# Patient Record
Sex: FEMALE | Race: BLACK OR AFRICAN AMERICAN | NOT HISPANIC OR LATINO | Employment: OTHER | URBAN - METROPOLITAN AREA
[De-identification: names, ages, dates, MRNs, and addresses within clinical notes are randomized per-mention and may not be internally consistent; named-entity substitution may affect disease eponyms.]

---

## 2017-10-12 PROBLEM — R76.8 HEPATITIS C ANTIBODY TEST POSITIVE: Status: ACTIVE | Noted: 2017-10-12

## 2017-11-13 PROBLEM — K62.5 RECTAL BLEEDING: Status: ACTIVE | Noted: 2017-11-13

## 2017-11-13 PROBLEM — A04.8 H. PYLORI INFECTION: Status: ACTIVE | Noted: 2017-11-13

## 2018-08-03 ENCOUNTER — HOSPITAL ENCOUNTER (OUTPATIENT)
Dept: RADIOLOGY | Facility: HOSPITAL | Age: 63
Discharge: HOME | End: 2018-08-03
Attending: PSYCHIATRY & NEUROLOGY
Payer: COMMERCIAL

## 2018-08-03 ENCOUNTER — OFFICE VISIT (OUTPATIENT)
Dept: NEUROLOGY | Facility: CLINIC | Age: 63
End: 2018-08-03
Attending: INTERNAL MEDICINE
Payer: COMMERCIAL

## 2018-08-03 VITALS
SYSTOLIC BLOOD PRESSURE: 158 MMHG | RESPIRATION RATE: 16 BRPM | HEIGHT: 63 IN | BODY MASS INDEX: 45 KG/M2 | WEIGHT: 254 LBS | HEART RATE: 86 BPM | DIASTOLIC BLOOD PRESSURE: 90 MMHG

## 2018-08-03 DIAGNOSIS — M43.16 SPONDYLOLISTHESIS OF LUMBAR REGION: ICD-10-CM

## 2018-08-03 DIAGNOSIS — G62.9 POLYNEUROPATHY: ICD-10-CM

## 2018-08-03 DIAGNOSIS — M54.16 LUMBAR RADICULAR PAIN: Primary | ICD-10-CM

## 2018-08-03 DIAGNOSIS — M25.571 ACUTE RIGHT ANKLE PAIN: ICD-10-CM

## 2018-08-03 DIAGNOSIS — M51.9 LUMBAR DISC DISEASE: ICD-10-CM

## 2018-08-03 PROCEDURE — 3008F BODY MASS INDEX DOCD: CPT | Mod: CPTII,S$GLB,, | Performed by: PSYCHIATRY & NEUROLOGY

## 2018-08-03 PROCEDURE — 73610 X-RAY EXAM OF ANKLE: CPT | Mod: TC,RT

## 2018-08-03 PROCEDURE — 73610 X-RAY EXAM OF ANKLE: CPT | Mod: 26,RT,, | Performed by: RADIOLOGY

## 2018-08-03 PROCEDURE — 99999 PR PBB SHADOW E&M-EST. PATIENT-LVL III: CPT | Mod: PBBFAC,,, | Performed by: PSYCHIATRY & NEUROLOGY

## 2018-08-03 PROCEDURE — 3080F DIAST BP >= 90 MM HG: CPT | Mod: CPTII,S$GLB,, | Performed by: PSYCHIATRY & NEUROLOGY

## 2018-08-03 PROCEDURE — 99204 OFFICE O/P NEW MOD 45 MIN: CPT | Mod: S$GLB,,, | Performed by: PSYCHIATRY & NEUROLOGY

## 2018-08-03 PROCEDURE — 3077F SYST BP >= 140 MM HG: CPT | Mod: CPTII,S$GLB,, | Performed by: PSYCHIATRY & NEUROLOGY

## 2018-08-03 NOTE — PROGRESS NOTES
Consult from Dr Diaz    HPI: Rosa Fermin is a 63 y.o. female with history of pain   She used to see Dr Uribe, and outside neurologist who prescribed her tramadol for back pain.  He also reported she had OA of the knees and gait abnormality    The patient states she started seeing Dr Uribe years ago for back pain for which she started having TODD. The patient was maintained on medication long term.   However, in the last few months, her pain has increased tremendously. She states most of the pain is in the right ankle and the top of the right foot. She describes this pain as stabbing and shooting and sore. She does have lower back pain which is pulling on the right side especially which radiates down the leg via lateral leg to the ankle.   Her PCP did place her on Gabapentin which gave her some relief, but she still has moderate- severe pain. PCP also prescribes her narco and tramadol via pain contract. She also uses mobic without full relief.  She feels she could tolerate PT at this time.   She does have some numbness in the foot, which is episodic.  She is not diabetic  She was sent for EMG/NCS at [facility]  An MRI back was ordered by denied by insurance  She denies any trauma. 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   HENT: Negative for nosebleeds.    Eyes: Negative for double vision.   Respiratory: Negative for hemoptysis.    Cardiovascular: Negative for leg swelling.   Gastrointestinal: Negative for blood in stool.   Genitourinary: Negative for hematuria.   Musculoskeletal: Positive for back pain.   Skin: Negative for rash.   Neurological: Positive for sensory change.   Psychiatric/Behavioral: The patient has insomnia.         Poor sleep due to pain.          I have reviewed all of this patient's past medical and surgical histories as well as  family and social histories and active allergies and medications as documented in the electronic medical record.        Exam:  Gen Appearance, well developed/nourished in no apparent distress  CV: 2+ distal pulses with no edema except trace in the legs (she states chronically)  Neuro:  MS: Awake, alert, oriented to place, person, time, situation. Sustains attention. Recent/remote memory intact, Language is full to spontaneous speech/repetition/naming/comprehension. Fund of Knowledge is full  CN: Optic discs are flat with normal vasculature, PERRL, Extraoccular movements and visual fields are full. Normal facial sensation and strength, Hearing symmetric, Tongue and Palate are midline and strong. Shoulder Shrug symmetric and strong.  Motor: Normal bulk, tone, no abnormal movements. 5/5 strength bilateral upper/lower extremities with 1+ reflexes and no clonus  Sensory: symmetric to light touch, pain, temp, and vibration- but decreased to pin in the legs Romberg negative  Cerebellar: Finger-nose,Heal-shin, Rapid alternating movements intact  Gait: Normal stance, no ataxia but she uses cane for walking chronically due to knee arthritis (feet are chronically everted    Imagin2018 EMG: Sensory and motor axonal polyneuropathy  Labs: 2018 CMP, CBC, A1C, B12 reviewed  HIV negative    Xray L spine 2018: Grade 2-3 spondylolisthesis L5 on S1 with suspected spondylolysis.  Superimposed severe degenerative change L5-S1 disc.    Glucose may have been 120 at one lab?      Assessment/Plan: Rosa Fermin is a 63 y.o. female with years of lower back pain and radicular leg pain now with severe right ankle pain and worse right lateral leg radicular pain (L5 pattern). 2018 EMG shows sensory and motor axonal polyneuropathy and Xray of the L spine showed Grade 2-3 spondylolisthesis L5 on S1 with suspected spondylolysis.  Superimposed severe degenerative change L5-S1 disc  I recommend:   1. MRI L spine is medically necessary to  further access lumbar spinal stability given her severe listhesis  and severe disc changes. This is necessary prior to trying any PT or other procedures  2. Will check xray of the right ankle as it is peculiar that EMG did not demonstrate radiculopathy given her symptoms and Lumbar  xray findings.   3. SPEP/NOEL. Otherwise, she may have had a mild impairment if fasting blood glucose as a cause of DM prior. A1C is normal. Discussed diet as measure to avoid DM2/worsening neuropathy  4. She is getting some relief with gabapentin plus narcotics managed by PCP otherwise   RTC in 6 weeks  Patient was asked to call for MRI results and further instructions    Dr Diaz

## 2018-08-17 ENCOUNTER — HOSPITAL ENCOUNTER (OUTPATIENT)
Dept: RADIOLOGY | Facility: HOSPITAL | Age: 63
Discharge: HOME | End: 2018-08-17
Attending: PSYCHIATRY & NEUROLOGY
Payer: COMMERCIAL

## 2018-08-17 DIAGNOSIS — M54.50 ACUTE LOW BACK PAIN DUE TO SPINAL DISORDER: Primary | ICD-10-CM

## 2018-08-17 DIAGNOSIS — M53.9 ACUTE LOW BACK PAIN DUE TO SPINAL DISORDER: Primary | ICD-10-CM

## 2018-08-17 PROCEDURE — 72148 MRI LUMBAR SPINE W/O DYE: CPT | Mod: 26,,, | Performed by: RADIOLOGY

## 2018-08-17 PROCEDURE — 72148 MRI LUMBAR SPINE W/O DYE: CPT | Mod: TC

## 2018-08-22 ENCOUNTER — TELEPHONE (OUTPATIENT)
Dept: NEUROSURGERY | Facility: CLINIC | Age: 63
End: 2018-08-22

## 2018-08-22 ENCOUNTER — OFFICE VISIT (OUTPATIENT)
Dept: NEUROSURGERY | Facility: CLINIC | Age: 63
End: 2018-08-22
Payer: COMMERCIAL

## 2018-08-22 ENCOUNTER — HOSPITAL ENCOUNTER (OUTPATIENT)
Dept: RADIOLOGY | Facility: HOSPITAL | Age: 63
Discharge: HOME | End: 2018-08-22
Attending: NEUROLOGICAL SURGERY
Payer: COMMERCIAL

## 2018-08-22 VITALS
BODY MASS INDEX: 43.91 KG/M2 | HEART RATE: 118 BPM | WEIGHT: 247.88 LBS | SYSTOLIC BLOOD PRESSURE: 170 MMHG | DIASTOLIC BLOOD PRESSURE: 76 MMHG | TEMPERATURE: 99 F

## 2018-08-22 DIAGNOSIS — M43.17 SPONDYLOLISTHESIS OF LUMBOSACRAL REGION: ICD-10-CM

## 2018-08-22 DIAGNOSIS — R29.898 LEFT LEG WEAKNESS: Primary | ICD-10-CM

## 2018-08-22 DIAGNOSIS — M43.17 SPONDYLOLISTHESIS, LUMBOSACRAL REGION: ICD-10-CM

## 2018-08-22 DIAGNOSIS — M43.16 SPONDYLOLISTHESIS OF LUMBAR REGION: ICD-10-CM

## 2018-08-22 DIAGNOSIS — M43.17 SPONDYLOLISTHESIS OF LUMBOSACRAL REGION: Primary | ICD-10-CM

## 2018-08-22 DIAGNOSIS — Z98.1 HISTORY OF LUMBAR FUSION: Primary | ICD-10-CM

## 2018-08-22 DIAGNOSIS — R29.898 LEFT LEG WEAKNESS: ICD-10-CM

## 2018-08-22 PROCEDURE — 3078F DIAST BP <80 MM HG: CPT | Mod: CPTII,S$GLB,, | Performed by: NEUROLOGICAL SURGERY

## 2018-08-22 PROCEDURE — 3077F SYST BP >= 140 MM HG: CPT | Mod: CPTII,S$GLB,, | Performed by: NEUROLOGICAL SURGERY

## 2018-08-22 PROCEDURE — 72120 X-RAY BEND ONLY L-S SPINE: CPT | Mod: 26,,, | Performed by: RADIOLOGY

## 2018-08-22 PROCEDURE — 99204 OFFICE O/P NEW MOD 45 MIN: CPT | Mod: S$GLB,,, | Performed by: NEUROLOGICAL SURGERY

## 2018-08-22 PROCEDURE — 99999 PR PBB SHADOW E&M-EST. PATIENT-LVL IV: CPT | Mod: PBBFAC,,, | Performed by: NEUROLOGICAL SURGERY

## 2018-08-22 PROCEDURE — 72120 X-RAY BEND ONLY L-S SPINE: CPT | Mod: TC

## 2018-08-22 PROCEDURE — 3008F BODY MASS INDEX DOCD: CPT | Mod: CPTII,S$GLB,, | Performed by: NEUROLOGICAL SURGERY

## 2018-08-22 NOTE — H&P (VIEW-ONLY)
Subjective:   I, Juan Cheek, attest that this documentation has been prepared under the direction and in the presence of Noel Bernal MD.     Patient ID: Rosa Fermin is a 63 y.o. female     Chief Complaint: Consult      HPI  The patient is a 63 y.o. female who was referred to me by Dr. Tapia. The pt complains of acute on chronic low back pain.  She has a long hx of back pain and was followed by an outside facility for her back pain for many years which was well-maintained with medication and TODD's. However, her back pain has been progressing since December but has significantly worsened within the last two months. She notes pain in her bilateral lower pain with intermittent radiation into the RLE. She states the pain has limited her physical activities and is exacerbated when sitting or laying supine. She has not been able to sleep on her bed because the pain is intolerable and is now sleeping on her couch. She rates the pain a 8-9/10 without pain medication and 3/10 with medication. She endorses BLE weakness which is worse on the left, secondary to back pain and chronic knee pain. She has a hx of left knee surgery. She works as a  in the setting of outpatient clinics and hospitals. The pt reports no hx of cigarette use. Pt has a hx of HTN but no cardiac hx.    Review of Systems   Constitutional: Negative for activity change, fatigue, fever and unexpected weight change.   HENT: Negative for facial swelling.    Eyes: Negative.    Respiratory: Negative.    Cardiovascular: Negative.    Gastrointestinal: Negative for diarrhea, nausea and vomiting.   Genitourinary: Negative.    Musculoskeletal: Positive for back pain (lumbar). Negative for joint swelling, myalgias and neck pain.        Positive for RLE pain.   Neurological: Positive for weakness (BLE; left greater than right). Negative for dizziness, numbness and headaches.   Psychiatric/Behavioral: Negative.       Past Medical History:    Diagnosis Date    Atypical chest pain     Bilateral chronic knee pain     Carpal tunnel syndrome on both sides     Chronic lower back pain     History of DVT (deep vein thrombosis)     left leg in the 90s    Hypertension     Osteoarthritis of both knees        Objective:      Vitals:    08/22/18 1104   BP: (!) 170/76   Pulse: (!) 118   Temp: 98.7 °F (37.1 °C)      Physical Exam   Constitutional: She is oriented to person, place, and time. She appears well-developed and well-nourished.   HENT:   Head: Normocephalic and atraumatic.   Neck: Neck supple.   Neurological: She is alert and oriented to person, place, and time. No cranial nerve deficit or sensory deficit. She displays a negative Romberg sign. GCS eye subscore is 4. GCS verbal subscore is 5. GCS motor subscore is 6.   Pt exhibits decreased strength in her bilateral lower extremities, left greater than right.          IMAGING:  MRI Lumbar spine WO Contrast (08/17/2018) shows spondylolisthesis with associated irritation of the nerve root and lumbar canal stenosis at L5/S1. There is a broad-based disc herniation at the level above this at L4/L5.    I have personally reviewed the images with the pt.      I, Dr. Noel Bernal, personally performed the services described in this documentation. All medical record entries made by the scribe, Juan Cheek, were at my direction and in my presence.  I have reviewed the chart and agree that the record reflects my personal performance and is accurate and complete. Noel Bernal MD. 08/22/2018    Assessment:       1. Left leg weakness    2. Spondylolisthesis, lumbosacral region         Plan:   I have personally reviewed the MRI of lumbar spine with the pt which shows spondylolisthesis with associated irritation of the nerve root and lumbar canal stenosis at L5/S1. There is a broad-based disc herniation at the level above this at L4/L5.    I recommend the patient a two-level Transforaminal lumbar interbody fusion  (TLIF) at L4/5 and L5/S1. I have discussed the risks/benefits, indications, and alternatives for the proposed procedure in detail.  I have answered all of her questions and the pt wishes to proceed with surgery.  We will schedule the pt on Thursday, September 13, 2018    I will schedule the pt for an X-ray of the lumbar spine with flexion and extension today.     She will also require a CT of lumbar spine prior to the procedure.    I recommend the patient to follow up with Pre-op for further medical evaluation. I will refer the patient.     I have informed her that she would need to be out of work for approximately 6 weeks.

## 2018-08-22 NOTE — PATIENT INSTRUCTIONS
I have personally reviewed the MRI of lumbar spine with the pt shows spondylolisthesis with associated irritation of the nerve root and lumbar canal stenosis at L5/S1. There is a broad-based disc herniation at the level above this at L4/L5.    I recommend the patient a two-level Transforaminal lumbar interbody fusion (TLIF) at L4/5 and L5/S1. I have discussed the risks/benefits, indications, and alternatives for the proposed procedure in detail.  I have answered all of her questions and the pt wishes to proceed with surgery.  We will schedule the pt on Thursday, September 13, 2018    I will schedule the pt for an X-ray of the lumbar spine with flexion and extension today.     She will also require a CT of lumbar spine prior to the procedure.    I recommend the patient to follow up with Pre-op for further medical evaluation. I will refer the patient.     I have informed her that she would need to be out of work for approximately 6 weeks.

## 2018-08-24 ENCOUNTER — ANESTHESIA EVENT (OUTPATIENT)
Dept: SURGERY | Facility: HOSPITAL | Age: 63
End: 2018-08-24
Payer: COMMERCIAL

## 2018-08-24 ENCOUNTER — TELEPHONE (OUTPATIENT)
Dept: PREADMISSION TESTING | Facility: HOSPITAL | Age: 63
End: 2018-08-24

## 2018-08-24 DIAGNOSIS — Z01.818 PREOPERATIVE TESTING: Primary | ICD-10-CM

## 2018-08-24 NOTE — ANESTHESIA PREPROCEDURE EVALUATION
Anesthesia Assessment: Preoperative EQUATION     Planned Procedure: Procedure(s) (LRB):  FUSION, SPINE, LUMBAR, TLIF, MINIMALLY INVASIVE @ L4/L5 & L5/S1 (N/A)  Requested Anesthesia Type:General  Surgeon: Noel Bernal MD  Service: Neurosurgery  Known or anticipated Date of Surgery:9/13/2018     Surgeon notes: reviewed     Electronic QUestionnaire Assessment completed via nurse interview with patient.      NO AQ     Triage considerations:         Previous anesthesia records:MAC and No problems  12/11/2017 COLONOSCOPY  Airway/Jaw/Neck:  Airway Findings: Mouth Opening: Normal Tongue: Normal  General Airway Assessment: Adult  Mallampati: II  TM Distance: Normal, at least 6 cm  Jaw/Neck Findings:  Neck ROM: Normal ROM            Last PCP note: within 3 months , within [facility]   Subspecialty notes: Gastroenterology, Neurology, NEUROSURGERY     Other important co-morbidities: PER Epic: HTN, MORBID OBESE, H/O DVT     Tests already available:  Available tests,  within 3 months , within [facility] .7/20/2018 CMP,CBC,7/19/2018 UA, MICRO UA,7/13/2018 HGA1C, 4/11/2018 TSH                            Instructions given. (See in Nurse's note)     Optimization:  Anesthesia Preop Clinic Assessment  Indicated    Medical Opinion Indicated                                        Plan:    Testing:  PT/INR, PTT and EKG   Pre-anesthesia  visit                                        Visit focus: concerns in complex and/or prolonged anesthesia                           Consultation:IM Perioperative Hospitalist                           Patient  has previously scheduled Medical Appointment:9/4 CT NON CONTRAST     Navigation: Tests Scheduled.TBD                         Consults scheduled.TBD                        Results will be tracked by Preop Clinic.                                     Electronically signed by Meghana Omalley RN at 8/24/2018  9:59 AM       Pre-admit on 9/13/2018            Detailed Report    9/10 Labs and EKG resulted and  noted.  Meghana Omalley RN BSN  Preop Center                                                                                                                     08/24/2018  Rosa Fermin is a 63 y.o., female.    Anesthesia Evaluation      I have reviewed the Medications.   Steroids Taken In Past Year:     Review of Systems  Anesthesia Hx:  No problems with previous Anesthesia History of prior surgery of interest to airway management or planning: Previous anesthesia: General, Nerve Block colonoscopy 12/2017 with general anesthesia.  Procedure performed at an [facility] Facility. for L TKA 2015.  Procedure performed at an [facility] Facility. Denies Family Hx of Anesthesia complications.    Social:  Non-Smoker, No Alcohol Use    Hematology/Oncology:  Hematology Normal   Oncology Normal     EENT/Dental:   Reading glasses   Cardiovascular:   Hypertension H/o DVT 1990's Functional Capacity good / => 4 METS, currently walking with walker; works as SW; climb 1 FOS; reports SOB on exertion which is not new; denies CP    Pulmonary:   Denies Asthma.  Denies Shortness of breath.  Denies Sleep Apnea.    Hepatic/GI:   GERD (takes Zantac), well controlled    Musculoskeletal:   Arthritis (OA s/p L TKA 2015)  Spondylolisthesis; bulging disc    States had steroid injection in hip approx 2.5 months ago Spine Disorders: lumbar Chronic Pain    Neurological:   Denies CVA. Neuromuscular Disease,  Denies Seizures. BLE weakness Pain , onset is chronic , location of back , quality of aching/dull, throbbing, sharp , severity is a 7    Endocrine:   Denies Diabetes.        Physical Exam  General:  Obesity    Airway/Jaw/Neck:  Airway Findings: Mouth Opening: Normal Tongue: Normal  General Airway Assessment: Adult  Jaw/Neck Findings:  Neck ROM: Extension Decreased, Mild  Neck Findings:  Short Neck      Dental:  Dental Findings: In tact         Mental Status:  Mental Status Findings:  Cooperative, Alert and Oriented       Pt was seen in POC  9/5/18; Medical optimization: please see EPIC notes for recommendations of pre-op medical consultant, Dr Thorpe, for perioperative medical management./Josefina Barajas RN          Anesthesia Plan  Type of Anesthesia, risks & benefits discussed:  Anesthesia Type:  general  Patient's Preference: Proceed with anesthesia understanding that the risks are very small but could be serious or life threatening.  Intra-op Monitoring Plan: standard ASA monitors  Intra-op Monitoring Plan Comments:   Post Op Pain Control Plan:   Post Op Pain Control Plan Comments:   Induction:   IV  Beta Blocker:  Patient is not currently on a Beta-Blocker (No further documentation required).       Informed Consent: Patient understands risks and agrees with Anesthesia plan.  Questions answered. Anesthesia consent signed with patient.  ASA Score: 3     Day of Surgery Review of History & Physical: I have interviewed and examined the patient. I have reviewed the patient's H&P dated:    H&P update referred to the surgeon.         Ready For Surgery From Anesthesia Perspective.

## 2018-08-24 NOTE — PRE-PROCEDURE INSTRUCTIONS
Preop instructions given. Hold asa, asa containing products, nsaids, vitamins and supplements one week prior to surgery. Will need optimization by Dr. Thorpe, poc appt, labs and ekg prior to surgery.  Our  will call to set up these appts.Verbalizes understanding.

## 2018-09-05 ENCOUNTER — INITIAL CONSULT (OUTPATIENT)
Dept: INTERNAL MEDICINE | Facility: CLINIC | Age: 63
End: 2018-09-05
Payer: COMMERCIAL

## 2018-09-05 ENCOUNTER — HOSPITAL ENCOUNTER (OUTPATIENT)
Dept: CARDIOLOGY | Facility: CLINIC | Age: 63
Discharge: HOME | End: 2018-09-05
Payer: COMMERCIAL

## 2018-09-05 ENCOUNTER — HOSPITAL ENCOUNTER (OUTPATIENT)
Dept: PREADMISSION TESTING | Facility: HOSPITAL | Age: 63
Discharge: HOME | End: 2018-09-05
Attending: ANESTHESIOLOGY
Payer: COMMERCIAL

## 2018-09-05 VITALS
HEIGHT: 63 IN | SYSTOLIC BLOOD PRESSURE: 130 MMHG | OXYGEN SATURATION: 100 % | TEMPERATURE: 98 F | HEART RATE: 95 BPM | DIASTOLIC BLOOD PRESSURE: 74 MMHG | WEIGHT: 242.69 LBS | BODY MASS INDEX: 43 KG/M2

## 2018-09-05 DIAGNOSIS — K21.9 GASTROESOPHAGEAL REFLUX DISEASE, ESOPHAGITIS PRESENCE NOT SPECIFIED: ICD-10-CM

## 2018-09-05 DIAGNOSIS — Z01.818 PREOPERATIVE TESTING: ICD-10-CM

## 2018-09-05 DIAGNOSIS — R76.8 HEPATITIS C ANTIBODY TEST POSITIVE: ICD-10-CM

## 2018-09-05 DIAGNOSIS — F11.90 CHRONIC, CONTINUOUS USE OF OPIOIDS: Chronic | ICD-10-CM

## 2018-09-05 DIAGNOSIS — R60.9 EDEMA, UNSPECIFIED TYPE: ICD-10-CM

## 2018-09-05 DIAGNOSIS — I10 ESSENTIAL HYPERTENSION: ICD-10-CM

## 2018-09-05 DIAGNOSIS — Z01.818 PREOP EXAMINATION: Primary | ICD-10-CM

## 2018-09-05 DIAGNOSIS — Z98.890 HISTORY OF CARDIAC CATH: ICD-10-CM

## 2018-09-05 DIAGNOSIS — Z86.718 HISTORY OF DVT (DEEP VEIN THROMBOSIS): ICD-10-CM

## 2018-09-05 PROBLEM — K62.5 RECTAL BLEEDING: Status: RESOLVED | Noted: 2017-11-13 | Resolved: 2018-09-05

## 2018-09-05 PROCEDURE — 99244 OFF/OP CNSLTJ NEW/EST MOD 40: CPT | Mod: S$GLB,,, | Performed by: HOSPITALIST

## 2018-09-05 PROCEDURE — 99999 PR PBB SHADOW E&M-EST. PATIENT-LVL IV: CPT | Mod: PBBFAC,,, | Performed by: HOSPITALIST

## 2018-09-05 PROCEDURE — 93000 ELECTROCARDIOGRAM COMPLETE: CPT | Mod: S$GLB,,, | Performed by: INTERNAL MEDICINE

## 2018-09-05 RX ORDER — DEXTROMETHORPHAN HYDROBROMIDE, GUAIFENESIN 5; 100 MG/5ML; MG/5ML
650 LIQUID ORAL
Status: ON HOLD | COMMUNITY
End: 2018-09-20 | Stop reason: HOSPADM

## 2018-09-05 NOTE — ASSESSMENT & PLAN NOTE
Edema- I suggested avoidance of added salt,avoidance of NSAID's and suggested Limb elevation and alena hose use

## 2018-09-05 NOTE — PATIENT INSTRUCTIONS
PreOp Instructions given:    -- Medication information (what to hold and what to take)   -- NPO guidelines as follows: (or as per your Surgeon)  1. Stop ALL solid food, gum, candy (including vitamins) 8 hours before surgery/procedure time.  2. Stop all CLOUDY liquids: coffee with creamer, cloudy juices, 6 hours prior to surgery/procedure time.  3. The patient should be ENCOURAGED to drink carbohydrate-rich clear liquids (sports drinks, clear juices) until 2 hours prior to surgery/procedure time.  4. CLEAR liquids include only water, black coffee NO creamer, clear oral rehydration drinks, clear sports drinks or clear fruit juices (no orange juice, no pulpy juices, no apple cider).   5. IF IN DOUBT, drink water instead.   6. NOTHING TO DRINK 2 hours before to surgery/procedure time. If you are told to take medication on the morning of surgery, it may be taken with a sip of water.   -- Arrival place and directions given; time to be given the day before procedure by the Surgeon's Office   -- Bathing with antibacterial soap   -- Don't wear any jewelry or bring any valuables AM of surgery   -- No makeup or moisturizer to face   -- No perfume/cologne, powder, lotions or aftershave     Pt verbalized understanding.

## 2018-09-05 NOTE — ASSESSMENT & PLAN NOTE
GERD-  I suggest continuation of the Zantac  in the perioperative period . I suggest aspiration precautions

## 2018-09-05 NOTE — LETTER
September 7, 2018      Noel Bernal MD  [address]  [place] [address]           [facility] - Pre Op Consult  [address] [facility] [address]  Phone: [phone]          Patient: Rosa Fermin   MR Number: [id number]   YOB: 1955   Date of Visit: 9/5/2018       Dear Dr. Noel Bernal:    Thank you for referring Rosa Fermin to me for evaluation. Attached you will find relevant portions of my assessment and plan of care.    If you have questions, please do not hesitate to call me. I look forward to following Rosa Fermin along with you.    Sincerely,    Emmy Thorpe MD    Enclosure  CC:  No Recipients    If you would like to receive this communication electronically, please contact [email or web address] or [phone] to request more information on [employer] Link access.    For providers and/or their staff who would like to refer a patient to [facility], please contact us through our one-stop-shop provider referral line, [facility], at [phone].    If you feel you have received this communication in error or would no longer like to receive these types of communications, please e-mail [email or web address]

## 2018-09-05 NOTE — ASSESSMENT & PLAN NOTE
In 1997   History of DVT- Left leg  No PE  1 Episode  In the setting of left ankle sprain from a mechanical fall   Associated with reduced mobility,no  long journeys,no  cancer,no prior surgery, no Hospital stay,No  HRT  Anticoagulated with Coumadin for 12- 18 months  IVC filter - none    Increased risk of thrombosis in the randi operative period , compression stocking use discussed

## 2018-09-05 NOTE — DISCHARGE INSTRUCTIONS
Your surgery has been scheduled for:__________________________________________    You should report to:  ____[facility], located on the [place] side of the first floor of the           [facility] ([phone])  ____[facility], located on the [facility] side of the            Second floor of the [facility] [phone])  ____3rd Floor [facility] located on the [facility] side of the [facility] [phone]  Please Note   - Tell your doctor if you take Aspirin, products containing Aspirin, herbal medications  or blood thinners, such as Coumadin, Ticlid, or Plavix.  (Consult your provider regarding holding or stopping before surgery).  - Arrange for someone to drive you home following surgery.  You will not be allowed to leave the surgical facility alone or drive yourself home following sedation and anesthesia.  Before Surgery  - Stop taking all herbal medications 14days prior to surgery  - No Motrin/Advil (Ibuprofen) 7 days before surgery  - No Aleve (Naproxen) 7 days before surgery  - Stop Taking Asprin, products containing Asprin _____days before surgery  - Stop taking blood thinners_______days before surgery  - No Goody's/BC  Powder 7 days before surgery  - Refrain from drinking alcoholic beverages for 24hours before and after surgery  - Stop or limit smoking _________days before surgery  - You may take Tylenol for pain  Night before Surgery  NOTHING TO EAT OR DRINK AFTER MIDNIGHT OR FOLLOW SURGEON'S INSTRUCTIONS  - Take a shower or bath (shower is recommended).  Bathe with Hibiclens soap or an antibacterial soap from the neck down.  If not supplied by your surgeon, hibiclens soap will need to be purchased over the counter in pharmacy.  Rinse soap off thoroughly.  - Shampoo your hair with your regular shampoo  The Day of Surgery  · If you are told to take medication on the morning of surgery, it may be taken with a  sip of water.   - Take another bath or shower with hibiclens or any antibacterial soap, to reduce the chance of infection.  - Take heart and blood pressure medications with a small sip of water, as advised by the perioperative team.  - Do not take fluid pills  - You may brush your teeth and rinse your mouth, but do not swall any additional water.   - Do not apply perfumes, powder, body lotions or deodorant on the day of surgery.  - Nail polish should be removed.  - Do not wear makeup or moisturizer  - Wear comfortable clothes, such as a button front shirt and loose fitting pants.  - Leave all jewelry, including body piercings, and valuables at home.    - Bring any devices you will neeed after surgery such as crutches or canes.  - If you have sleep apnea, please bring your CPAP machine  In the event that your physical condition changes including the onset of a cold or respiratory illness, or if you have to delay or cancel your surgery, please notify your surgeon.  Anesthesia: General Anesthesia  Youre due to have surgery. During surgery, youll be given medication called anesthesia. (It is also called anesthetic.) This will keep you comfortable and pain-free. Your anesthesia provider will use general anesthesia. This sheet tells you more about it.  What is general anesthesia?     You are watched continuously during your procedure by the anesthesia provider   General anesthesia puts you into a state like deep sleep. It goes into the bloodstream (IV anesthetics), into the lungs (gas anesthetics), or both. You feel nothing during the procedure. You will not remember it. During the procedure, the anesthesia provider monitors you continuously. He or she checks your heart rate and rhythm, blood pressure, breathing, and blood oxygen.  · IV Anesthetics. IV anesthetics are given through an IV line in your arm. Theyre often given first. This is so you are asleep before a gas anesthetic is started. Some kinds of IV  anesthetics relieve pain. Others relax you. Your doctor will decide which kind is best in your case.  · Gas Anesthetics. Gas anesthetics are breathed into the lungs. They are often used to keep you asleep. They can be given through a facemask or a tube placed in your larynx or trachea (breathing tube).  ? If you have a facemask, your anesthesia provider will most likely place it over your nose and mouth while youre still awake. Youll breathe oxygen through the mask as your IV anesthetic is started. Gas anesthetic may be added through the mask.  ? If you have a tube in the larynx or trachea, it will be inserted into your throat after youre asleep.  Anesthesia tools and medications  You will likely have:  · IV anesthetics. These are put into an IV line into your bloodstream.  · Gas anesthetics. You breathe these anesthetics into your lungs, where they pass into your bloodstream.  · Pulse oximeter. This is a small clip that is attached to the end of your finger. This measures your blood oxygen level.  · Electrocardiography leads (electrodes). These are small sticky pads that are placed on your chest. They record your heart rate and rhythm.  · Blood pressure cuff. This reads your blood pressure.  Risks and possible complications  General anesthesia has some risks. These include:  · Breathing problems  · Nausea and vomiting  · Sore throat or hoarseness (usually temporary)  · Allergic reaction to the anesthetic  · Irregular heartbeat (rare)  · Cardiac arrest (rare)   Anesthesia safety  · Follow all instructions you are given for how long not to eat or drink before your procedure.  · Be sure your doctor knows what medications and drugs you take. This includes over-the-counter medications, herbs, supplements, alcohol or other drugs. You will be asked when those were last taken.  · Have an adult family member or friend drive you home after the procedure.  · For the first 24 hours after your surgery:  ? Do not drive or use  heavy equipment.  ? Have a trusted family member or spouse make important decisions or sign documents.  ? Avoid alcohol.  ? Have a responsible adult stay with you. He or she can watch for problems and help keep you safe.  Date Last Reviewed: 10/16/2014  © 9908-7794 [employer]. [address] [place] [address]. All rights reserved. This information is not intended as a substitute for professional medical care. Always follow your healthcare professional's instructions.

## 2018-09-05 NOTE — ASSESSMENT & PLAN NOTE
Losartan ( talking Nightly )  usual BP  At work 140/80's  Works as a  in a doctor's office   Hypertension-  Blood pressure is acceptable . I suggest continuation of Losartan ( talking Nightly )  during the entire perioperative period. I suggest  blood pressure, electrolyte and renal function monitoring .I suggest addressing pain control as uncontrolled pain can increased blood pressure

## 2018-09-05 NOTE — OUTPATIENT SUBJECTIVE & OBJECTIVE
Outpatient Subjective & Objective     Chief complaint-Preoperative evaluation, Perioperative Medical management, complication reduction plan     Active cardiac conditions- none    Revised cardiac risk index predictors- none    Functional capacity -Examples of physical activity, works as ,  can take a flight of stairs holding on to the railing----- She can undertake all the above activities without  chest pain,chest tightness, ,dizziness,lightheadedness making her exercise tolerance more, less  than 4 Mets.   Winded on exertion, not new ,stable over time -attributes to weight and deconditioning     Review of Systems   Constitutional: Negative for chills and fever.            Weight loss 10  pounds over 4-6 weeks- suggested follow        HENT:        STOPBANG score  4/ 8      HTN  BMI> 35   Age over 50   Neck size over 40 CM     Eyes:        No new visual changes   Respiratory:        No cough , phlegm    No Hemoptysis   Cardiovascular:        As noted   Gastrointestinal:        No overt GI/ blood losses  Bowel movements- Regular    Endocrine:        Prednisone use > 20 mg daily for 3 weeks- None   Genitourinary: Negative for dysuria.        No urinary hesitancy    Musculoskeletal:        As above   Rt knee pain   Neurological: Negative for syncope.        No unilateral weakness   Hematological:        Current use of Anticoagulants  Current use of Antiplatelet agents  None   Psychiatric/Behavioral:        No Depression,Anxiety         No vascular stenting     Past Surgical History:   Procedure Laterality Date    COLONOSCOPY  2007    Dr Bolton    ENDOMETRIAL ABLATION N/A 2003    SHOULDER ARTHROSCOPY W/ ROTATOR CUFF REPAIR Bilateral 2012, 2013    TOTAL KNEE ARTHROPLASTY Left        No anesthesia, bleeding, cardiac problems, PONV with previous surgeries/procedures.  Medications and Allergies reviewed in epic.   FH- No anesthesia,bleeding / venous thrombosis , early onset heart disease in family  "  Lives with family  , who can help     Physical Exam   HENT:   Head: Normocephalic.     Physical Exam   HENT:   Head: Normocephalic.     Constitutional- Vitals - Body mass index is 42.99 kg/m²., There were no vitals filed for this visit.  General appearance-Conscious,Coherent  Eyes- No conjunctival icterus,pupils  round  and reactive to light   ENT-Oral cavity- moist  , Hearing grossly normal   Neck- No thyromegaly ,Trachea -central, No jugular venous distension,   No Carotid Bruit   Cardiovascular -Heart Sounds- Normal  and  no murmur   , No gallop rhythm   Respiratory - Normal Respiratory Effort, Normal breath sounds,  no wheeze  and  no forced expiratory wheeze    Peripheral pitting pedal edema--lEFT Sided venous changes  and  mild, no calf pain   Gastrointestinal -Soft abdomen, No palpable masses, Non Tender,Liver,Spleen not palpable. No-- free fluid and shifting dullness  Musculoskeletal- No finger Clubbing. Strength grossly normal   Lymphatic-No Palpable cervical, axillary,Inguinal lymphadenopathy   Psychiatric - normal effect,Orientation  Rt Dorsalis pedis pulses-palpable    Lt Dorsalis pedis pulses- palpable   Rt Posterior tibial pulses -palpable   Left posterior tibial pulses -palpable   Miscellaneous -  no asterixis and  no dupuytren's contracture  Height 5' 3" (1.6 m), weight 110.1 kg (242 lb 11.2 oz).      Investigations  Lab and Imaging have been reviewed in epic.    Review of Medicine tests    EKG- I had independently reviewed the EKG from--7/2/2015   It was reported to be showing     Normal sinus rhythm  Within normal limits  No previous ECGs available    Feb 2017 - Left-Negative study for deep venous thrombosis     Aug 2015     ABNORMAL MYOCARDIAL PERFUSION  1. There is evidence for a moderate sized area of moderate myocardial ischemia that extends from the base to the mid anterior wall of the left ventricle and a moderate sized area of moderate myocardial ischemia that extends from the base to the " mid   inferior wall of the left ventricle    Review of clinical lab tests:  Lab Results   Component Value Date    CREATININE 0.8 07/20/2018    HGB 12.5 07/20/2018     07/20/2018         Review of old records- Was done and information gathered regards to events leading to surgery and health conditions of significance in the perioperative period.    Outpatient Subjective & Objective

## 2018-09-05 NOTE — ASSESSMENT & PLAN NOTE
Chronic continuous opioid use- In view of the opioid use, the patient may have opioid tolerance .I suggest considering the possibility of opioid tolerance  in planning post operative pain control

## 2018-09-05 NOTE — HPI
History of present illness- I had the pleasure of meeting this pleasant 63 y.o. lady in the pre op clinic prior to her elective spine surgery. The patient is new to me . Rosa was accompanied by daughter Kelley, friend dereje.    I have obtained the history by speaking to the patient and by reviewing the electronic health records.    Events leading up to surgery / History of presenting illness -    Spondylolisthesis of lumbosacral region    She has been troubled with moderate-severe   Low back pain down to both ankles  for 6 months, increased over time   . Pain increases at night time  and decreases with activity  and pain medication.      Relevant health conditions of significance for the perioperative period/ History of presenting illness -    Patient Active Problem List    Diagnosis Date Noted    History of DVT (deep vein thrombosis) 09/05/2018    BMI 40.0-44.9, adult 04/17/2018              Hepatitis C antibody test positive 10/12/2017         Chronic, continuous use of opioids 05/03/2016                                            HTN (hypertension) 08/21/2014          Not known to have heart disease , Diabetes Mellitus, Lung disease

## 2018-09-05 NOTE — PROGRESS NOTES
[facility] - Pre Op Consult  Progress Note    Patient Name: Rosa Fermin  MRN: [id number]  Date of Evaluation- 09/05/2018  PCP- Chery Lopez MD    Future cases for Rosa Fermin [[id number]]     Case ID Status Date Time Joey Procedure Provider Location    [id number] [facility] 9/13/2018 10:00  FUSION, SPINE, LUMBAR, TLIF, MINIMALLY INVASIVE @ L4/L5 & L5/S1 Noel Bernal MD [[id number]] [facility] OR 2ND FLR          HPI:  History of present illness- I had the pleasure of meeting this pleasant 63 y.o. lady in the pre op clinic prior to her elective spine surgery. The patient is new to me . Rosa was accompanied by daughter Kelley, friend dereje.    I have obtained the history by speaking to the patient and by reviewing the electronic health records.    Events leading up to surgery / History of presenting illness -    Spondylolisthesis of lumbosacral region    She has been troubled with moderate-severe   Low back pain down to both ankles  for 6 months, increased over time   . Pain increases at night time  and decreases with activity  and pain medication.      Relevant health conditions of significance for the perioperative period/ History of presenting illness -    Patient Active Problem List    Diagnosis Date Noted    History of DVT (deep vein thrombosis) 09/05/2018    BMI 40.0-44.9, adult 04/17/2018              Hepatitis C antibody test positive 10/12/2017         Chronic, continuous use of opioids 05/03/2016                                            HTN (hypertension) 08/21/2014          Not known to have heart disease , Diabetes Mellitus, Lung disease       Subjective/ Objective:          Chief complaint-Preoperative evaluation, Perioperative Medical management, complication reduction plan     Active cardiac conditions- none    Revised cardiac risk index predictors- none    Functional capacity -Examples of physical activity, works as ,  can take a flight of stairs holding on to the  railing----- She can undertake all the above activities without  chest pain,chest tightness, ,dizziness,lightheadedness making her exercise tolerance more, less  than 4 Mets.   Winded on exertion, not new ,stable over time -attributes to weight and deconditioning     Review of Systems   Constitutional: Negative for chills and fever.            Weight loss 10  pounds over 4-6 weeks- suggested follow        HENT:        STOPBANG score  4/ 8      HTN  BMI> 35   Age over 50   Neck size over 40 CM     Eyes:        No new visual changes   Respiratory:        No cough , phlegm    No Hemoptysis   Cardiovascular:        As noted   Gastrointestinal:        No overt GI/ blood losses  Bowel movements- Regular    Endocrine:        Prednisone use > 20 mg daily for 3 weeks- None   Genitourinary: Negative for dysuria.        No urinary hesitancy    Musculoskeletal:        As above   Rt knee pain   Neurological: Negative for syncope.        No unilateral weakness   Hematological:        Current use of Anticoagulants  Current use of Antiplatelet agents  None   Psychiatric/Behavioral:        No Depression,Anxiety         No vascular stenting     Past Surgical History:   Procedure Laterality Date    COLONOSCOPY  2007    Dr Bolton    ENDOMETRIAL ABLATION N/A 2003    SHOULDER ARTHROSCOPY W/ ROTATOR CUFF REPAIR Bilateral 2012, 2013    TOTAL KNEE ARTHROPLASTY Left        No anesthesia, bleeding, cardiac problems, PONV with previous surgeries/procedures.  Medications and Allergies reviewed in epic.   FH- No anesthesia,bleeding / venous thrombosis , early onset heart disease in family   Lives with family  , who can help     Physical Exam   HENT:   Head: Normocephalic.     Physical Exam   HENT:   Head: Normocephalic.     Constitutional- Vitals - Body mass index is 42.99 kg/m²., There were no vitals filed for this visit.  General appearance-Conscious,Coherent  Eyes- No conjunctival icterus,pupils  round  and reactive to light  "  ENT-Oral cavity- moist  , Hearing grossly normal   Neck- No thyromegaly ,Trachea -central, No jugular venous distension,   No Carotid Bruit   Cardiovascular -Heart Sounds- Normal  and  no murmur   , No gallop rhythm   Respiratory - Normal Respiratory Effort, Normal breath sounds,  no wheeze  and  no forced expiratory wheeze    Peripheral pitting pedal edema--lEFT Sided venous changes  and  mild, no calf pain   Gastrointestinal -Soft abdomen, No palpable masses, Non Tender,Liver,Spleen not palpable. No-- free fluid and shifting dullness  Musculoskeletal- No finger Clubbing. Strength grossly normal   Lymphatic-No Palpable cervical, axillary,Inguinal lymphadenopathy   Psychiatric - normal effect,Orientation  Rt Dorsalis pedis pulses-palpable    Lt Dorsalis pedis pulses- palpable   Rt Posterior tibial pulses -palpable   Left posterior tibial pulses -palpable   Miscellaneous -  no asterixis and  no dupuytren's contracture  Height 5' 3" (1.6 m), weight 110.1 kg (242 lb 11.2 oz).      Investigations  Lab and Imaging have been reviewed in epic.    Review of Medicine tests    EKG- I had independently reviewed the EKG from--7/2/2015   It was reported to be showing     Normal sinus rhythm  Within normal limits  No previous ECGs available    Feb 2017 - Left-Negative study for deep venous thrombosis     Aug 2015     ABNORMAL MYOCARDIAL PERFUSION  1. There is evidence for a moderate sized area of moderate myocardial ischemia that extends from the base to the mid anterior wall of the left ventricle and a moderate sized area of moderate myocardial ischemia that extends from the base to the mid   inferior wall of the left ventricle    Review of clinical lab tests:  Lab Results   Component Value Date    CREATININE 0.8 07/20/2018    HGB 12.5 07/20/2018     07/20/2018         Review of old records- Was done and information gathered regards to events leading to surgery and health conditions of significance in the perioperative " period.        Preoperative cardiac risk assessment-  The patient does not have any active cardiac conditions . Revised cardiac risk index predictors- 0---.Functional capacity is more than 4 Mets. She will be undergoing a Spine procedure that carries a intermediate risk     The estimated risk of the rate of adverse cardiac outcomes  0.4%    No further cardiac work up is indicated prior to proceeding with the surgery          American Society of Anesthesiologists Physical status classification ( ASA ) class: 3     Postoperative pulmonary complication risk assessment:      ARISCAT ( Canet) risk index- risk class -  Low, if duration of surgery is under 3 hours, intermediate, if duration of surgery is over 3 hours      [facility]- percentage risk of respiratory failure: 1.8 %     Assessment/Plan:     Chronic, continuous use of opioids  Chronic continuous opioid use- In view of the opioid use, the patient may have opioid tolerance .I suggest considering the possibility of opioid tolerance  in planning post operative pain control     HTN (hypertension)  Losartan ( talking Nightly )  usual BP  At work 140/80's  Works as a  in a doctor's office   Hypertension-  Blood pressure is acceptable . I suggest continuation of Losartan ( talking Nightly )  during the entire perioperative period. I suggest  blood pressure, electrolyte and renal function monitoring .I suggest addressing pain control as uncontrolled pain can increased blood pressure     Hepatitis C antibody test positive    No suggestion of  hepatic decompensation     Deep vein thrombosis prophylaxis  In the setting of Joint Replacement as prophylaxis    History of DVT (deep vein thrombosis)- in 1997 - Provoked DVT  In 1997   History of DVT- Left leg  No PE  1 Episode  In the setting of left ankle sprain from a mechanical fall   Associated with reduced mobility,no  long journeys,no  cancer,no prior surgery, no Hospital stay,No   HRT  Anticoagulated with Coumadin for 12- 18 months  IVC filter - none    Increased risk of thrombosis in the randi operative period , compression stocking use discussed    BMI 40.0-44.9, adult  Not known to  have sleep apnea ,Diabetes   Encouraged weight loss    Edema  Edema- I suggested avoidance of added salt,avoidance of NSAID's and suggested Limb elevation and alena hose use    Acid reflux  GERD-  I suggest continuation of the Zantac  in the perioperative period . I suggest aspiration precautions    History of cardiac cath  To her understanding no CAD  Possible false positive distress trest preceding the cath         Preventive perioperative care    Thromboembolic prophylaxis:  Her risk factors for thrombosis include morbid obesity, surgical procedure, previous history of thrombosis and age.I suggest  thromboembolic prophylaxis ( mechanical/pharmacological, weighing the risk benefits of pharmacological agent use considering randi procedural bleeding )  during the perioperative period.I suggested being active in the post operative period.      Postoperative pulmonary complication prophylaxis-Risk factors for post operative pulmonary complications include ASA class >2- I suggest incentive spirometry use, early ambulation and end tidal carbon dioxide monitoring  , oral care , head end of bed elevation     Renal complication prophylaxis-Risk factors for renal complications include hypertension . I suggest keeping her well hydrated.I suggested drinking 2 litre's of water a day      Surgical site Infection Prophylaxis-I  suggest appropriate antibiotic for Prophylaxis against Surgical site infections     Delirium prophylaxis-Risk factors - opioid use - I suggest avoidance / minimizing the use of  Benzodiazepines ( unless the patient has been taking it on a regular basis ),Anticholinergic medication,Antihistamines ( like  Benadryl).I suggest minimizing the use of opioid medication and use of IV tylenol,if it is  "appropriate. I suggest using the lowest possible dose of opioids for the shortest duration possible in the perioperative period. I suggest to Keep shades/blinds open during the day, lights off and shades closed at night to encourage normal sleep/wake cycle.I encourage the presence of the family member with the patient at all times, if at all possible as mental status changes can be picked up early by the family members and they help with reorientation. I encouraged the presence of family to help with orientation in the perioperative period. Benadryl avoidance suggested      In view of Spine procedure the patient  is at risk of postoperative urinary retention.  I suggest avoidance / minimizing the of  Benzodiazepines,Anticholinergic medication,antihistamines ( Benadryl) , if possible in the perioperative period. I suggest using the minimum possible use of opioids for the minimum period of time in the perioperative period. Benadryl avoidance suggested      This visit was focused on Preoperative evaluation, Perioperative Medical management, complication reduction plans. I suggest that the patient follows up with primary care or relevant sub specialists for ongoing health care.    I appreciate the opportunity to be involved in this patients care. Please feel free to contact me if there were any questions about this consultation.    Patient is optimized     Patient was instructed to call and update me about any changes to health,  medication, office visits ,testing out side of the randi operative care center , hospitalizations between now and surgery     Emmy Thorpe MD  Perioperative Medicine  [facility]   Pager [phone]  -----  9/5- 17 51    /74 Comment: rt  Pulse 95   Temp 97.8 °F (36.6 °C) (Oral)   Ht 5' 3" (1.6 m)   Wt 110.1 kg (242 lb 11.2 oz)   SpO2 100%   BMI 42.99 kg/m²     INR,APTT-N  ----  9/11- 7 56     Records Reviewed Aug 2015       -----  9/12- 7 24     Called to follow " up , to address any concerns with the up coming surgery or any questions on Medication instructions   No changes to Medication, Health   Abnormal UA- hematuria- July 2018 - follow up suggested - no gross hematuria  Care with Tylenol from different sources discusesd

## 2018-09-12 ENCOUNTER — TELEPHONE (OUTPATIENT)
Dept: NEUROSURGERY | Facility: CLINIC | Age: 63
End: 2018-09-12

## 2018-09-12 NOTE — TELEPHONE ENCOUNTER
Patient advised to arrive for surgery time at 1230, DOSC, NPO after 6am, shower tonight and tomorrow with antibacterial soap. Understanding verbalized by patient.

## 2018-09-13 ENCOUNTER — HOSPITAL ENCOUNTER (OUTPATIENT)
Facility: HOSPITAL | Age: 63
LOS: 1 days | Discharge: HOME | End: 2018-09-14
Attending: NEUROLOGICAL SURGERY | Admitting: NEUROLOGICAL SURGERY
Payer: COMMERCIAL

## 2018-09-13 ENCOUNTER — ANESTHESIA (OUTPATIENT)
Dept: SURGERY | Facility: HOSPITAL | Age: 63
End: 2018-09-13
Payer: COMMERCIAL

## 2018-09-13 DIAGNOSIS — M47.816 LUMBAR SPONDYLOSIS: ICD-10-CM

## 2018-09-13 DIAGNOSIS — Z01.818 PREOPERATIVE TESTING: ICD-10-CM

## 2018-09-13 LAB
ABO + RH BLD: NORMAL
BLD GP AB SCN CELLS X3 SERPL QL: NORMAL

## 2018-09-13 PROCEDURE — 63600175 PHARM REV CODE 636 W HCPCS: Performed by: ANESTHESIOLOGY

## 2018-09-13 PROCEDURE — 25000003 PHARM REV CODE 250: Performed by: STUDENT IN AN ORGANIZED HEALTH CARE EDUCATION/TRAINING PROGRAM

## 2018-09-13 PROCEDURE — 86850 RBC ANTIBODY SCREEN: CPT

## 2018-09-13 RX ORDER — HYDROCODONE BITARTRATE AND ACETAMINOPHEN 10; 325 MG/1; MG/1
1 TABLET ORAL 2 TIMES DAILY PRN
Status: DISCONTINUED | OUTPATIENT
Start: 2018-09-13 | End: 2018-09-14 | Stop reason: HOSPADM

## 2018-09-13 RX ORDER — TIZANIDINE 4 MG/1
4 TABLET ORAL NIGHTLY
Status: DISCONTINUED | OUTPATIENT
Start: 2018-09-13 | End: 2018-09-14 | Stop reason: HOSPADM

## 2018-09-13 RX ORDER — MUPIROCIN 20 MG/G
OINTMENT TOPICAL
Status: DISCONTINUED | OUTPATIENT
Start: 2018-09-13 | End: 2018-09-13

## 2018-09-13 RX ORDER — TOPIRAMATE 100 MG/1
100 TABLET, FILM COATED ORAL 2 TIMES DAILY
Status: DISCONTINUED | OUTPATIENT
Start: 2018-09-13 | End: 2018-09-14 | Stop reason: HOSPADM

## 2018-09-13 RX ORDER — IBUPROFEN 200 MG
16 TABLET ORAL
Status: DISCONTINUED | OUTPATIENT
Start: 2018-09-13 | End: 2018-09-14 | Stop reason: HOSPADM

## 2018-09-13 RX ORDER — CEFAZOLIN SODIUM 1 G/3ML
2 INJECTION, POWDER, FOR SOLUTION INTRAMUSCULAR; INTRAVENOUS
Status: DISCONTINUED | OUTPATIENT
Start: 2018-09-13 | End: 2018-09-13

## 2018-09-13 RX ORDER — LOSARTAN POTASSIUM 50 MG/1
100 TABLET ORAL DAILY
Status: DISCONTINUED | OUTPATIENT
Start: 2018-09-13 | End: 2018-09-14 | Stop reason: HOSPADM

## 2018-09-13 RX ORDER — POTASSIUM CHLORIDE 750 MG/1
10 CAPSULE, EXTENDED RELEASE ORAL NIGHTLY
Status: DISCONTINUED | OUTPATIENT
Start: 2018-09-13 | End: 2018-09-14 | Stop reason: HOSPADM

## 2018-09-13 RX ORDER — FENTANYL CITRATE 50 UG/ML
50 INJECTION, SOLUTION INTRAMUSCULAR; INTRAVENOUS
Status: DISCONTINUED | OUTPATIENT
Start: 2018-09-13 | End: 2018-09-13

## 2018-09-13 RX ORDER — GLUCAGON 1 MG
1 KIT INJECTION
Status: DISCONTINUED | OUTPATIENT
Start: 2018-09-13 | End: 2018-09-14 | Stop reason: HOSPADM

## 2018-09-13 RX ORDER — FENTANYL CITRATE 50 UG/ML
25 INJECTION, SOLUTION INTRAMUSCULAR; INTRAVENOUS EVERY 5 MIN PRN
Status: CANCELLED | OUTPATIENT
Start: 2018-09-13

## 2018-09-13 RX ORDER — FUROSEMIDE 20 MG/1
20 TABLET ORAL DAILY
Status: DISCONTINUED | OUTPATIENT
Start: 2018-09-14 | End: 2018-09-14 | Stop reason: HOSPADM

## 2018-09-13 RX ORDER — MEPERIDINE HYDROCHLORIDE 50 MG/ML
12.5 INJECTION INTRAMUSCULAR; INTRAVENOUS; SUBCUTANEOUS ONCE AS NEEDED
Status: CANCELLED | OUTPATIENT
Start: 2018-09-13 | End: 2018-09-13

## 2018-09-13 RX ORDER — DIPHENHYDRAMINE HYDROCHLORIDE 50 MG/ML
25 INJECTION INTRAMUSCULAR; INTRAVENOUS EVERY 6 HOURS PRN
Status: CANCELLED | OUTPATIENT
Start: 2018-09-13

## 2018-09-13 RX ORDER — FAMOTIDINE 20 MG/1
20 TABLET, FILM COATED ORAL 2 TIMES DAILY
Status: DISCONTINUED | OUTPATIENT
Start: 2018-09-13 | End: 2018-09-14 | Stop reason: HOSPADM

## 2018-09-13 RX ORDER — HYDROMORPHONE HYDROCHLORIDE 1 MG/ML
0.2 INJECTION, SOLUTION INTRAMUSCULAR; INTRAVENOUS; SUBCUTANEOUS EVERY 5 MIN PRN
Status: CANCELLED | OUTPATIENT
Start: 2018-09-13

## 2018-09-13 RX ORDER — IBUPROFEN 200 MG
24 TABLET ORAL
Status: DISCONTINUED | OUTPATIENT
Start: 2018-09-13 | End: 2018-09-14 | Stop reason: HOSPADM

## 2018-09-13 RX ORDER — LOSARTAN POTASSIUM 50 MG/1
100 TABLET ORAL DAILY
Status: DISCONTINUED | OUTPATIENT
Start: 2018-09-14 | End: 2018-09-13

## 2018-09-13 RX ORDER — ONDANSETRON 2 MG/ML
4 INJECTION INTRAMUSCULAR; INTRAVENOUS ONCE AS NEEDED
Status: CANCELLED | OUTPATIENT
Start: 2018-09-13 | End: 2018-09-13

## 2018-09-13 RX ORDER — MUPIROCIN 20 MG/G
1 OINTMENT TOPICAL
Status: COMPLETED | OUTPATIENT
Start: 2018-09-13 | End: 2018-09-13

## 2018-09-13 RX ORDER — TRAMADOL HYDROCHLORIDE 50 MG/1
100 TABLET ORAL 2 TIMES DAILY
Status: DISCONTINUED | OUTPATIENT
Start: 2018-09-13 | End: 2018-09-14 | Stop reason: HOSPADM

## 2018-09-13 RX ADMIN — POTASSIUM CHLORIDE 10 MEQ: 750 CAPSULE, EXTENDED RELEASE ORAL at 11:09

## 2018-09-13 RX ADMIN — MUPIROCIN 1 G: 20 OINTMENT TOPICAL at 02:09

## 2018-09-13 RX ADMIN — HYDROCODONE BITARTRATE AND ACETAMINOPHEN 1 TABLET: 10; 325 TABLET ORAL at 11:09

## 2018-09-13 RX ADMIN — FENTANYL CITRATE 50 MCG: 50 INJECTION INTRAMUSCULAR; INTRAVENOUS at 05:09

## 2018-09-13 RX ADMIN — TRAMADOL HYDROCHLORIDE 100 MG: 50 TABLET, FILM COATED ORAL at 11:09

## 2018-09-13 RX ADMIN — LOSARTAN POTASSIUM 100 MG: 50 TABLET ORAL at 11:09

## 2018-09-13 RX ADMIN — TIZANIDINE 4 MG: 4 TABLET ORAL at 11:09

## 2018-09-13 RX ADMIN — FAMOTIDINE 20 MG: 20 TABLET ORAL at 11:09

## 2018-09-13 RX ADMIN — TOPIRAMATE 100 MG: 100 TABLET, FILM COATED ORAL at 11:09

## 2018-09-13 RX ADMIN — FENTANYL CITRATE 50 MCG: 50 INJECTION INTRAMUSCULAR; INTRAVENOUS at 02:09

## 2018-09-13 NOTE — PROGRESS NOTES
Patient returned from US, assisted patient to restroom using a rolling walker, tolerated activity well, daughter at bedside, will continue to monitor.

## 2018-09-13 NOTE — PROGRESS NOTES
Patient's B/P elevated 195/70, c/o pain 6/10 at rest and 9/10 with activity, Dr. Martins notified, he will come to bedside and assess patient.

## 2018-09-13 NOTE — INTERVAL H&P NOTE
Noted    The patient has been examined and the H&P has been reviewed:    I concur with the findings and no changes have occurred since H&P was written.    Anesthesia/Surgery risks, benefits and alternative options discussed and understood by patient/family.          Active Hospital Problems    Diagnosis  POA    Lumbar spondylosis [M47.816]  Yes      Resolved Hospital Problems   No resolved problems to display.

## 2018-09-13 NOTE — PROGRESS NOTES
BLE moderate to severe edema, red and hot to the touch, Dr. Lin and Dr. Bear notified, pain medication given as ordered by Dr. Bear and patient reports moderate relief, will continue to monitor.

## 2018-09-14 ENCOUNTER — ANESTHESIA EVENT (OUTPATIENT)
Dept: SURGERY | Facility: HOSPITAL | Age: 63
DRG: 454 | End: 2018-09-14
Payer: COMMERCIAL

## 2018-09-14 ENCOUNTER — TELEPHONE (OUTPATIENT)
Dept: NEUROSURGERY | Facility: CLINIC | Age: 63
End: 2018-09-14

## 2018-09-14 VITALS
HEART RATE: 87 BPM | RESPIRATION RATE: 20 BRPM | BODY MASS INDEX: 43.05 KG/M2 | OXYGEN SATURATION: 97 % | DIASTOLIC BLOOD PRESSURE: 60 MMHG | TEMPERATURE: 99 F | HEIGHT: 63 IN | WEIGHT: 243 LBS | SYSTOLIC BLOOD PRESSURE: 130 MMHG

## 2018-09-14 DIAGNOSIS — M47.816 LUMBAR SPONDYLOSIS: Primary | ICD-10-CM

## 2018-09-14 DIAGNOSIS — M54.50 MIDLINE LOW BACK PAIN WITHOUT SCIATICA, UNSPECIFIED CHRONICITY: Chronic | ICD-10-CM

## 2018-09-14 PROCEDURE — 99225 PR SUBSEQUENT OBSERVATION CARE,LEVEL II: CPT | Mod: ,,, | Performed by: PHYSICIAN ASSISTANT

## 2018-09-14 PROCEDURE — 25000003 PHARM REV CODE 250: Performed by: STUDENT IN AN ORGANIZED HEALTH CARE EDUCATION/TRAINING PROGRAM

## 2018-09-14 RX ADMIN — HYDROCODONE BITARTRATE AND ACETAMINOPHEN 1 TABLET: 10; 325 TABLET ORAL at 06:09

## 2018-09-14 RX ADMIN — TRAMADOL HYDROCHLORIDE 100 MG: 50 TABLET, FILM COATED ORAL at 10:09

## 2018-09-14 RX ADMIN — FUROSEMIDE 20 MG: 20 TABLET ORAL at 10:09

## 2018-09-14 RX ADMIN — FAMOTIDINE 20 MG: 20 TABLET ORAL at 10:09

## 2018-09-14 RX ADMIN — TOPIRAMATE 100 MG: 100 TABLET, FILM COATED ORAL at 10:09

## 2018-09-14 NOTE — PLAN OF CARE
Problem: Patient Care Overview  Goal: Plan of Care Review  Outcome: Ongoing (interventions implemented as appropriate)  Pt remains free of falls and injury. Family member remained in room overnight. Pt states is anxious for discharge. Bed low and locked, Call light within reach.

## 2018-09-14 NOTE — PLAN OF CARE
09/14/18 1306   Final Note   Assessment Type Final Discharge Note   Discharge Disposition Home   What phone number can be called within the next 1-3 days to see how you are doing after discharge? (spouse Deven Fermin [phone])   Hospital Follow Up  Appt(s) scheduled? No  (patient has scheduled appointments in place)   Discharge plans and expectations educations in teach back method with documentation complete? (per staff nurse)   Right Care Referral Info   Post Acute Recommendation No Care         [id number] - [place], [place] - [address]  [address]  [place] [address]  Phone: [phone] Fax: [phone]    [facility]ESS[facility]OMBARDI [facility] [place], [place] - [address]  [address]  [place] [address]  Phone: [phone] Fax: [phone]    [facility] - [place], [place] - [address]  [address]  [place] [address]  Phone: [phone] Fax: [phone]      Future Appointments   Date Time Provider Department Center   9/18/2018  8:15 AM Pebbles Vasquez NP [facility] NEURO [facility]   10/1/2018 10:20 AM Katy Spicer RN [facility] NEUROS[address]   10/9/2018  6:55 AM [facility] LAB [facility]   10/16/2018  9:00 AM Chery Lopez MD [facility] INFECT [facility]   10/31/2018  9:00 AM [facility] EOS [facility]   10/31/2018 10:45 AM Noel Bernal MD [facility] NEUROS[address]   1/30/2019  8:00 AM ORTHO SARAI B [facility] ORTHO [facility]       Patient discharge home with daughter.    Nilsa Cj RN/BSN/CM  [phone]  [facility]

## 2018-09-14 NOTE — ANESTHESIA PREPROCEDURE EVALUATION
Anesthesia Assessment: Preoperative EQUATION     Planned Procedure: Procedure(s) (LRB):  FUSION, SPINE, LUMBAR, TLIF, MINIMALLY INVASIVE @ L4/L5 & L5/S1 (N/A)  Requested Anesthesia Type:General  Surgeon: Noel Bernal MD  Service: Neurosurgery  Known or anticipated Date of Surgery:9/17/2018     Surgeon notes: reviewed     Electronic QUestionnaire Assessment completed via nurse interview with patient.     NO AQ     Triage considerations:         Previous anesthesia records:MAC and No problems  12/11/2017 COLONOSCOPY  Airway/Jaw/Neck:  Airway Findings: Mouth Opening: Normal Tongue: Normal  General Airway Assessment: Adult  Mallampati: II  TM Distance: Normal, at least 6 cm  Jaw/Neck Findings:  Neck ROM: Normal ROM            Last PCP note: within 3 months , within [facility]   Subspecialty notes: Gastroenterology, Neurology, NEUROSURGERY     Other important co-morbidities: PER Epic: HTN, MORBID OBESE, H/O DVT     Tests already available:  Available tests,  within 3 months , within [facility] .7/20/2018 CMP,CBC,7/19/2018 UA, MICRO UA,7/13/2018 HGA1C, 4/11/2018 TSH     Instructions given. (See in Nurse's note)     Optimization:  Anesthesia Preop Clinic Assessment  Indicated    Medical Opinion Indicated     Plan:    Testing:  PT/INR, PTT and EKG   Pre-anesthesia  visit                                        Visit focus: concerns in complex and/or prolonged anesthesia                           Consultation:IM Perioperative Hospitalist                           Patient  has previously scheduled Medical Appointment:9/4 CT NON CONTRAST     Navigation: Tests Scheduled.TBD                         Consults scheduled.TBD                        Results will be tracked by Preop Clinic.                  Electronically signed by Meghana Omalley RN at 8/24/2018  9:59 AM       Review of Systems  Anesthesia Hx:  No problems with previous Anesthesia History of prior surgery of interest to airway management or planning: Previous anesthesia:  General, Nerve Block colonoscopy 12/2017 with general anesthesia.  Procedure performed at an [facility] Facility. for L TKA 2015.  Procedure performed at an [facility] Facility. Denies Family Hx of Anesthesia complications.    Social:  Non-Smoker, No Alcohol Use    Hematology/Oncology:  Hematology Normal   Oncology Normal      EENT/Dental:   Reading glasses   Cardiovascular:   Hypertension H/o DVT 1990's Functional Capacity good / => 4 METS, currently walking with walker; works as SW; climb 1 FOS; reports SOB on exertion which is not new; denies CP    Pulmonary:   Denies Asthma.  Denies Shortness of breath.  Denies Sleep Apnea.    Hepatic/GI:   GERD (takes Zantac), well controlled    Musculoskeletal:   Arthritis (OA s/p L TKA 2015)  Spondylolisthesis; bulging disc    States had steroid injection in hip approx 2.5 months ago Spine Disorders: lumbar Chronic Pain    Neurological:   Denies CVA. Neuromuscular Disease,  Denies Seizures. BLE weakness Pain , onset is chronic , location of back , quality of aching/dull, throbbing, sharp , severity is a 7    Endocrine:   Denies Diabetes.          Physical Exam  General:  Obesity    Airway/Jaw/Neck:  Airway Findings: Mouth Opening: Normal Tongue: Normal  General Airway Assessment: Adult  Jaw/Neck Findings:  Neck ROM: Extension Decreased, Mild  Neck Findings:  Short Neck      Dental:  Dental Findings: In tact         Mental Status:  Mental Status Findings:  Cooperative, Alert and Oriented        Pt was seen in POC 9/5/18; Medical optimization: please see EPIC notes for recommendations of pre-op medical consultant, Dr Thorpe, for perioperative medical management./Josefina Barajas RN                                                                                                                     09/17/2018  Rosa Fermin is a 63 y.o., female.  Pre-operative evaluation for Procedure(s) (LRB):  FUSION, SPINE, LUMBAR, TLIF, MINIMALLY INVASIVE @ L4-L5 & L5-S1  (N/A)    Rosa Fermin is a 63 y.o. female with PMhx of Hep C, OA, HTN, DVT (1997), and lumbar spondylosis who presents for the above procedure.    LDA:     Prev airway:   PIV in R antecubital    Drips: none    Patient Active Problem List   Diagnosis    HTN (hypertension)    Degenerative arthritis    Osteoarthritis of both knees    Osteoarthritis of both knees, unspecified osteoarthritis type    Deep vein thrombosis prophylaxis    Gait abnormality    Knee stiffness    Left knee pain    Left leg weakness    Midline low back pain without sciatica    Chronic, continuous use of opioids    Hepatitis C antibody test positive    H. pylori infection    BMI 40.0-44.9, adult    History of DVT (deep vein thrombosis)- in 1997 - Provoked DVT    Edema    Acid reflux    History of cardiac cath    Lumbar spondylosis       Review of patient's allergies indicates:   Allergen Reactions    Vistaril [hydroxyzine hcl] Rash        Current Facility-Administered Medications on File Prior to Encounter   Medication Dose Route Frequency Provider Last Rate Last Dose    dextrose 50% injection 12.5 g  12.5 g Intravenous PRN Smith Lin MD        dextrose 50% injection 25 g  25 g Intravenous PRN Smith Lin MD        famotidine tablet 20 mg  20 mg Oral BID Smith Lin MD   20 mg at 09/14/18 1004    furosemide tablet 20 mg  20 mg Oral Daily Smith Lin MD   20 mg at 09/14/18 1004    glucagon (human recombinant) injection 1 mg  1 mg Intramuscular PRN Smith Lin MD        glucose chewable tablet 16 g  16 g Oral PRN Smith Lin MD        glucose chewable tablet 16 g  16 g Oral PRN Smith Lin MD        glucose chewable tablet 24 g  24 g Oral PRN Smith Lin MD        HYDROcodone-acetaminophen  mg per tablet 1 tablet  1 tablet Oral BID PRN Smith Lin MD   1 tablet at 09/14/18 0622    losartan tablet  100 mg  100 mg Oral Daily Agustín Wing MD   100 mg at 09/13/18 2302    potassium chloride CR capsule 10 mEq  10 mEq Oral QHS Smith Lin MD   10 mEq at 09/13/18 2301    tiZANidine tablet 4 mg  4 mg Oral QHS Smith Lin MD   4 mg at 09/13/18 2301    topiramate tablet 100 mg  100 mg Oral BID Smith Lin MD   100 mg at 09/14/18 1004    traMADol tablet 100 mg  100 mg Oral BID Smith Lin MD   100 mg at 09/14/18 1003     Current Outpatient Medications on File Prior to Encounter   Medication Sig Dispense Refill    acetaminophen (TYLENOL) 650 MG TbSR Take 650 mg by mouth as needed.      diclofenac sodium 1 % Gel Apply 2 g topically 3 (three) times daily as needed. 1 Tube 1    ergocalciferol (ERGOCALCIFEROL) 50,000 unit Cap Take 1 capsule (50,000 Units total) by mouth every 7 days. (Patient taking differently: Take 50,000 Units by mouth every 7 days. Sunday) 12 capsule 3    furosemide (LASIX) 20 MG tablet Take 1 tablet (20 mg total) by mouth once daily. (Patient taking differently: Take 20 mg by mouth every evening. ) 90 tablet 3    glucosamine sulfate 750 mg Tab Take 1 tablet by mouth 2 (two) times daily. (Patient taking differently: Take 1 tablet by mouth every evening. ) 60 each 2    HYDROcodone-acetaminophen (NORCO)  mg per tablet Take 1 tablet by mouth 2 (two) times daily as needed. 60 tablet 0    losartan (COZAAR) 100 MG tablet Take 1 tablet (100 mg total) by mouth once daily. (Patient taking differently: Take 100 mg by mouth every evening. ) 90 tablet 3    meloxicam (MOBIC) 15 MG tablet Take 1 tablet (15 mg total) by mouth once daily. (Patient taking differently: Take 15 mg by mouth every evening. ) 30 tablet 5    multivitamin with minerals (HAIR,SKIN AND NAILS) tablet Take 2 tablets by mouth every evening.       potassium chloride SA (K-DUR,KLOR-CON) 10 MEQ tablet Take 1 tablet (10 mEq total) by mouth once daily. (Patient taking differently:  Take 10 mEq by mouth every evening. ) 90 tablet 3    ranitidine (ZANTAC) 150 MG tablet Take 150 mg by mouth nightly.      tiZANidine (ZANAFLEX) 4 MG tablet Take 1 tablet (4 mg total) by mouth every evening. 90 tablet 3    topiramate (TOPAMAX) 100 MG tablet Take 1 tablet (100 mg total) by mouth 2 (two) times daily. 60 tablet 5    traMADol (ULTRAM) 50 mg tablet Take 2 tablets (100 mg total) by mouth 2 (two) times daily. (Patient taking differently: Take 100 mg by mouth every evening. Plus as needed 1-2 tablets during the day time for pain) 120 tablet 3       Past Surgical History:   Procedure Laterality Date    COLONOSCOPY  2007    Dr Bolton    COLONOSCOPY N/A 12/11/2017    Procedure: COLONOSCOPY;  Surgeon: Shanelle Braga MD;  Location: [facility];  Service: Endoscopy;  Laterality: N/A;    COLONOSCOPY N/A 12/11/2017    [facility]    ENDOMETRIAL ABLATION N/A 2003    REPLACEMENT-KNEE-TOTAL   Left 11/19/2015    Performed by Mitch Mujica MD at [facility] OR    SHOULDER ARTHROSCOPY W/ ROTATOR CUFF REPAIR Bilateral 2012, 2013    TOTAL KNEE ARTHROPLASTY Left        Social History     Socioeconomic History    Marital status:      Spouse name: Not on file    Number of children: 2    Years of education: 19    Highest education level: Not on file   Social Needs    Financial resource strain: Not on file    Food insecurity - worry: Not on file    Food insecurity - inability: Not on file    Transportation needs - medical: Not on file    Transportation needs - non-medical: Not on file   Occupational History    Not on file   Tobacco Use    Smoking status: Never Smoker    Smokeless tobacco: Never Used   Substance and Sexual Activity    Alcohol use: No     Alcohol/week: 0.0 oz    Drug use: No    Sexual activity: Yes     Partners: Male   Other Topics Concern    Not on file   Social History Narrative    Not on file         Vital Signs Range (Last  24H):  BP: ()/()   Arterial Line BP: ()/()       Lab Results   Component Value Date    WBC 9.33 07/20/2018    HGB 12.5 07/20/2018    HCT 38.9 07/20/2018    MCV 85 07/20/2018     07/20/2018     CMP  Sodium   Date Value Ref Range Status   07/20/2018 138 136 - 145 mmol/L Final     Potassium   Date Value Ref Range Status   07/20/2018 3.9 3.5 - 5.1 mmol/L Final     Chloride   Date Value Ref Range Status   07/20/2018 102 95 - 110 mmol/L Final     CO2   Date Value Ref Range Status   07/20/2018 23 23 - 29 mmol/L Final     Glucose   Date Value Ref Range Status   07/20/2018 120 (H) 70 - 110 mg/dL Final     BUN, Bld   Date Value Ref Range Status   07/20/2018 12 8 - 23 mg/dL Final     Creatinine   Date Value Ref Range Status   07/20/2018 0.8 0.5 - 1.4 mg/dL Final     Calcium   Date Value Ref Range Status   07/20/2018 9.9 8.7 - 10.5 mg/dL Final     Total Protein   Date Value Ref Range Status   07/20/2018 8.9 (H) 6.0 - 8.4 g/dL Final     Albumin   Date Value Ref Range Status   07/20/2018 4.1 3.5 - 5.2 g/dL Final     Total Bilirubin   Date Value Ref Range Status   07/20/2018 0.7 0.1 - 1.0 mg/dL Final     Comment:     For infants and newborns, interpretation of results should be based  on gestational age, weight and in agreement with clinical  observations.  Premature Infant recommended reference ranges:  Up to 24 hours.............<8.0 mg/dL  Up to 48 hours............<12.0 mg/dL  3-5 days..................<15.0 mg/dL  6-29 days.................<15.0 mg/dL       Alkaline Phosphatase   Date Value Ref Range Status   07/20/2018 90 55 - 135 U/L Final     AST   Date Value Ref Range Status   07/20/2018 15 10 - 40 U/L Final     ALT   Date Value Ref Range Status   07/20/2018 17 10 - 44 U/L Final     Anion Gap   Date Value Ref Range Status   07/20/2018 13 8 - 16 mmol/L Final     eGFR if    Date Value Ref Range Status   07/20/2018 >60.0 >60 mL/min/1.73 m^2 Final     eGFR if non    Date Value Ref Range  Status   07/20/2018 >60.0 >60 mL/min/1.73 m^2 Final     Comment:     Calculation used to obtain the estimated glomerular filtration  rate (eGFR) is the CKD-EPI equation.        Lab Results   Component Value Date    INR 0.9 09/05/2018    INR 1.6 (H) 12/17/2015    INR 1.7 12/10/2015           Diagnostic Studies:  (resting HR 75bpm, peak HR 179bpm)  Impression: ABNORMAL MYOCARDIAL PERFUSION  1. There is evidence for a moderate sized area of moderate myocardial ischemia that extends from the base to the mid anterior wall of the left ventricle and a moderate sized area of moderate myocardial ischemia that extends from the base to the mid   inferior wall of the left ventricle.   2. The perfusion scan is free of evidence for myocardial injury.    4. The ventricular volumes are normal at rest and stress.   5. The extracardiac distribution of radioactivity is normal.     EKG Conclusions:    1. The EKG portion of this study is negative for ischemia at a peak heart rate of 179 bpm (116% of predicted).   2. Blood pressure remained stable throughout the protocol  (Presenting BP: 164/94 Peak BP: 171/102).   3. No significant arrhythmias were present.   4. There were no symptoms of chest discomfort or significant dyspnea throughout the protocol.   This document has been electronically    SIGNED BY: Derrick Romero MD On: 08/11/2015 15:56    EKG:  Vent. Rate : 106 BPM     Atrial Rate : 106 BPM     P-R Int : 132 ms          QRS Dur : 098 ms      QT Int : 340 ms       P-R-T Axes : 053 007 070 degrees     QTc Int : 451 ms    Sinus tachycardia  Otherwise normal ECG  When compared with ECG of 02-JUL-2015 14:32,  No significant change was found  Confirmed by Gene CAMPBELL, Frank ([id number]) on 9/5/2018 1:24:44 PM    2D Echo:  None      Anesthesia Evaluation    I have reviewed the Patient Summary Reports.    I have reviewed the Nursing Notes.      Review of Systems  Anesthesia Hx:  No previous Anesthesia  History of prior surgery of interest to airway  management or planning: Denies Family Hx of Anesthesia complications.   Denies Personal Hx of Anesthesia complications.   Social:  Non-Smoker    Cardiovascular:   Hypertension Denies MI.       no hyperlipidemia  Deep Venous Thrombosis (DVT)    Pulmonary:  Pulmonary Normal    Renal/:  Renal/ Normal     Hepatic/GI:   GERD    Musculoskeletal:   Arthritis     Neurological:   Denies CVA. Neuromuscular Disease, (carpel tunnel)  Denies Seizures.    Endocrine:  Endocrine Normal        Physical Exam  General:  Obesity    Airway/Jaw/Neck:  Airway Findings: Mouth Opening: Normal Tongue: Normal  General Airway Assessment: Adult  Mallampati: III  Improves to II with phonation.  TM Distance: Normal, at least 6 cm     Eyes/Ears/Nose:  EYES/EARS/NOSE FINDINGS: Normal   Dental:  DENTAL FINDINGS: Normal   Chest/Lungs:  Chest/Lungs Clear    Heart/Vascular:  Heart Findings: Normal       Mental Status:  Mental Status Findings: Normal        Anesthesia Plan  Type of Anesthesia, risks & benefits discussed:  Anesthesia Type:  general  Patient's Preference:   Intra-op Monitoring Plan: arterial line and standard ASA monitors  Intra-op Monitoring Plan Comments:   Post Op Pain Control Plan: multimodal analgesia, IV/PO Opioids PRN and per primary service following discharge from PACU  Post Op Pain Control Plan Comments:   Induction:   IV  Beta Blocker:  Patient is not currently on a Beta-Blocker (No further documentation required).       Informed Consent: Patient understands risks and agrees with Anesthesia plan.  Questions answered. Anesthesia consent signed with patient.  ASA Score: 3     Day of Surgery Review of History & Physical:            Ready For Surgery From Anesthesia Perspective.

## 2018-09-14 NOTE — PLAN OF CARE
[facility] [address] [facility], [place] - [address]  [address] [facility]  [place] [address]  Phone: [phone] Fax: [phone]    JESSICA LOMBARDI [facility], [place] - [address]  [address]  [place] [address]  Phone: [phone] Fax: [phone]    [facility] - [place], [place] - [address]  [address] [facility] [place] [address]  Phone: [phone] Fax: [phone]      This CM spoke with patient and daughter at bedside. Patient states she will be discharging on today, her procedure was push back to next week.       09/14/18 1022   Discharge Assessment   Assessment Type Discharge Planning Assessment   Confirmed/corrected address and phone number on facesheet? Yes   Assessment information obtained from? Patient   Expected Length of Stay (days) 3   Communicated expected length of stay with patient/caregiver no   Prior to hospitilization cognitive status: Alert/Oriented   Prior to hospitalization functional status: Assistive Equipment   Current cognitive status: Alert/Oriented   Current Functional Status: Assistive Equipment   Facility Arrived From: (Direct admit)   Lives With spouse   Able to Return to Prior Arrangements yes   Is patient able to care for self after discharge? Yes   Who are your caregiver(s) and their phone number(s)? (spouse Deven Fermin [phone])   Patient's perception of discharge disposition home or selfcare   Readmission Within The Last 30 Days no previous admission in last 30 days   Patient currently being followed by outpatient case management? No   Patient currently receives any other outside agency services? No   Equipment Currently Used at Home rollator   Do you have any problems affording any of your prescribed medications? No   Is the patient taking medications as prescribed? yes   Does the patient have transportation home? Yes   Transportation Available family or friend will provide   Does the patient receive services at the  Coumadin Clinic? No   Discharge Plan A Home   Patient/Family In Agreement With Plan yes     Nilsa Corona RN/BSN/QUE  [phone]  [facility]

## 2018-09-14 NOTE — SUBJECTIVE & OBJECTIVE
Interval History: NAEON. Pt was scheduled for OR yesterday for TLIF at L4-L5 & L5-S1. There was some concerns over possible DVT in BLE, imaging was negative, however surgery was rescheduled for 9/17. Pt and daughter agreed. On exam this morning, pt denies increased weakness, paresthesias, numbness, or bowel/bladder dysfunction.         Medications:  Continuous Infusions:  Scheduled Meds:   famotidine  20 mg Oral BID    furosemide  20 mg Oral Daily    losartan  100 mg Oral Daily    potassium chloride  10 mEq Oral QHS    tiZANidine  4 mg Oral QHS    topiramate  100 mg Oral BID    traMADol  100 mg Oral BID     PRN Meds:dextrose 50%, dextrose 50%, glucagon (human recombinant), glucose, glucose, glucose, HYDROcodone-acetaminophen       Objective:     Weight: 110.2 kg (243 lb)  Body mass index is 43.05 kg/m².  Vital Signs (Most Recent):  Temp: 99.1 °F (37.3 °C) (09/14/18 1104)  Pulse: 87 (09/14/18 1104)  Resp: 20 (09/14/18 1104)  BP: 130/60 (09/14/18 1104)  SpO2: 97 % (09/14/18 1104) Vital Signs (24h Range):  Temp:  [98.1 °F (36.7 °C)-100.2 °F (37.9 °C)] 99.1 °F (37.3 °C)  Pulse:  [] 87  Resp:  [16-20] 20  SpO2:  [95 %-100 %] 97 %  BP: (128-195)/(60-80) 130/60     Date 09/14/18 0700 - 09/15/18 0659(Discharged)   Shift 4371-1722 8281-5206 6856-7071 24 Hour Total   INTAKE   P.O. 275   275   Shift Total(mL/kg) 275(2.5)   275(2.5)   OUTPUT   Shift Total(mL/kg)       Weight (kg) 110.2 110.2 110.2 110.2                 Neurosurgery Physical Exam     General: well developed, well nourished, no distress.   Head: normocephalic, atraumatic  Neurologic: Alert and oriented. Thought content appropriate.  GCS: Motor: 6/Verbal: 5/Eyes: 4 GCS Total: 15  Mental Status: Awake, Alert, Oriented x 4  Language: No aphasia  Speech: No dysarthria  Cranial nerves: face symmetric, tongue midline, CN II-XII grossly intact.   Eyes: pupils equal, round, reactive to light with accomodation, EOMI.   Pulmonary: no signs of respiratory  distress  Sensory: slight decrease RLE  Motor Strength: Moves all extremities spontaneously with good tone. No abnormal movements seen. Bilateral knee flexion limited secondary to pain.     Strength  Deltoids Triceps Biceps Wrist Extension Wrist Flexion Hand    Upper: R 5/5 5/5 5/5 5/5 5/5 5/5    L 5/5 5/5 5/5 5/5 5/5 5/5     Iliopsoas Quadriceps Knee  Flexion Tibialis  anterior Gastro- cnemius EHL   Lower: R 4/5 5/5 4/5 5/5 5/5 4/5    L 4/5 5/5 5/5 5/5 5/5 4/5     Pronator Drift: no drift noted  Finger-to-nose: Intact bilaterally  Hallman: absent  Clonus: absent  Babinski: absent  Skin: Skin is warm, dry and intact. No erythema noted to BLE. Hyperpigmentation to LLE.       Significant Labs:  No results for input(s): GLU, NA, K, CL, CO2, BUN, CREATININE, CALCIUM, MG in the last 48 hours.  No results for input(s): WBC, HGB, HCT, PLT in the last 48 hours.  No results for input(s): LABPT, INR, APTT in the last 48 hours.  Microbiology Results (last 7 days)     ** No results found for the last 168 hours. **        Recent Lab Results       09/13/18  1420      Group & Rh A NEG     INDIRECT GUILLE NEG         All pertinent labs from the last 24 hours have been reviewed.    Significant Diagnostics:    US Lower Extremity Veins Bilateral 9/13: No evidence of deep venous thrombosis in either lower extremity.

## 2018-09-14 NOTE — NURSING TRANSFER
Nursing Transfer Note      9/13/2018     Transfer To: 931 From [facility] 27    Transfer via wheelchair    Transfer with daughter    Transported by escort     Medicines sent: none    Chart send with patient: Yes    Notified: Smith LORENZO    Patient reassessed at: 8402 9/13/18    Upon arrival to floor: patient oriented to room, call bell in reach and bed in lowest position    VSS. Pt able to tolerate PO intake and urinate in restroom. Daughter remains at bedside. Pt states pain is tolerable.

## 2018-09-14 NOTE — ASSESSMENT & PLAN NOTE
Pt is a 64 yo F with h/o back pain, worsening over the last two months. MRI lumber spine on 8/3 showed spondylolisthesis with associated irritation of the nerve root and lumbar canal stenosis at L5/S1, and broad-based disc herniation at the level above this at L4/L5. Plan for OR on 9/17 for two-level transforaminal lumbar interbody fusion (TLIF) at L4/5 and L5/S1.  - Neurologically stable  - Continue home meds   - BLE edema: resolved. BLE US vein negative for DVT. Will continue to monitor.    - Activity: activity as tolerated  - Diet: tolerating PO  - Voiding without difficulty   - Discussed discharge with patient and patient verbally understands. Will be admitted 9/17 for surgery.

## 2018-09-14 NOTE — DISCHARGE SUMMARY
[facility]  Neurosurgery  Discharge Summary      Patient Name: Rosa Fermin  MRN: [id number]  Admission Date: 9/13/2018  Hospital Length of Stay: 1 days  Discharge Date and Time:  09/14/2018 2:56 PM  Attending Physician: No att. providers found   Discharging Provider: Joselyn Bernal PA-C  Primary Care Provider: Chery Lopez MD    HPI:   The patient is a 63 y.o. female who was referred by Dr. Tapia. The pt complains of acute on chronic low back pain.  She has a long hx of back pain and was followed by an outside facility for her back pain for many years which was well-maintained with medication and TODD's. However, her back pain has been progressing since December but has significantly worsened within the last two months. She notes pain in her bilateral lower pain with intermittent radiation into the RLE. She states the pain has limited her physical activities and is exacerbated when sitting or laying supine. She has not been able to sleep on her bed because the pain is intolerable and is now sleeping on her couch. She rates the pain a 8-9/10 without pain medication and 3/10 with medication. She endorses BLE weakness which is worse on the left, secondary to back pain and chronic knee pain. She has a hx of left knee surgery. She works as a  in the setting of outpatient clinics and hospitals. The pt reports no hx of cigarette use. Pt has a hx of HTN but no cardiac hx.        Procedure(s) (LRB):  FUSION, SPINE, LUMBAR, TLIF, MINIMALLY INVASIVE @ L4/L5 & L5/S1 (N/A)     Hospital Course: The patient was admitted 9/13 for a scheduled procedure of TLIF at L4-L5 & L5-S1. In the pre-op area in the afternoon, per nursing note the pt reported increased pain somewhat alleviated with medication. Exam showed BLE moderate to severe edema, red and warm to touch. An ultrasound of the bilateral lower extremities was performed and showed no evidence of DVT. Dr. Bernal discussed with pt and  family that it would be safer for the surgery to be done on Monday. Pt and family agreed. Pt was transferred from pre-op area to neurosurgical floor to be monitored overnight. The patient did well overnight and was cleared to go home for the weekend. At time of discharge the patient was neurologically stable, was afebrile, and vital signs were stable.  The patient was tolerating a diet and voiding without difficulty.  The patient is being discharged to their home and instructed to return 9/17 for the above mentioned procedure.  Discharge instructions were verbally discussed with the patient.  The patient was also given a discharge instruction sheet explaining the aforementioned discussion.  The patient verbalized understanding of instructions and agreed to the plan.        Consults:     Significant Diagnostic Studies: Labs:   BMP: No results for input(s): GLU, NA, K, CL, CO2, BUN, CREATININE, CALCIUM, MG in the last 48 hours., CMP No results for input(s): NA, K, CL, CO2, GLU, BUN, CREATININE, CALCIUM, PROT, ALBUMIN, BILITOT, ALKPHOS, AST, ALT, ANIONGAP, ESTGFRAFRICA, EGFRNONAA in the last 48 hours., CBC No results for input(s): WBC, HGB, HCT, PLT in the last 48 hours. and INR   Lab Results   Component Value Date    INR 0.9 09/05/2018    INR 1.6 (H) 12/17/2015    INR 1.7 12/10/2015     Radiology: Ultrasound Lower Extremity Veins Bilateral -no evidence of DVT    Pending Diagnostic Studies:     None        Final Active Diagnoses:    Diagnosis Date Noted POA    PRINCIPAL PROBLEM:  Lumbar spondylosis [M47.816] 09/13/2018 Yes      Problems Resolved During this Admission:      Discharged Condition: good    Disposition: Home or Self Care    Follow Up:  Follow-up Information     [facility] - Neurosurgery 7th Fl On 9/17/2018.    Specialty:  Neurosurgery  Why:  Surgery is schedule 9/17/18  Contact information:  [facility]  [facility] [address] [phone]  Additional information:  7th Floor                Patient Instructions:   See detailed instructions in discharge paperwork.     Medications:  Reconciled Home Medications:      Medication List      ergocalciferol 50,000 unit Cap  Commonly known as:  ERGOCALCIFEROL  Take 1 capsule (50,000 Units total) by mouth every 7 days.     furosemide 20 MG tablet  Commonly known as:  LASIX  Take 1 tablet (20 mg total) by mouth once daily.     glucosamine sulfate 750 mg Tab  Take 1 tablet by mouth 2 (two) times daily.     losartan 100 MG tablet  Commonly known as:  COZAAR  Take 1 tablet (100 mg total) by mouth once daily.     potassium chloride SA 10 MEQ tablet  Commonly known as:  K-DUR,KLOR-CON  Take 1 tablet (10 mEq total) by mouth once daily.     traMADol 50 mg tablet  Commonly known as:  ULTRAM  Take 2 tablets (100 mg total) by mouth 2 (two) times daily.           acetaminophen 650 MG Tbsr  Commonly known as:  TYLENOL  Take 650 mg by mouth as needed.     diclofenac sodium 1 % Gel  Commonly known as:  VOLTAREN  Apply 2 g topically 3 (three) times daily as needed.     HYDROcodone-acetaminophen  mg per tablet  Commonly known as:  NORCO  Take 1 tablet by mouth 2 (two) times daily as needed.     ranitidine 150 MG tablet  Commonly known as:  ZANTAC  Take 150 mg by mouth nightly.     tiZANidine 4 MG tablet  Commonly known as:  ZANAFLEX  Take 1 tablet (4 mg total) by mouth every evening.     topiramate 100 MG tablet  Commonly known as:  TOPAMAX  Take 1 tablet (100 mg total) by mouth 2 (two) times daily.            Joselyn Bernal PA-C  Neurosurgery  [facility]

## 2018-09-14 NOTE — HOSPITAL COURSE
The patient was admitted 9/13 for a scheduled procedure of TLIF at L4-L5 & L5-S1. In the pre-op area in the afternoon, per nursing note the pt reported increased pain somewhat alleviated with medication. Exam showed BLE moderate to severe edema, red and warm to touch. An ultrasound of the bilateral lower extremities was performed and showed no evidence of DVT. Dr. Bernal discussed with pt and family that it would be safer for the surgery to be done on Monday. Pt and family agreed. Pt was transferred from pre-op area to neurosurgical floor to be monitored overnight. The patient did well overnight and was cleared to go home for the weekend. At time of discharge the patient was neurologically stable, was afebrile, and vital signs were stable.  The patient was tolerating a diet and voiding without difficulty.  The patient is being discharged to their home and instructed to return 9/17 for the above mentioned procedure.  Discharge instructions were verbally discussed with the patient.  The patient was also given a discharge instruction sheet explaining the aforementioned discussion.  The patient verbalized understanding of instructions and agreed to the plan.

## 2018-09-14 NOTE — NURSING
Pt has left the floor and does not need wheelchair escort b/c she is using her walker.  IV was discontinued, discharge instructions given and follow up with MD.  No other questions or concerns.

## 2018-09-14 NOTE — NURSING
Paged Neurosurgery on call/Dr. Bernal's medical team.  Informed that patient wanted to leave and was told that a NP was going to see her and discharge her in a few hours and that was at 8 am.  Pt stated that she will sign herself and leave AMA if nobody comes by 12 noon.  Neurosurgery was paged again a second time to notify of this. Charge nurse will be made aware.

## 2018-09-14 NOTE — HPI
The patient is a 63 y.o. female who was referred by Dr. Tapia. The pt complains of acute on chronic low back pain.  She has a long hx of back pain and was followed by an outside facility for her back pain for many years which was well-maintained with medication and TODD's. However, her back pain has been progressing since December but has significantly worsened within the last two months. She notes pain in her bilateral lower pain with intermittent radiation into the RLE. She states the pain has limited her physical activities and is exacerbated when sitting or laying supine. She has not been able to sleep on her bed because the pain is intolerable and is now sleeping on her couch. She rates the pain a 8-9/10 without pain medication and 3/10 with medication. She endorses BLE weakness which is worse on the left, secondary to back pain and chronic knee pain. She has a hx of left knee surgery. She works as a  in the setting of outpatient clinics and hospitals. The pt reports no hx of cigarette use. Pt has a hx of HTN but no cardiac hx.

## 2018-09-14 NOTE — PROGRESS NOTES
MD Bernal to bedside, stated surgery needed to be cancelled due to safety and equipment concerns. Pt to be admitted over night. Pt and daughter stated agreement.

## 2018-09-14 NOTE — PLAN OF CARE
Problem: Patient Care Overview  Goal: Plan of Care Review  Outcome: Outcome(s) achieved Date Met: 09/14/18  Pt is stable, received discharge paperwork, IV was discontinued and pt's ride is here to pick her up.  No other questions or concerns.  Pt has left floor with her daughter and belongings via her own rollator.

## 2018-09-14 NOTE — TELEPHONE ENCOUNTER
Patient contacted. Advised to arrive for surgery at 0500, DOSC, NPO after midnight the night before, shower x 2 with Hibiclens or Dial antibacterial soap. Understanding verbalized by patient.

## 2018-09-14 NOTE — PROGRESS NOTES
[facility]  Neurosurgery  Progress Note    Subjective:     History of Present Illness: The patient is a 63 y.o. female who was referred by Dr. Tapia. The pt complains of acute on chronic low back pain.  She has a long hx of back pain and was followed by an outside facility for her back pain for many years which was well-maintained with medication and TODD's. However, her back pain has been progressing since December but has significantly worsened within the last two months. She notes pain in her bilateral lower pain with intermittent radiation into the RLE. She states the pain has limited her physical activities and is exacerbated when sitting or laying supine. She has not been able to sleep on her bed because the pain is intolerable and is now sleeping on her couch. She rates the pain a 8-9/10 without pain medication and 3/10 with medication. She endorses BLE weakness which is worse on the left, secondary to back pain and chronic knee pain. She has a hx of left knee surgery. She works as a  in the setting of outpatient clinics and hospitals. The pt reports no hx of cigarette use. Pt has a hx of HTN but no cardiac hx.        Post-Op Info:  Procedure(s) (LRB):  FUSION, SPINE, LUMBAR, TLIF, MINIMALLY INVASIVE @ L4/L5 & L5/S1 (N/A)   1 Day Post-Op     Interval History: NAEON. Pt was scheduled for OR yesterday for TLIF at L4-L5 & L5-S1. There was some concerns over possible DVT in BLE, imaging was negative, however surgery was rescheduled for 9/17. Pt and daughter agreed. On exam this morning, pt denies increased weakness, paresthesias, numbness, or bowel/bladder dysfunction.         Medications:  Continuous Infusions:  Scheduled Meds:   famotidine  20 mg Oral BID    furosemide  20 mg Oral Daily    losartan  100 mg Oral Daily    potassium chloride  10 mEq Oral QHS    tiZANidine  4 mg Oral QHS    topiramate  100 mg Oral BID    traMADol  100 mg Oral BID     PRN Meds:dextrose  50%, dextrose 50%, glucagon (human recombinant), glucose, glucose, glucose, HYDROcodone-acetaminophen       Objective:     Weight: 110.2 kg (243 lb)  Body mass index is 43.05 kg/m².  Vital Signs (Most Recent):  Temp: 99.1 °F (37.3 °C) (09/14/18 1104)  Pulse: 87 (09/14/18 1104)  Resp: 20 (09/14/18 1104)  BP: 130/60 (09/14/18 1104)  SpO2: 97 % (09/14/18 1104) Vital Signs (24h Range):  Temp:  [98.1 °F (36.7 °C)-100.2 °F (37.9 °C)] 99.1 °F (37.3 °C)  Pulse:  [] 87  Resp:  [16-20] 20  SpO2:  [95 %-100 %] 97 %  BP: (128-195)/(60-80) 130/60     Date 09/14/18 0700 - 09/15/18 0659(Discharged)   Shift 7444-5304 2047-3707 6514-4349 24 Hour Total   INTAKE   P.O. 275   275   Shift Total(mL/kg) 275(2.5)   275(2.5)   OUTPUT   Shift Total(mL/kg)       Weight (kg) 110.2 110.2 110.2 110.2                 Neurosurgery Physical Exam     General: well developed, well nourished, no distress.   Head: normocephalic, atraumatic  Neurologic: Alert and oriented. Thought content appropriate.  GCS: Motor: 6/Verbal: 5/Eyes: 4 GCS Total: 15  Mental Status: Awake, Alert, Oriented x 4  Language: No aphasia  Speech: No dysarthria  Cranial nerves: face symmetric, tongue midline, CN II-XII grossly intact.   Eyes: pupils equal, round, reactive to light with accomodation, EOMI.   Pulmonary: no signs of respiratory distress  Sensory: slight decrease RLE  Motor Strength: Moves all extremities spontaneously with good tone. No abnormal movements seen. Bilateral knee flexion limited secondary to pain.     Strength  Deltoids Triceps Biceps Wrist Extension Wrist Flexion Hand    Upper: R 5/5 5/5 5/5 5/5 5/5 5/5    L 5/5 5/5 5/5 5/5 5/5 5/5     Iliopsoas Quadriceps Knee  Flexion Tibialis  anterior Gastro- cnemius EHL   Lower: R 4/5 5/5 4/5 5/5 5/5 4/5    L 4/5 5/5 5/5 5/5 5/5 4/5     Pronator Drift: no drift noted  Finger-to-nose: Intact bilaterally  Hallman: absent  Clonus: absent  Babinski: absent  Skin: Skin is warm, dry and intact. No erythema  noted to BLE. Hyperpigmentation to LLE.       Significant Labs:  No results for input(s): GLU, NA, K, CL, CO2, BUN, CREATININE, CALCIUM, MG in the last 48 hours.  No results for input(s): WBC, HGB, HCT, PLT in the last 48 hours.  No results for input(s): LABPT, INR, APTT in the last 48 hours.  Microbiology Results (last 7 days)     ** No results found for the last 168 hours. **        Recent Lab Results       09/13/18  1420      Group & Rh A NEG     INDIRECT GUILLE NEG         All pertinent labs from the last 24 hours have been reviewed.    Significant Diagnostics:    US Lower Extremity Veins Bilateral 9/13: No evidence of deep venous thrombosis in either lower extremity.     Assessment/Plan:     * Lumbar spondylosis    Pt is a 62 yo F with h/o back pain, worsening over the last two months. MRI lumber spine on 8/3 showed spondylolisthesis with associated irritation of the nerve root and lumbar canal stenosis at L5/S1, and broad-based disc herniation at the level above this at L4/L5. Plan for OR on 9/17 for two-level transforaminal lumbar interbody fusion (TLIF) at L4/5 and L5/S1.  - Neurologically stable  - Continue home meds   - BLE edema: resolved. BLE US vein negative for DVT. Will continue to monitor.    - Activity: activity as tolerated  - Diet: tolerating PO  - Voiding without difficulty   - Discussed discharge with patient and patient verbally understands. Will be admitted 9/17 for surgery.                       Joselyn Bernal PA-C  Neurosurgery  [facility]

## 2018-09-16 RX ORDER — MUPIROCIN 20 MG/G
1 OINTMENT TOPICAL
Status: CANCELLED | OUTPATIENT
Start: 2018-09-16

## 2018-09-17 ENCOUNTER — ANESTHESIA (OUTPATIENT)
Dept: SURGERY | Facility: HOSPITAL | Age: 63
DRG: 454 | End: 2018-09-17
Payer: COMMERCIAL

## 2018-09-17 ENCOUNTER — HOSPITAL ENCOUNTER (INPATIENT)
Facility: HOSPITAL | Age: 63
LOS: 3 days | Discharge: HOME HEALTH | DRG: 454 | End: 2018-09-20
Attending: NEUROLOGICAL SURGERY | Admitting: NEUROLOGICAL SURGERY
Payer: COMMERCIAL

## 2018-09-17 DIAGNOSIS — M43.16 SPONDYLOLISTHESIS, LUMBAR REGION: Primary | ICD-10-CM

## 2018-09-17 DIAGNOSIS — M47.816 LUMBAR SPONDYLOSIS: ICD-10-CM

## 2018-09-17 LAB
ABO + RH BLD: NORMAL
ANION GAP SERPL CALC-SCNC: 11 MMOL/L
APTT BLDCRRT: 22.3 SEC
BASOPHILS # BLD AUTO: 0.02 K/UL
BASOPHILS NFR BLD: 0.5 %
BILIRUB UR QL STRIP: NEGATIVE
BLD GP AB SCN CELLS X3 SERPL QL: NORMAL
BUN SERPL-MCNC: 14 MG/DL
CALCIUM SERPL-MCNC: 9.5 MG/DL
CHLORIDE SERPL-SCNC: 113 MMOL/L
CLARITY UR REFRACT.AUTO: CLEAR
CO2 SERPL-SCNC: 17 MMOL/L
COLOR UR AUTO: YELLOW
CREAT SERPL-MCNC: 0.7 MG/DL
DIFFERENTIAL METHOD: ABNORMAL
EOSINOPHIL # BLD AUTO: 0.2 K/UL
EOSINOPHIL NFR BLD: 3.7 %
ERYTHROCYTE [DISTWIDTH] IN BLOOD BY AUTOMATED COUNT: 15.5 %
EST. GFR  (AFRICAN AMERICAN): >60 ML/MIN/1.73 M^2
EST. GFR  (NON AFRICAN AMERICAN): >60 ML/MIN/1.73 M^2
GLUCOSE SERPL-MCNC: 93 MG/DL
GLUCOSE UR QL STRIP: NEGATIVE
HCT VFR BLD AUTO: 33.7 %
HGB BLD-MCNC: 10.6 G/DL
HGB UR QL STRIP: NEGATIVE
IMM GRANULOCYTES # BLD AUTO: 0.01 K/UL
IMM GRANULOCYTES NFR BLD AUTO: 0.2 %
INR PPP: 1
KETONES UR QL STRIP: NEGATIVE
LEUKOCYTE ESTERASE UR QL STRIP: NEGATIVE
LYMPHOCYTES # BLD AUTO: 1.3 K/UL
LYMPHOCYTES NFR BLD: 30.4 %
MCH RBC QN AUTO: 26.9 PG
MCHC RBC AUTO-ENTMCNC: 31.5 G/DL
MCV RBC AUTO: 86 FL
MONOCYTES # BLD AUTO: 0.4 K/UL
MONOCYTES NFR BLD: 10 %
NEUTROPHILS # BLD AUTO: 2.4 K/UL
NEUTROPHILS NFR BLD: 55.2 %
NITRITE UR QL STRIP: NEGATIVE
NRBC BLD-RTO: 0 /100 WBC
PH UR STRIP: 6 [PH] (ref 5–8)
PLATELET # BLD AUTO: 242 K/UL
PMV BLD AUTO: 8.8 FL
POTASSIUM SERPL-SCNC: 3.9 MMOL/L
PROT UR QL STRIP: NEGATIVE
PROTHROMBIN TIME: 10.4 SEC
RBC # BLD AUTO: 3.94 M/UL
SODIUM SERPL-SCNC: 141 MMOL/L
SP GR UR STRIP: 1.02 (ref 1–1.03)
URN SPEC COLLECT METH UR: NORMAL
UROBILINOGEN UR STRIP-ACNC: NEGATIVE EU/DL
WBC # BLD AUTO: 4.31 K/UL

## 2018-09-17 PROCEDURE — 71000039 HC RECOVERY, EACH ADD'L HOUR: Performed by: NEUROLOGICAL SURGERY

## 2018-09-17 PROCEDURE — 94799 UNLISTED PULMONARY SVC/PX: CPT

## 2018-09-17 PROCEDURE — 20600001 HC STEP DOWN PRIVATE ROOM

## 2018-09-17 PROCEDURE — 25000003 PHARM REV CODE 250: Performed by: STUDENT IN AN ORGANIZED HEALTH CARE EDUCATION/TRAINING PROGRAM

## 2018-09-17 PROCEDURE — 01NB0ZZ RELEASE LUMBAR NERVE, OPEN APPROACH: ICD-10-PCS | Performed by: NEUROLOGICAL SURGERY

## 2018-09-17 PROCEDURE — 37000009 HC ANESTHESIA EA ADD 15 MINS: Performed by: NEUROLOGICAL SURGERY

## 2018-09-17 PROCEDURE — C1713 ANCHOR/SCREW BN/BN,TIS/BN: HCPCS | Performed by: NEUROLOGICAL SURGERY

## 2018-09-17 PROCEDURE — 63600175 PHARM REV CODE 636 W HCPCS: Performed by: STUDENT IN AN ORGANIZED HEALTH CARE EDUCATION/TRAINING PROGRAM

## 2018-09-17 PROCEDURE — 63600175 PHARM REV CODE 636 W HCPCS: Performed by: NURSE ANESTHETIST, CERTIFIED REGISTERED

## 2018-09-17 PROCEDURE — 0ST20ZZ RESECTION OF LUMBAR VERTEBRAL DISC, OPEN APPROACH: ICD-10-PCS | Performed by: NEUROLOGICAL SURGERY

## 2018-09-17 PROCEDURE — 20936 SP BONE AGRFT LOCAL ADD-ON: CPT | Mod: ,,, | Performed by: NEUROLOGICAL SURGERY

## 2018-09-17 PROCEDURE — 36000711: Performed by: NEUROLOGICAL SURGERY

## 2018-09-17 PROCEDURE — D9220A PRA ANESTHESIA: Mod: ANES,,, | Performed by: ANESTHESIOLOGY

## 2018-09-17 PROCEDURE — 27201423 OPTIME MED/SURG SUP & DEVICES STERILE SUPPLY: Performed by: NEUROLOGICAL SURGERY

## 2018-09-17 PROCEDURE — 63600175 PHARM REV CODE 636 W HCPCS

## 2018-09-17 PROCEDURE — 20930 SP BONE ALGRFT MORSEL ADD-ON: CPT | Mod: ,,, | Performed by: NEUROLOGICAL SURGERY

## 2018-09-17 PROCEDURE — 0SG10AJ FUSION OF 2 OR MORE LUMBAR VERTEBRAL JOINTS WITH INTERBODY FUSION DEVICE, POSTERIOR APPROACH, ANTERIOR COLUMN, OPEN APPROACH: ICD-10-PCS | Performed by: NEUROLOGICAL SURGERY

## 2018-09-17 PROCEDURE — 22632 ARTHRD PST TQ 1NTRSPC LM EA: CPT | Mod: ,,, | Performed by: NEUROLOGICAL SURGERY

## 2018-09-17 PROCEDURE — 63600175 PHARM REV CODE 636 W HCPCS: Performed by: ANESTHESIOLOGY

## 2018-09-17 PROCEDURE — 86901 BLOOD TYPING SEROLOGIC RH(D): CPT

## 2018-09-17 PROCEDURE — 36000710: Performed by: NEUROLOGICAL SURGERY

## 2018-09-17 PROCEDURE — 85610 PROTHROMBIN TIME: CPT

## 2018-09-17 PROCEDURE — 25000003 PHARM REV CODE 250: Performed by: NEUROLOGICAL SURGERY

## 2018-09-17 PROCEDURE — 85025 COMPLETE CBC W/AUTO DIFF WBC: CPT

## 2018-09-17 PROCEDURE — 80048 BASIC METABOLIC PNL TOTAL CA: CPT

## 2018-09-17 PROCEDURE — 25000003 PHARM REV CODE 250

## 2018-09-17 PROCEDURE — 63600175 PHARM REV CODE 636 W HCPCS: Performed by: NEUROLOGICAL SURGERY

## 2018-09-17 PROCEDURE — 27800903 OPTIME MED/SURG SUP & DEVICES OTHER IMPLANTS: Performed by: NEUROLOGICAL SURGERY

## 2018-09-17 PROCEDURE — 0SG1071 FUSION OF 2 OR MORE LUMBAR VERTEBRAL JOINTS WITH AUTOLOGOUS TISSUE SUBSTITUTE, POSTERIOR APPROACH, POSTERIOR COLUMN, OPEN APPROACH: ICD-10-PCS | Performed by: NEUROLOGICAL SURGERY

## 2018-09-17 PROCEDURE — 22853 INSJ BIOMECHANICAL DEVICE: CPT | Mod: ,,, | Performed by: NEUROLOGICAL SURGERY

## 2018-09-17 PROCEDURE — 81003 URINALYSIS AUTO W/O SCOPE: CPT

## 2018-09-17 PROCEDURE — C1769 GUIDE WIRE: HCPCS | Performed by: NEUROLOGICAL SURGERY

## 2018-09-17 PROCEDURE — D9220A PRA ANESTHESIA: Mod: CRNA,,, | Performed by: NURSE ANESTHETIST, CERTIFIED REGISTERED

## 2018-09-17 PROCEDURE — 22842 INSERT SPINE FIXATION DEVICE: CPT | Mod: ,,, | Performed by: NEUROLOGICAL SURGERY

## 2018-09-17 PROCEDURE — 71000033 HC RECOVERY, INTIAL HOUR: Performed by: NEUROLOGICAL SURGERY

## 2018-09-17 PROCEDURE — 85730 THROMBOPLASTIN TIME PARTIAL: CPT

## 2018-09-17 PROCEDURE — 25000003 PHARM REV CODE 250: Performed by: NURSE ANESTHETIST, CERTIFIED REGISTERED

## 2018-09-17 PROCEDURE — 37000008 HC ANESTHESIA 1ST 15 MINUTES: Performed by: NEUROLOGICAL SURGERY

## 2018-09-17 PROCEDURE — 22630 ARTHRD PST TQ 1NTRSPC LUM: CPT | Mod: ,,, | Performed by: NEUROLOGICAL SURGERY

## 2018-09-17 DEVICE — SPACER SPINAL LEVA 22X10X12MM: Type: IMPLANTABLE DEVICE | Site: BACK | Status: FUNCTIONAL

## 2018-09-17 DEVICE — SCREW SNIPER 6.5MM X 45MM: Type: IMPLANTABLE DEVICE | Site: BACK | Status: FUNCTIONAL

## 2018-09-17 DEVICE — ROD SPINAL 5.5X90 MULTI ANGLE: Type: IMPLANTABLE DEVICE | Site: BACK | Status: FUNCTIONAL

## 2018-09-17 DEVICE — SET SCREW SPINAL LOCKING: Type: IMPLANTABLE DEVICE | Site: BACK | Status: FUNCTIONAL

## 2018-09-17 DEVICE — SCREW BONE SPINAL CANN 7.5X45: Type: IMPLANTABLE DEVICE | Site: BACK | Status: FUNCTIONAL

## 2018-09-17 DEVICE — GWIRE SPINAL BLNT TIP 1.6X19: Type: IMPLANTABLE DEVICE | Site: BACK | Status: FUNCTIONAL

## 2018-09-17 DEVICE — KIT BONE GRFT BMP XS: Type: IMPLANTABLE DEVICE | Site: BACK | Status: FUNCTIONAL

## 2018-09-17 RX ORDER — LIDOCAINE HCL/PF 100 MG/5ML
SYRINGE (ML) INTRAVENOUS
Status: DISCONTINUED | OUTPATIENT
Start: 2018-09-17 | End: 2018-09-17

## 2018-09-17 RX ORDER — ROCURONIUM BROMIDE 10 MG/ML
INJECTION, SOLUTION INTRAVENOUS
Status: DISCONTINUED | OUTPATIENT
Start: 2018-09-17 | End: 2018-09-17

## 2018-09-17 RX ORDER — BUPIVACAINE HYDROCHLORIDE AND EPINEPHRINE 5; 5 MG/ML; UG/ML
INJECTION, SOLUTION EPIDURAL; INTRACAUDAL; PERINEURAL
Status: DISCONTINUED | OUTPATIENT
Start: 2018-09-17 | End: 2018-09-17

## 2018-09-17 RX ORDER — ONDANSETRON 2 MG/ML
4 INJECTION INTRAMUSCULAR; INTRAVENOUS ONCE AS NEEDED
Status: DISCONTINUED | OUTPATIENT
Start: 2018-09-17 | End: 2018-09-17 | Stop reason: HOSPADM

## 2018-09-17 RX ORDER — ONDANSETRON 2 MG/ML
INJECTION INTRAMUSCULAR; INTRAVENOUS
Status: DISCONTINUED | OUTPATIENT
Start: 2018-09-17 | End: 2018-09-17

## 2018-09-17 RX ORDER — LABETALOL HYDROCHLORIDE 5 MG/ML
10 INJECTION, SOLUTION INTRAVENOUS ONCE
Status: DISCONTINUED | OUTPATIENT
Start: 2018-09-17 | End: 2018-09-17

## 2018-09-17 RX ORDER — PHENYLEPHRINE HYDROCHLORIDE 10 MG/ML
INJECTION INTRAVENOUS
Status: DISCONTINUED | OUTPATIENT
Start: 2018-09-17 | End: 2018-09-17

## 2018-09-17 RX ORDER — MEPERIDINE HYDROCHLORIDE 50 MG/ML
12.5 INJECTION INTRAMUSCULAR; INTRAVENOUS; SUBCUTANEOUS ONCE AS NEEDED
Status: DISCONTINUED | OUTPATIENT
Start: 2018-09-17 | End: 2018-09-17 | Stop reason: HOSPADM

## 2018-09-17 RX ORDER — CEFAZOLIN SODIUM 1 G/3ML
2 INJECTION, POWDER, FOR SOLUTION INTRAMUSCULAR; INTRAVENOUS
Status: COMPLETED | OUTPATIENT
Start: 2018-09-17 | End: 2018-09-18

## 2018-09-17 RX ORDER — LABETALOL HYDROCHLORIDE 5 MG/ML
10 INJECTION, SOLUTION INTRAVENOUS ONCE AS NEEDED
Status: DISCONTINUED | OUTPATIENT
Start: 2018-09-17 | End: 2018-09-17

## 2018-09-17 RX ORDER — MUPIROCIN 20 MG/G
1 OINTMENT TOPICAL 2 TIMES DAILY
Status: DISCONTINUED | OUTPATIENT
Start: 2018-09-17 | End: 2018-09-18

## 2018-09-17 RX ORDER — HYDROMORPHONE HYDROCHLORIDE 1 MG/ML
INJECTION, SOLUTION INTRAMUSCULAR; INTRAVENOUS; SUBCUTANEOUS
Status: DISPENSED
Start: 2018-09-17 | End: 2018-09-17

## 2018-09-17 RX ORDER — LABETALOL HYDROCHLORIDE 5 MG/ML
INJECTION, SOLUTION INTRAVENOUS
Status: COMPLETED
Start: 2018-09-17 | End: 2018-09-17

## 2018-09-17 RX ORDER — OXYCODONE AND ACETAMINOPHEN 5; 325 MG/1; MG/1
1 TABLET ORAL EVERY 4 HOURS PRN
Status: DISCONTINUED | OUTPATIENT
Start: 2018-09-17 | End: 2018-09-18

## 2018-09-17 RX ORDER — CEFAZOLIN SODIUM 1 G/3ML
2 INJECTION, POWDER, FOR SOLUTION INTRAMUSCULAR; INTRAVENOUS
Status: COMPLETED | OUTPATIENT
Start: 2018-09-17 | End: 2018-09-17

## 2018-09-17 RX ORDER — FENTANYL CITRATE 50 UG/ML
INJECTION, SOLUTION INTRAMUSCULAR; INTRAVENOUS
Status: DISCONTINUED | OUTPATIENT
Start: 2018-09-17 | End: 2018-09-17

## 2018-09-17 RX ORDER — PROPOFOL 10 MG/ML
VIAL (ML) INTRAVENOUS CONTINUOUS PRN
Status: DISCONTINUED | OUTPATIENT
Start: 2018-09-17 | End: 2018-09-17

## 2018-09-17 RX ORDER — VANCOMYCIN HYDROCHLORIDE 1 G/20ML
INJECTION, POWDER, LYOPHILIZED, FOR SOLUTION INTRAVENOUS
Status: DISCONTINUED | OUTPATIENT
Start: 2018-09-17 | End: 2018-09-17

## 2018-09-17 RX ORDER — HYDROMORPHONE HYDROCHLORIDE 1 MG/ML
INJECTION, SOLUTION INTRAMUSCULAR; INTRAVENOUS; SUBCUTANEOUS
Status: COMPLETED
Start: 2018-09-17 | End: 2018-09-17

## 2018-09-17 RX ORDER — HYDROMORPHONE HYDROCHLORIDE 1 MG/ML
0.2 INJECTION, SOLUTION INTRAMUSCULAR; INTRAVENOUS; SUBCUTANEOUS EVERY 5 MIN PRN
Status: COMPLETED | OUTPATIENT
Start: 2018-09-17 | End: 2018-09-17

## 2018-09-17 RX ORDER — MUPIROCIN 20 MG/G
OINTMENT TOPICAL
Status: DISCONTINUED | OUTPATIENT
Start: 2018-09-17 | End: 2018-09-17

## 2018-09-17 RX ORDER — KETAMINE HYDROCHLORIDE 10 MG/ML
INJECTION, SOLUTION INTRAMUSCULAR; INTRAVENOUS
Status: DISCONTINUED | OUTPATIENT
Start: 2018-09-17 | End: 2018-09-17

## 2018-09-17 RX ORDER — PROPOFOL 10 MG/ML
VIAL (ML) INTRAVENOUS
Status: DISCONTINUED | OUTPATIENT
Start: 2018-09-17 | End: 2018-09-17

## 2018-09-17 RX ORDER — ACETAMINOPHEN 325 MG/1
650 TABLET ORAL EVERY 4 HOURS PRN
Status: DISCONTINUED | OUTPATIENT
Start: 2018-09-17 | End: 2018-09-19

## 2018-09-17 RX ORDER — MIDAZOLAM HYDROCHLORIDE 1 MG/ML
INJECTION, SOLUTION INTRAMUSCULAR; INTRAVENOUS
Status: DISCONTINUED | OUTPATIENT
Start: 2018-09-17 | End: 2018-09-17

## 2018-09-17 RX ORDER — HYDROMORPHONE HYDROCHLORIDE 2 MG/ML
INJECTION, SOLUTION INTRAMUSCULAR; INTRAVENOUS; SUBCUTANEOUS
Status: DISCONTINUED | OUTPATIENT
Start: 2018-09-17 | End: 2018-09-17

## 2018-09-17 RX ORDER — LOSARTAN POTASSIUM 50 MG/1
100 TABLET ORAL NIGHTLY
Status: DISCONTINUED | OUTPATIENT
Start: 2018-09-17 | End: 2018-09-20 | Stop reason: HOSPADM

## 2018-09-17 RX ORDER — TOPIRAMATE 25 MG/1
100 TABLET ORAL 2 TIMES DAILY
Status: DISCONTINUED | OUTPATIENT
Start: 2018-09-17 | End: 2018-09-20 | Stop reason: HOSPADM

## 2018-09-17 RX ORDER — SODIUM CHLORIDE, SODIUM LACTATE, POTASSIUM CHLORIDE, CALCIUM CHLORIDE 600; 310; 30; 20 MG/100ML; MG/100ML; MG/100ML; MG/100ML
INJECTION, SOLUTION INTRAVENOUS CONTINUOUS PRN
Status: DISCONTINUED | OUTPATIENT
Start: 2018-09-17 | End: 2018-09-17

## 2018-09-17 RX ORDER — FENTANYL CITRATE 50 UG/ML
25 INJECTION, SOLUTION INTRAMUSCULAR; INTRAVENOUS EVERY 5 MIN PRN
Status: DISCONTINUED | OUTPATIENT
Start: 2018-09-17 | End: 2018-09-17 | Stop reason: HOSPADM

## 2018-09-17 RX ORDER — LABETALOL HYDROCHLORIDE 5 MG/ML
5 INJECTION, SOLUTION INTRAVENOUS ONCE
Status: COMPLETED | OUTPATIENT
Start: 2018-09-17 | End: 2018-09-17

## 2018-09-17 RX ORDER — ONDANSETRON 8 MG/1
8 TABLET, ORALLY DISINTEGRATING ORAL EVERY 6 HOURS PRN
Status: DISCONTINUED | OUTPATIENT
Start: 2018-09-17 | End: 2018-09-20 | Stop reason: HOSPADM

## 2018-09-17 RX ORDER — BACITRACIN ZINC 500 UNIT/G
OINTMENT (GRAM) TOPICAL
Status: DISCONTINUED | OUTPATIENT
Start: 2018-09-17 | End: 2018-09-17 | Stop reason: HOSPADM

## 2018-09-17 RX ORDER — DIPHENHYDRAMINE HYDROCHLORIDE 50 MG/ML
25 INJECTION INTRAMUSCULAR; INTRAVENOUS EVERY 6 HOURS PRN
Status: DISCONTINUED | OUTPATIENT
Start: 2018-09-17 | End: 2018-09-17 | Stop reason: HOSPADM

## 2018-09-17 RX ORDER — OXYCODONE AND ACETAMINOPHEN 7.5; 325 MG/1; MG/1
1 TABLET ORAL EVERY 4 HOURS PRN
Status: DISCONTINUED | OUTPATIENT
Start: 2018-09-17 | End: 2018-09-18

## 2018-09-17 RX ORDER — AMOXICILLIN 250 MG
2 CAPSULE ORAL NIGHTLY PRN
Status: DISCONTINUED | OUTPATIENT
Start: 2018-09-17 | End: 2018-09-18

## 2018-09-17 RX ORDER — MORPHINE SULFATE 2 MG/ML
0.5 INJECTION, SOLUTION INTRAMUSCULAR; INTRAVENOUS
Status: DISCONTINUED | OUTPATIENT
Start: 2018-09-17 | End: 2018-09-18

## 2018-09-17 RX ORDER — SODIUM CHLORIDE 9 MG/ML
INJECTION, SOLUTION INTRAVENOUS CONTINUOUS PRN
Status: DISCONTINUED | OUTPATIENT
Start: 2018-09-17 | End: 2018-09-17

## 2018-09-17 RX ORDER — SUCCINYLCHOLINE CHLORIDE 20 MG/ML
INJECTION INTRAMUSCULAR; INTRAVENOUS
Status: DISCONTINUED | OUTPATIENT
Start: 2018-09-17 | End: 2018-09-17

## 2018-09-17 RX ORDER — OXYCODONE AND ACETAMINOPHEN 7.5; 325 MG/1; MG/1
TABLET ORAL
Status: COMPLETED
Start: 2018-09-17 | End: 2018-09-17

## 2018-09-17 RX ORDER — HYDRALAZINE HYDROCHLORIDE 20 MG/ML
10 INJECTION INTRAMUSCULAR; INTRAVENOUS ONCE AS NEEDED
Status: COMPLETED | OUTPATIENT
Start: 2018-09-17 | End: 2018-09-17

## 2018-09-17 RX ORDER — LABETALOL HYDROCHLORIDE 5 MG/ML
20 INJECTION, SOLUTION INTRAVENOUS ONCE
Status: DISCONTINUED | OUTPATIENT
Start: 2018-09-17 | End: 2018-09-17

## 2018-09-17 RX ORDER — BACITRACIN 50000 [IU]/1
INJECTION, POWDER, FOR SOLUTION INTRAMUSCULAR
Status: DISCONTINUED | OUTPATIENT
Start: 2018-09-17 | End: 2018-09-17

## 2018-09-17 RX ORDER — DEXAMETHASONE SODIUM PHOSPHATE 4 MG/ML
INJECTION, SOLUTION INTRA-ARTICULAR; INTRALESIONAL; INTRAMUSCULAR; INTRAVENOUS; SOFT TISSUE
Status: DISCONTINUED | OUTPATIENT
Start: 2018-09-17 | End: 2018-09-17

## 2018-09-17 RX ORDER — BISACODYL 10 MG
10 SUPPOSITORY, RECTAL RECTAL DAILY
Status: DISCONTINUED | OUTPATIENT
Start: 2018-09-17 | End: 2018-09-18

## 2018-09-17 RX ORDER — MAG HYDROX/ALUMINUM HYD/SIMETH 200-200-20
30 SUSPENSION, ORAL (FINAL DOSE FORM) ORAL EVERY 4 HOURS PRN
Status: DISCONTINUED | OUTPATIENT
Start: 2018-09-17 | End: 2018-09-20 | Stop reason: HOSPADM

## 2018-09-17 RX ORDER — FUROSEMIDE 20 MG/1
20 TABLET ORAL NIGHTLY
Status: DISCONTINUED | OUTPATIENT
Start: 2018-09-17 | End: 2018-09-20 | Stop reason: HOSPADM

## 2018-09-17 RX ORDER — HYDRALAZINE HYDROCHLORIDE 20 MG/ML
10 INJECTION INTRAMUSCULAR; INTRAVENOUS ONCE
Status: DISCONTINUED | OUTPATIENT
Start: 2018-09-17 | End: 2018-09-17

## 2018-09-17 RX ADMIN — LOSARTAN POTASSIUM 100 MG: 50 TABLET ORAL at 08:09

## 2018-09-17 RX ADMIN — ROCURONIUM BROMIDE 5 MG: 10 INJECTION, SOLUTION INTRAVENOUS at 07:09

## 2018-09-17 RX ADMIN — PHENYLEPHRINE HYDROCHLORIDE 100 MCG: 10 INJECTION INTRAVENOUS at 08:09

## 2018-09-17 RX ADMIN — SODIUM CHLORIDE, SODIUM LACTATE, POTASSIUM CHLORIDE, AND CALCIUM CHLORIDE: 600; 310; 30; 20 INJECTION, SOLUTION INTRAVENOUS at 11:09

## 2018-09-17 RX ADMIN — FENTANYL CITRATE 50 MCG: 50 INJECTION, SOLUTION INTRAMUSCULAR; INTRAVENOUS at 09:09

## 2018-09-17 RX ADMIN — FENTANYL CITRATE 50 MCG: 50 INJECTION, SOLUTION INTRAMUSCULAR; INTRAVENOUS at 11:09

## 2018-09-17 RX ADMIN — KETAMINE HYDROCHLORIDE 30 MG: 10 INJECTION, SOLUTION INTRAMUSCULAR; INTRAVENOUS at 07:09

## 2018-09-17 RX ADMIN — MUPIROCIN 1 G: 20 OINTMENT TOPICAL at 08:09

## 2018-09-17 RX ADMIN — FENTANYL CITRATE 50 MCG: 50 INJECTION, SOLUTION INTRAMUSCULAR; INTRAVENOUS at 10:09

## 2018-09-17 RX ADMIN — CEFAZOLIN 2 G: 330 INJECTION, POWDER, FOR SOLUTION INTRAMUSCULAR; INTRAVENOUS at 08:09

## 2018-09-17 RX ADMIN — MIDAZOLAM HYDROCHLORIDE 1 MG: 1 INJECTION, SOLUTION INTRAMUSCULAR; INTRAVENOUS at 07:09

## 2018-09-17 RX ADMIN — SODIUM CHLORIDE: 0.9 INJECTION, SOLUTION INTRAVENOUS at 07:09

## 2018-09-17 RX ADMIN — LIDOCAINE HYDROCHLORIDE 75 MG: 20 INJECTION, SOLUTION INTRAVENOUS at 07:09

## 2018-09-17 RX ADMIN — PROPOFOL 150 MG: 10 INJECTION, EMULSION INTRAVENOUS at 07:09

## 2018-09-17 RX ADMIN — Medication 0.2 MG: at 01:09

## 2018-09-17 RX ADMIN — CEFAZOLIN 2 G: 330 INJECTION, POWDER, FOR SOLUTION INTRAMUSCULAR; INTRAVENOUS at 07:09

## 2018-09-17 RX ADMIN — Medication 0.2 MG: at 02:09

## 2018-09-17 RX ADMIN — LABETALOL HYDROCHLORIDE 5 MG: 5 INJECTION, SOLUTION INTRAVENOUS at 01:09

## 2018-09-17 RX ADMIN — CEFAZOLIN 2 G: 330 INJECTION, POWDER, FOR SOLUTION INTRAMUSCULAR; INTRAVENOUS at 11:09

## 2018-09-17 RX ADMIN — DEXAMETHASONE SODIUM PHOSPHATE 12 MG: 4 INJECTION, SOLUTION INTRAMUSCULAR; INTRAVENOUS at 07:09

## 2018-09-17 RX ADMIN — Medication 0.2 MG: at 12:09

## 2018-09-17 RX ADMIN — FENTANYL CITRATE 100 MCG: 50 INJECTION, SOLUTION INTRAMUSCULAR; INTRAVENOUS at 07:09

## 2018-09-17 RX ADMIN — PROPOFOL 150 MCG/KG/MIN: 10 INJECTION, EMULSION INTRAVENOUS at 07:09

## 2018-09-17 RX ADMIN — PROPOFOL 50 MG: 10 INJECTION, EMULSION INTRAVENOUS at 07:09

## 2018-09-17 RX ADMIN — PHENYLEPHRINE HYDROCHLORIDE 100 MCG: 10 INJECTION INTRAVENOUS at 09:09

## 2018-09-17 RX ADMIN — SODIUM CHLORIDE, SODIUM GLUCONATE, SODIUM ACETATE, POTASSIUM CHLORIDE, MAGNESIUM CHLORIDE, SODIUM PHOSPHATE, DIBASIC, AND POTASSIUM PHOSPHATE: .53; .5; .37; .037; .03; .012; .00082 INJECTION, SOLUTION INTRAVENOUS at 07:09

## 2018-09-17 RX ADMIN — FUROSEMIDE 20 MG: 20 TABLET ORAL at 08:09

## 2018-09-17 RX ADMIN — TOPIRAMATE 100 MG: 25 TABLET, FILM COATED ORAL at 08:09

## 2018-09-17 RX ADMIN — OXYCODONE AND ACETAMINOPHEN 1 TABLET: 7.5; 325 TABLET ORAL at 09:09

## 2018-09-17 RX ADMIN — HYDROMORPHONE HYDROCHLORIDE 0.5 MG: 2 INJECTION, SOLUTION INTRAMUSCULAR; INTRAVENOUS; SUBCUTANEOUS at 11:09

## 2018-09-17 RX ADMIN — HYDRALAZINE HYDROCHLORIDE 10 MG: 20 INJECTION INTRAMUSCULAR; INTRAVENOUS at 02:09

## 2018-09-17 RX ADMIN — ONDANSETRON 4 MG: 2 INJECTION INTRAMUSCULAR; INTRAVENOUS at 11:09

## 2018-09-17 RX ADMIN — OXYCODONE AND ACETAMINOPHEN 1 TABLET: 7.5; 325 TABLET ORAL at 12:09

## 2018-09-17 RX ADMIN — KETAMINE HYDROCHLORIDE 10 MG: 10 INJECTION, SOLUTION INTRAMUSCULAR; INTRAVENOUS at 09:09

## 2018-09-17 RX ADMIN — SUCCINYLCHOLINE CHLORIDE 120 MG: 20 INJECTION, SOLUTION INTRAMUSCULAR; INTRAVENOUS at 07:09

## 2018-09-17 RX ADMIN — KETAMINE HYDROCHLORIDE 10 MG: 10 INJECTION, SOLUTION INTRAMUSCULAR; INTRAVENOUS at 10:09

## 2018-09-17 RX ADMIN — REMIFENTANIL HYDROCHLORIDE 0.2 MCG/KG/MIN: 1 INJECTION, POWDER, LYOPHILIZED, FOR SOLUTION INTRAVENOUS at 07:09

## 2018-09-17 RX ADMIN — OXYCODONE AND ACETAMINOPHEN 1 TABLET: 7.5; 325 TABLET ORAL at 05:09

## 2018-09-17 RX ADMIN — ACETAMINOPHEN 650 MG: 325 TABLET ORAL at 08:09

## 2018-09-17 NOTE — PLAN OF CARE
VSS. Patient states pain is tolerable. No N&V. SCD's in place throughout duration in PACU. LSO brace in place at time of transfer. Transferred to the next Phase of Care.

## 2018-09-17 NOTE — INTERVAL H&P NOTE
The patient has been examined and the H&P has been reviewed:    I concur with the findings and no changes have occurred since H&P was written.    Anesthesia/Surgery risks, benefits and alternative options discussed and understood by patient/family.          Active Hospital Problems    Diagnosis  POA    Spondylolisthesis, lumbar region [M43.16]  Yes      Resolved Hospital Problems   No resolved problems to display.

## 2018-09-17 NOTE — TRANSFER OF CARE
"Anesthesia Transfer of Care Note    Patient: Rosa Fermin    Procedure(s) Performed: Procedure(s) (LRB):  FUSION, SPINE, LUMBAR, TLIF, MINIMALLY INVASIVE @ L4-L5 & L5-S1 (N/A)    Patient location: PACU    Anesthesia Type: general    Transport from OR: Transported from OR on 6-10 L/min O2 by face mask with adequate spontaneous ventilation    Post pain: adequate analgesia    Post assessment: no apparent anesthetic complications and tolerated procedure well    Post vital signs: stable    Level of consciousness: sedated and responds to stimulation    Nausea/Vomiting: no nausea/vomiting    Complications: none    Transfer of care protocol was followed      Last vitals:   Visit Vitals  BP (!) 184/108 (BP Location: Right arm, Patient Position: Lying)   Pulse 93   Temp 36.1 °C (96.9 °F) (Axillary)   Resp 20   Ht 5' 3" (1.6 m)   Wt 108.9 kg (240 lb)   SpO2 99%   Breastfeeding? No   BMI 42.51 kg/m²     "

## 2018-09-17 NOTE — ANESTHESIA POSTPROCEDURE EVALUATION
"Anesthesia Post Evaluation    Patient: Rosa Fermin    Procedure(s) Performed: Procedure(s) (LRB):  FUSION, SPINE, LUMBAR, TLIF, MINIMALLY INVASIVE @ L4-L5 & L5-S1 (N/A)    Final Anesthesia Type: general  Patient location during evaluation: PACU  Patient participation: Yes- Able to Participate  Level of consciousness: awake and alert and oriented  Post-procedure vital signs: reviewed and stable  Pain management: adequate  Airway patency: patent  PONV status at discharge: No PONV  Anesthetic complications: yes  Perioperative Events: other periop events (see comments)  Ledy-operative Events Comments: HTN post-op SBP 200s, treated prompltly, improved w/ 25mg labetalol and 10mg hydralazine  Cardiovascular status: blood pressure returned to baseline  Respiratory status: unassisted and room air  Hydration status: euvolemic  Follow-up not needed.        Visit Vitals  BP (!) 157/70   Pulse 103   Temp 36.1 °C (96.9 °F) (Axillary)   Resp (!) 22   Ht 5' 3" (1.6 m)   Wt 108.9 kg (240 lb)   SpO2 100%   Breastfeeding? No   BMI 42.51 kg/m²       Pain/Stephanie Score: Pain Assessment Performed: Yes (9/17/2018 12:30 PM)  Presence of Pain: complains of pain/discomfort (9/17/2018 12:40 PM)  Pain Rating Prior to Med Admin: 4 (9/17/2018  2:30 PM)  Stephanie Score: 8 (9/17/2018 12:30 PM)        "

## 2018-09-17 NOTE — ANESTHESIA RELEASE NOTE
"Anesthesia Release from PACU Note    Patient: Rosa Fermin    Procedure(s) Performed: Procedure(s) (LRB):  FUSION, SPINE, LUMBAR, TLIF, MINIMALLY INVASIVE @ L4-L5 & L5-S1 (N/A)    Anesthesia type: general    Post pain: Adequate analgesia    Post assessment: no apparent anesthetic complications    Last Vitals:   Visit Vitals  BP (!) 157/70   Pulse 103   Temp 36.1 °C (96.9 °F) (Axillary)   Resp (!) 22   Ht 5' 3" (1.6 m)   Wt 108.9 kg (240 lb)   SpO2 100%   Breastfeeding? No   BMI 42.51 kg/m²       Post vital signs: stable    Level of consciousness: awake, alert  and oriented    Nausea/Vomiting: no nausea/no vomiting    Complications: none    Airway Patency: patent    Respiratory: unassisted, spontaneous ventilation    Cardiovascular: stable    Hydration: euvolemic  "

## 2018-09-17 NOTE — CARE UPDATE
Pagedeandra ARIAS, pt has bilat leg swelling, left leg is red and warm and pt states had CT performed that was negative for DVT. PA sates will let resident on case know.

## 2018-09-17 NOTE — NURSING TRANSFER
Nursing Transfer Note      9/17/2018     Transfer To: ay -> [id number]    Transfer via stretcher    Transfer with cardiac monitoring    Transported by escort    Medicines sent: N/A    Chart send with patient: Yes    Notified: daughter    Patient reassessed at: 9/17/18 @ 2987

## 2018-09-18 PROCEDURE — 87040 BLOOD CULTURE FOR BACTERIA: CPT

## 2018-09-18 PROCEDURE — 99024 POSTOP FOLLOW-UP VISIT: CPT | Mod: ,,, | Performed by: PHYSICIAN ASSISTANT

## 2018-09-18 PROCEDURE — 87077 CULTURE AEROBIC IDENTIFY: CPT

## 2018-09-18 PROCEDURE — 97161 PT EVAL LOW COMPLEX 20 MIN: CPT

## 2018-09-18 PROCEDURE — 63600175 PHARM REV CODE 636 W HCPCS: Performed by: PHYSICIAN ASSISTANT

## 2018-09-18 PROCEDURE — 36415 COLL VENOUS BLD VENIPUNCTURE: CPT

## 2018-09-18 PROCEDURE — 87186 SC STD MICRODIL/AGAR DIL: CPT

## 2018-09-18 PROCEDURE — 94761 N-INVAS EAR/PLS OXIMETRY MLT: CPT

## 2018-09-18 PROCEDURE — 87088 URINE BACTERIA CULTURE: CPT

## 2018-09-18 PROCEDURE — 25000242 PHARM REV CODE 250 ALT 637 W/ HCPCS: Performed by: PHYSICIAN ASSISTANT

## 2018-09-18 PROCEDURE — 25000003 PHARM REV CODE 250: Performed by: PHYSICIAN ASSISTANT

## 2018-09-18 PROCEDURE — 20600001 HC STEP DOWN PRIVATE ROOM

## 2018-09-18 PROCEDURE — 25000003 PHARM REV CODE 250: Performed by: STUDENT IN AN ORGANIZED HEALTH CARE EDUCATION/TRAINING PROGRAM

## 2018-09-18 PROCEDURE — 63600175 PHARM REV CODE 636 W HCPCS: Performed by: STUDENT IN AN ORGANIZED HEALTH CARE EDUCATION/TRAINING PROGRAM

## 2018-09-18 PROCEDURE — 94640 AIRWAY INHALATION TREATMENT: CPT

## 2018-09-18 PROCEDURE — 87086 URINE CULTURE/COLONY COUNT: CPT

## 2018-09-18 PROCEDURE — 99900035 HC TECH TIME PER 15 MIN (STAT)

## 2018-09-18 PROCEDURE — 97530 THERAPEUTIC ACTIVITIES: CPT

## 2018-09-18 RX ORDER — HEPARIN SODIUM 5000 [USP'U]/ML
5000 INJECTION, SOLUTION INTRAVENOUS; SUBCUTANEOUS EVERY 8 HOURS
Status: DISCONTINUED | OUTPATIENT
Start: 2018-09-19 | End: 2018-09-20 | Stop reason: HOSPADM

## 2018-09-18 RX ORDER — BACITRACIN 500 [USP'U]/G
OINTMENT TOPICAL 2 TIMES DAILY
Status: DISCONTINUED | OUTPATIENT
Start: 2018-09-18 | End: 2018-09-20 | Stop reason: HOSPADM

## 2018-09-18 RX ORDER — IPRATROPIUM BROMIDE AND ALBUTEROL SULFATE 2.5; .5 MG/3ML; MG/3ML
3 SOLUTION RESPIRATORY (INHALATION)
Status: DISCONTINUED | OUTPATIENT
Start: 2018-09-18 | End: 2018-09-19

## 2018-09-18 RX ORDER — METOPROLOL TARTRATE 25 MG/1
25 TABLET, FILM COATED ORAL 2 TIMES DAILY
Status: DISCONTINUED | OUTPATIENT
Start: 2018-09-18 | End: 2018-09-20 | Stop reason: HOSPADM

## 2018-09-18 RX ORDER — AMOXICILLIN 250 MG
1 CAPSULE ORAL 2 TIMES DAILY
Status: DISCONTINUED | OUTPATIENT
Start: 2018-09-18 | End: 2018-09-20 | Stop reason: HOSPADM

## 2018-09-18 RX ORDER — BISACODYL 10 MG
10 SUPPOSITORY, RECTAL RECTAL DAILY PRN
Status: DISCONTINUED | OUTPATIENT
Start: 2018-09-18 | End: 2018-09-20 | Stop reason: HOSPADM

## 2018-09-18 RX ORDER — MORPHINE SULFATE 2 MG/ML
0.5 INJECTION, SOLUTION INTRAMUSCULAR; INTRAVENOUS
Status: DISCONTINUED | OUTPATIENT
Start: 2018-09-18 | End: 2018-09-20 | Stop reason: HOSPADM

## 2018-09-18 RX ORDER — OXYCODONE AND ACETAMINOPHEN 7.5; 325 MG/1; MG/1
1 TABLET ORAL EVERY 4 HOURS PRN
Status: DISCONTINUED | OUTPATIENT
Start: 2018-09-18 | End: 2018-09-20 | Stop reason: HOSPADM

## 2018-09-18 RX ORDER — POLYETHYLENE GLYCOL 3350 17 G/17G
17 POWDER, FOR SOLUTION ORAL DAILY
Status: DISCONTINUED | OUTPATIENT
Start: 2018-09-18 | End: 2018-09-20 | Stop reason: HOSPADM

## 2018-09-18 RX ADMIN — LOSARTAN POTASSIUM 100 MG: 50 TABLET ORAL at 08:09

## 2018-09-18 RX ADMIN — TOPIRAMATE 100 MG: 25 TABLET, FILM COATED ORAL at 09:09

## 2018-09-18 RX ADMIN — IPRATROPIUM BROMIDE AND ALBUTEROL SULFATE 3 ML: .5; 3 SOLUTION RESPIRATORY (INHALATION) at 01:09

## 2018-09-18 RX ADMIN — ACETAMINOPHEN 650 MG: 325 TABLET ORAL at 12:09

## 2018-09-18 RX ADMIN — DOCUSATE SODIUM -SENNOSIDES 1 TABLET: 50; 8.6 TABLET, COATED ORAL at 08:09

## 2018-09-18 RX ADMIN — ACETAMINOPHEN 650 MG: 325 TABLET ORAL at 05:09

## 2018-09-18 RX ADMIN — Medication 0.5 MG: at 11:09

## 2018-09-18 RX ADMIN — Medication 0.5 MG: at 07:09

## 2018-09-18 RX ADMIN — CEFAZOLIN 2 G: 330 INJECTION, POWDER, FOR SOLUTION INTRAMUSCULAR; INTRAVENOUS at 04:09

## 2018-09-18 RX ADMIN — METOPROLOL TARTRATE 25 MG: 25 TABLET ORAL at 08:09

## 2018-09-18 RX ADMIN — FUROSEMIDE 20 MG: 20 TABLET ORAL at 08:09

## 2018-09-18 RX ADMIN — ACETAMINOPHEN 650 MG: 325 TABLET ORAL at 06:09

## 2018-09-18 RX ADMIN — TOPIRAMATE 100 MG: 25 TABLET, FILM COATED ORAL at 08:09

## 2018-09-18 RX ADMIN — DOCUSATE SODIUM -SENNOSIDES 1 TABLET: 50; 8.6 TABLET, COATED ORAL at 10:09

## 2018-09-18 RX ADMIN — IPRATROPIUM BROMIDE AND ALBUTEROL SULFATE 3 ML: .5; 3 SOLUTION RESPIRATORY (INHALATION) at 04:09

## 2018-09-18 RX ADMIN — BACITRACIN: 500 OINTMENT TOPICAL at 09:09

## 2018-09-18 RX ADMIN — POLYETHYLENE GLYCOL 3350 17 G: 17 POWDER, FOR SOLUTION ORAL at 10:09

## 2018-09-18 RX ADMIN — OXYCODONE AND ACETAMINOPHEN 1 TABLET: 7.5; 325 TABLET ORAL at 01:09

## 2018-09-18 RX ADMIN — Medication 0.5 MG: at 03:09

## 2018-09-18 RX ADMIN — OXYCODONE AND ACETAMINOPHEN 1 TABLET: 7.5; 325 TABLET ORAL at 05:09

## 2018-09-18 NOTE — HOSPITAL COURSE
9/17: OR for minimally invasive lumbar fusion at L4/5 and L5/S1. Transferred to PACU post-op. Stepped down to floor last night. Unable to obtain postop XR secondary to pain.  9/18: overnight febrile one time at 101.6. Pt denied distress. Temp WNL after second dose Tylenol. Tachycardia ~114-120. -170s. Xray today.   9/19: febrile this am at 101.5. Tachycardia 127. SBP 140s. Post op xray done today.   9/20: febrile overnight once at 100.9. Pulse . No acute distress. Improved weakness and minimal low back pain.

## 2018-09-18 NOTE — OP NOTE
DATE OF PROCEDURE:  09/17/2018    SURGEON:  Noel Bernal M.D., Ph.D.    ASSISTANT:  Dr. Consuelo Ochoa (the assistant is a [facility]   resident).    PREOPERATIVE DIAGNOSES:  1.  Lumbar spondylosis.  2.  Lumbar spondylolist  1.  Atypical chest pain.  2.  History of DVT.  3.  Bilateral lower extremity swelling.    POSTOPERATIVE DIAGNOSES:  1.  Atypical chest pain.  2.  History of DVT.  3.  Bilateral lower extremity swelling.    PROCEDURES PERFORMED:  1.  Minimally invasive posterior approach on the left.  2.  Left-sided L3-L4 laminectomy, facetectomy and diskectomy.  3.  Placement of expandable cage at L3-L4.  4.  Placement of autograft and allograft with associated fusion at L3-L4.  5.  L4-L5 left-sided laminectomy, facetectomy and diskectomy.  6.  Placement of expandable cage at L4-L5.  7.  Placement of autograft and allograft at L4-L5 with associated anterior   fusion.  8.  Placement of percutaneous pedicle screws at L3 and S1 with associated   posterior fixation.  9.  Neuromonitoring with SSEPs, MEPs and EMG.    ESTIMATED BLOOD LOSS:  Approximately 200 mL.    SPECIMEN:  None.    INDICATIONS IN DETAIL:  Ms. Rosa Fermin is a very pleasant 63-year-old woman   with a history of chronic low back pain.  The patient also has a history of leg   weakness.  The patient was initially seen by Dr. Tapia and had a workup,   which included an MRI and later a CT that showed lumbar spondylosis and   spondylolisthesis that is associated with her leg pain.  The patient also had   evidence of broad-based herniated disk at L4-L5 with bilateral foraminal   encroachment.  The patient was seen by Dr. Bernal and after the risks, benefits   and alternatives were described, she wished to proceed with an L3 through S1   fusion.    PROCEDURE IN DETAIL:  The patient was seen in the pretreatment area and the   risks, benefits and alternatives were again discussed.  The patient wished to   proceed.  The patient was brought  to the Operating Room and a general anesthetic   was administered.  All proper lines were placed.  The patient was turned into   the prone position on a Ahmet table.  All pressure points were padded.  The   patient's back was cleaned, prepped and draped in the usual fashion.  The   fluoroscopy again was brought to the field and the L3-L4 level was visualized.    A 2.5 cm incision was made at the shadow of the facet on the left side.  An   incision was made through the skin and down through the fascia.  A minimally   invasive approach was then made to the junction of the lamina and the facet   using the METRx set.  Soft tissue was removed from the lamina and the facet   using Bovie cautery.  Then, using a high-speed Ouner drill with a matchstick   bur, we performed a complete facetectomy on the left as well as laminectomy.    The bone dust was collected using the Hensler bone press for autograft.  The   ligamentum flavum was removed and the neural elements could clearly be seen.    The thecal sac and the nerve root were medialized.  The annulus of the disk was   opened and diskectomy was performed using curettes and rongeurs in the usual   fashion.  Once this was performed, the endplates were prepared for fusion using   disk carlos.  An expandable cage was then placed in the L3-L4 interspace under   direct visualization and fluoroscopic guidance.  Once it was in the interspace,   the cage was deployed.  We placed BMP as well as bone from the Hensler bone   press in this level for fusion.  Once this was complete, the wound was irrigated   copiously.  All bleeding points were coagulated and the retractor was removed.    We performed the same maneuver at the L4-L5 level.  The retractor was placed in   the same fashion.  We performed the laminectomy, facetectomy and diskectomy in   the same fashion.  The expandable cage was placed at that level also.  Once the   cages were placed, we turned our attention to place  percutaneous pedicle screws.    We placed percutaneous pedicle screws at L3 and at the S1 level.  The patient   has had an auto-fusion of L5 and S1.  This will provide the patient with   additional rigidity for her anterior fusion.  The wound was irrigated copiously.    All bleeding points were coagulated.  The wound was then closed in layers with   staples in the skin.  The patient was placed in a back brace.  The patient was   awakened by the Anesthesia staff.  At the point the patient was awake and   following commands, she was extubated.    There were no intraprocedural complications.    All counts were correct at the end of surgery.    We performed stimulation of the pedicles by stimulating the Jamshidi needles.      CAIO  dd: 09/18/2018 09:09:53 (CDT)  td: 09/18/2018 16:26:31 (CDT)  Doc ID   #[id number]  Job ID #[id number]    CC:

## 2018-09-18 NOTE — HPI
The patient is a 63 y.o. female who was referred to me by Dr. Tapia. The pt complains of acute on chronic low back pain.  She has a long hx of back pain and was followed by an outside facility for her back pain for many years which was well-maintained with medication and TODD's. However, her back pain has been progressing since December but has significantly worsened within the last two months. She notes pain in her bilateral lower pain with intermittent radiation into the RLE. She states the pain has limited her physical activities and is exacerbated when sitting or laying supine. She has not been able to sleep on her bed because the pain is intolerable and is now sleeping on her couch. She rates the pain a 8-9/10 without pain medication and 3/10 with medication. She endorses BLE weakness which is worse on the left, secondary to back pain and chronic knee pain. She has a hx of left knee surgery. She works as a  in the setting of outpatient clinics and hospitals. The pt reports no hx of cigarette use. Pt has a hx of HTN but no cardiac hx.

## 2018-09-18 NOTE — NURSING
Patient AAOx4. Patient febrile. Awaiting call back from MD for ok to give patient tylenol due to exceeding rec dose. Up to bedside commode with assist. Plan of care reviewed with patient. Brace when OOB. Patient remains free of falls. Will continue to monitor patient.

## 2018-09-18 NOTE — PLAN OF CARE
CM met with patient this am to obtain discharge planning assessment.  Patient is POD 1 spinal fusion, minimally invasive.  Planned discharge is home with family - Plan (A) or home with family and home health - Plan (B).    PCP:  Chery Lopez MD    Payor:  Payor: BLUE CROSS [facility] EMPLOYEE BENEFIT / Plan: BLUE CROSS [employer] EMPLOYEE / Product Type: Self Funded /      Pharmacy:    [facility]  [facility] [place] - [address]  [address]  [place] [address]  Phone: [phone] Fax: [phone]    [facility], [place] - [address]  [address] [facility] [place] [address]  Phone: [phone] Fax: [phone]    [facility] - [place], [place] - [address] [facility]  [address] [place] [address]  Phone: [phone] Fax: [phone]       09/18/18 1415   Discharge Assessment   Assessment Type Discharge Planning Assessment   Confirmed/corrected address and phone number on facesheet? Yes   Assessment information obtained from? Patient   Expected Length of Stay (days) 2   Communicated expected length of stay with patient/caregiver yes   Prior to hospitilization cognitive status: Alert/Oriented   Prior to hospitalization functional status: Assistive Equipment   Current cognitive status: Alert/Oriented   Current Functional Status: Assistive Equipment   Lives With spouse   Able to Return to Prior Arrangements yes   Is patient able to care for self after discharge? Unable to determine at this time (comments)   Patient's perception of discharge disposition home or selfcare   Readmission Within The Last 30 Days planned readmission   Patient currently being followed by outpatient case management? No   Patient currently receives any other outside agency services? No   Equipment Currently Used at Home cane, straight;rollator   Do you have any problems affording any of your prescribed medications? No   Is the patient taking medications as prescribed? yes   Does the  patient have transportation home? Yes   Transportation Available family or friend will provide   Does the patient receive services at the [facility]? No   Discharge Plan A Home with family   Discharge Plan B Home with family;Home Health   Patient/Family In Agreement With Plan yes   Readmission Questionnaire   At the time of your discharge, did someone talk to you about what your health problems were? Yes   At the time of discharge, did someone talk to you about what to watch out for regarding worsening of your health problem? Yes   At the time of discharge, did someone talk to you about what to do if you experienced worsening of your health problem? Yes   At the time of discharge, did someone talk to you about which medication to take when you left the hospital and which ones to stop taking? Yes   At the time of discharge, did someone talk to you about when and where to follow up with a doctor after you left the hospital? Yes   What do you believe caused you to be sick enough to be re-admitted? surgery   How often do you need to have someone help you when you read instructions, pamphlets, or other written material from your doctor or pharmacy? Sometimes   Do you have problems taking your medications as prescribed? No   Do you have any problems affording any of  your prescribed medications? No   Do you have problems obtaining/receiving your medications? No   Does the patient have transportation to healthcare appointments? Yes   Living Arrangements house   Does the patient have family/friends to help with healtcare needs after discharge? yes   Does your caregiver provide all the help you need? Yes   Are you currently feeling confused? No   Are you currently having problems thinking? No   Are you currently having memory problems? No   Have you felt down, depressed, or hopeless? 0   Have you felt little interest or pleasure in doing things? 0   In the last 7 days, my sleep quality was: fair

## 2018-09-18 NOTE — PROGRESS NOTES
[facility]  Neurosurgery  Progress Note    Subjective:     History of Present Illness: The patient is a 63 y.o. female who was referred to me by Dr. Tapia. The pt complains of acute on chronic low back pain.  She has a long hx of back pain and was followed by an outside facility for her back pain for many years which was well-maintained with medication and TODD's. However, her back pain has been progressing since December but has significantly worsened within the last two months. She notes pain in her bilateral lower pain with intermittent radiation into the RLE. She states the pain has limited her physical activities and is exacerbated when sitting or laying supine. She has not been able to sleep on her bed because the pain is intolerable and is now sleeping on her couch. She rates the pain a 8-9/10 without pain medication and 3/10 with medication. She endorses BLE weakness which is worse on the left, secondary to back pain and chronic knee pain. She has a hx of left knee surgery. She works as a  in the setting of outpatient clinics and hospitals. The pt reports no hx of cigarette use. Pt has a hx of HTN but no cardiac hx.        Post-Op Info:  Procedure(s) (LRB):  FUSION, SPINE, LUMBAR, TLIF, MINIMALLY INVASIVE @ L4-L5 & L5-S1 (N/A)   1 Day Post-Op     Interval History: Overnight pt spike a fever of 101.6. Pt denied distress at that time, per nursing note. After two doses of Tylenol, temp WNL. Pt with tachycardia ~114-120. -170s. Unable to obtain post op XR last night secondary to pain. Reports LE pain is controlled with medication. Denies increased LE weakness, numbness, paresthesias. Xray done today. Quarles removed this morning, pt voided without difficulty after.       Medications:  Continuous Infusions:  Scheduled Meds:   albuterol-ipratropium  3 mL Nebulization Q4H WAKE    bisacodyl  10 mg Rectal Daily    furosemide  20 mg Oral QHS    [START ON 9/19/2018] heparin  (porcine)  5,000 Units Subcutaneous Q8H    losartan  100 mg Oral QHS    polyethylene glycol  17 g Oral Daily    senna-docusate 8.6-50 mg  1 tablet Oral BID    topiramate  100 mg Oral BID     PRN Meds:acetaminophen, aluminum-magnesium hydroxide-simethicone, morphine, ondansetron, oxyCODONE-acetaminophen, promethazine (PHENERGAN) IVPB       Objective:     Weight: 113.8 kg (250 lb 14.1 oz)  Body mass index is 44.44 kg/m².  Vital Signs (Most Recent):  Temp: 99.2 °F (37.3 °C) (09/18/18 1147)  Pulse: (!) 114 (09/18/18 1347)  Resp: 18 (09/18/18 1347)  BP: (!) 156/72 (09/18/18 1147)  SpO2: 98 % (09/18/18 1347) Vital Signs (24h Range):  Temp:  [98.2 °F (36.8 °C)-101.6 °F (38.7 °C)] 99.2 °F (37.3 °C)  Pulse:  [] 114  Resp:  [15-28] 18  SpO2:  [94 %-100 %] 98 %  BP: (137-184)/(63-86) 156/72     Date 09/18/18 0700 - 09/19/18 0659   Shift 0867-5575 7518-7817 5227-1753 24 Hour Total   INTAKE   Shift Total(mL/kg)       OUTPUT   Urine(mL/kg/hr) 350   350   Shift Total(mL/kg) 350(3.1)   350(3.1)   Weight (kg) 113.8 113.8 113.8 113.8                   Neurosurgery Physical Exam    General: well developed, well nourished, no distress.   Head: normocephalic, atraumatic  Neurologic: Alert and oriented. Thought content appropriate.  GCS: Motor: 6/Verbal: 5/Eyes: 4 GCS Total: 15  Mental Status: Awake, Alert, Oriented x 4  Cranial nerves: CN II-XII grossly intact.   Eyes: pupils equal, round, reactive to light with accomodation, EOMI.  Pulmonary: no signs of respiratory distress  Abdomen: not tender to palpation  Sensory: decreased sensation to lateral LLE   Motor Strength: Moves all extremities spontaneously with good tone. Full strength bilateral upper extremities.  4+/5 strength to RLE, LLE. No abnormal movements seen.   Hallman: absent  Clonus: absent  Babinski: absent  Incision: c/d/i with staples in place. No surrounding erythema or discharge noted.     Significant Labs:  Recent Labs   Lab  09/17/18   0533   GLU  93   NA   141   K  3.9   CL  113*   CO2  17*   BUN  14   CREATININE  0.7   CALCIUM  9.5     Recent Labs   Lab  09/17/18   0533   WBC  4.31   HGB  10.6*   HCT  33.7*   PLT  242     Recent Labs   Lab  09/17/18   0533   INR  1.0   APTT  22.3     Microbiology Results (last 7 days)     ** No results found for the last 168 hours. **        Recent Lab Results     None        All pertinent labs from the last 24 hours have been reviewed.    Significant Diagnostics:  Post op Xray ordered.     Assessment/Plan:     * Spondylolisthesis, lumbar region    Pt is a 64 yo F with h/o lumbar spondylolisthesis. POD#1 s/p minimally invasive lumbar fusion at L4-L5 & L5-S1.    - Neurologically stable  - Febrile overnight at 101.6. Afebrile since then. Ordered IS at bedside and encouraged use every hour while awake. Duonebs q4h while awake. Ordered CXR, UA. Follow-up results.   - HTN: SBP 140s-170s. Continue home BP meds, losartan and furosemide. Start Metoprolol 25 BID. Will continue to monitor.  - Tachycardia: 114-120s. Start Metoprolol 25 BID. Will continue to monitor.   - Pain: controlled with current pain regimen.   - Incision: Bacitracin to incision BID. Discussed proper wound care.   - Post op imaging pending.   - Brace: LSO brace to be worn when out of bed or sitting on side of bed. Can remove when lying in bed.   - Activity: Continue PT/OT. OOB > 6hrs/day  - Diet: tolerating PO  - Voiding: nash dc this morning. Voiding spontaneously.   - Continue current bowel regimen, senna and miralax.   - DVT prophylaxis: Heparin, Compression stockings, SCDs in place  - IS at bedside. Encourage use every hour while awake.  - Seen with Dr. Bernal this morning.     Dispo: PT/OT recommending to discharge home with home health. Pending dc tomorrow.               Joselyn Bernal PA-C  Neurosurgery  [facility]

## 2018-09-18 NOTE — PLAN OF CARE
Problem: Patient Care Overview  Goal: Plan of Care Review  Outcome: Ongoing (interventions implemented as appropriate)  Pt AAOx4, free of falls. Pt remains bedrest until later this morning per MD orders. LSO brace in use. Pt spiked a fever, Tmax 101.6 overnight, Neurosurgery resident at bedside for assessment. Pt denies distress, temperature returned WNL after second dose of Tylenol. Pt c/o pain overnight managed with PRN's. SCD's in use. Diet advancing as tolerated. Needs x-ray this morning. WCTM.

## 2018-09-18 NOTE — ASSESSMENT & PLAN NOTE
Pt is a 62 yo F with h/o lumbar spondylolisthesis. POD#1 s/p minimally invasive lumbar fusion at L4-L5 & L5-S1.    - Neurologically stable  - Febrile overnight at 101.6. Afebrile since then. Ordered IS at bedside and encouraged use every hour while awake. Duonebs q4h while awake. Ordered CXR, UA. Follow-up results.   - HTN: SBP 140s-170s. Continue home BP meds, losartan and furosemide. Start Metoprolol 25 BID. Will continue to monitor.  - Tachycardia: 114-120s. Start Metoprolol 25 BID. Will continue to monitor.   - Pain: controlled with current pain regimen.   - Incision: Bacitracin to incision BID. Discussed proper wound care.   - Post op imaging pending.   - Brace: LSO brace to be worn when out of bed or sitting on side of bed. Can remove when lying in bed.   - Activity: Continue PT/OT. OOB > 6hrs/day  - Diet: tolerating PO  - Voiding: nash dc this morning. Voiding spontaneously.   - Continue current bowel regimen, senna and miralax.   - DVT prophylaxis: Heparin, Compression stockings, SCDs in place  - IS at bedside. Encourage use every hour while awake.  - Seen with Dr. Bernal this morning.     Dispo: PT/OT recommending to discharge home with home health. Pending dc tomorrow.

## 2018-09-18 NOTE — OP NOTE
DATE OF PROCEDURE:  09/17/2018    SURGEON:  Noel Bernal M.D., Ph.D.    ASSISTANT:  Dr. Consuelo Ochoa (the assistant is a [facility]   resident).    PREOPERATIVE DIAGNOSES:  1. L3/4 spondylosis.  2. L4/5 herniated intervertebral disc.  3. L5/S1 spondylolisthesis.  4.  Atypical chest pain.  5.  History of DVT.  6.  Bilateral lower extremity swelling.    POSTOPERATIVE DIAGNOSES:  1. L3/4 spondylosis.  2. L4/5 herniated intervertebral disc.  3. L5/S1 spondylolisthesis.  4.  Atypical chest pain.  5.  History of DVT.  6.  Bilateral lower extremity swelling.    PROCEDURES PERFORMED:  1.  Minimally invasive posterior approach on the left.  2.  Left-sided L3-L4 laminectomy, facetectomy and diskectomy.  3.  Placement of expandable cage at L3-L4.  4.  Placement of autograft and allograft with associated fusion at L3-L4.  5.  L4-L5 left-sided laminectomy, facetectomy and diskectomy.  6.  Placement of expandable cage at L4-L5.  7.  Placement of autograft and allograft at L4-L5 with associated anterior   fusion.  8.  Placement of percutaneous pedicle screws at L3 and S1 with associated   posterior fixation.  9.  Neuromonitoring with SSEPs, MEPs and EMG.    ESTIMATED BLOOD LOSS:  Approximately 200 mL.    SPECIMEN:  None.    INDICATIONS IN DETAIL:  Ms. Rosa Fermin is a very pleasant 63-year-old woman   with a history of chronic low back pain.  The patient also has a history of leg   weakness.  The patient was initially seen by Dr. Tapia and had a workup,   which included an MRI and later a CT that showed lumbar spondylosis and   spondylolisthesis that is associated with her leg pain.  The patient also had   evidence of broad-based herniated disk at L4-L5 with bilateral foraminal   encroachment.  The patient was seen by Dr. Bernal and after the risks, benefits   and alternatives were described, she wished to proceed with an L3 through S1   fusion.    PROCEDURE IN DETAIL:  The patient was seen in the  pretreatment area and the   risks, benefits and alternatives were again discussed.  The patient wished to   proceed.  The patient was brought to the Operating Room and a general anesthetic   was administered.  All proper lines were placed.  The patient was turned into   the prone position on a Ahmet table.  All pressure points were padded.  The   patient's back was cleaned, prepped and draped in the usual fashion.  The   fluoroscopy again was brought to the field and the L3-L4 level was visualized.    A 2.5 cm incision was made at the shadow of the facet on the left side.  An   incision was made through the skin and down through the fascia.  A minimally   invasive approach was then made to the junction of the lamina and the facet   using the METRx set.  Soft tissue was removed from the lamina and the facet   using Bovie cautery.  Then, using a high-speed [employer] drill with a matchstick   bur, we performed a complete facetectomy on the left as well as laminectomy.    The bone dust was collected using the Hensler bone press for autograft.  The   ligamentum flavum was removed and the neural elements could clearly be seen.    The thecal sac and the nerve root were medialized.  The annulus of the disk was   opened and diskectomy was performed using curettes and rongeurs in the usual   fashion.  Once this was performed, the endplates were prepared for fusion using   disk carlos.  An expandable cage was then placed in the L3-L4 interspace under   direct visualization and fluoroscopic guidance.  Once it was in the interspace,   the cage was deployed.  We placed BMP as well as bone from the Hensler bone   press in this level for fusion.  Once this was complete, the wound was irrigated   copiously.  All bleeding points were coagulated and the retractor was removed.    We performed the same maneuver at the L4-L5 level.  The retractor was placed in   the same fashion.  We performed the laminectomy, facetectomy and diskectomy  in   the same fashion.  The expandable cage was placed at that level also.  Once the   cages were placed, we turned our attention to place percutaneous pedicle screws.    We placed percutaneous pedicle screws at L3 and at the S1 level.  The patient   has had an auto-fusion of L5 and S1.  This will provide the patient with   additional rigidity for her anterior fusion.  The wound was irrigated copiously.    All bleeding points were coagulated.  The wound was then closed in layers with   staples in the skin.  The patient was placed in a back brace.  The patient was   awakened by the Anesthesia staff.  At the point the patient was awake and   following commands, she was extubated.    There were no intraprocedural complications.    All counts were correct at the end of surgery.    We performed stimulation of the pedicles by stimulating the Jamshidi needles.      CAIO  dd: 09/18/2018 09:09:53 (CDT)  td: 09/18/2018 16:26:31 (CDT)  Doc ID   #[id number]  Job ID #[id number]    CC:

## 2018-09-18 NOTE — PLAN OF CARE
Problem: Physical Therapy Goal  Goal: Physical Therapy Goal  Goals to be met by: 2018    Patient will increase functional independence with mobility by performin. Supine to sit with Modified [place]  2. Sit to supine with Modified [place]  3. Sit to stand transfer with Supervision using appropriate AD.   4. Gait  x 150 feet with Supervision using Rolling Walker or rollator.   5. Stand for 3 minutes with Supervision.   6. Lower extremity exercise program x15 reps per handout, with supervision    Outcome: Ongoing (interventions implemented as appropriate)  PT evaluation completed- see note for details. Goals established and POC initiated. Recommending pt discharge home with home health PT & OT.     Radha Pham PT, DPT   2018  Pager: [phone]

## 2018-09-18 NOTE — SUBJECTIVE & OBJECTIVE
Interval History: Overnight pt spike a fever of 101.6. Pt denied distress at that time, per nursing note. After two doses of Tylenol, temp WNL. Pt with tachycardia ~114-120. -170s. Unable to obtain post op XR last night secondary to pain. Reports LE pain is controlled with medication. Denies increased LE weakness, numbness, paresthesias. Xray done today. Quarles removed this morning, pt voided without difficulty after.       Medications:  Continuous Infusions:  Scheduled Meds:   albuterol-ipratropium  3 mL Nebulization Q4H WAKE    bisacodyl  10 mg Rectal Daily    furosemide  20 mg Oral QHS    [START ON 9/19/2018] heparin (porcine)  5,000 Units Subcutaneous Q8H    losartan  100 mg Oral QHS    polyethylene glycol  17 g Oral Daily    senna-docusate 8.6-50 mg  1 tablet Oral BID    topiramate  100 mg Oral BID     PRN Meds:acetaminophen, aluminum-magnesium hydroxide-simethicone, morphine, ondansetron, oxyCODONE-acetaminophen, promethazine (PHENERGAN) IVPB       Objective:     Weight: 113.8 kg (250 lb 14.1 oz)  Body mass index is 44.44 kg/m².  Vital Signs (Most Recent):  Temp: 99.2 °F (37.3 °C) (09/18/18 1147)  Pulse: (!) 114 (09/18/18 1347)  Resp: 18 (09/18/18 1347)  BP: (!) 156/72 (09/18/18 1147)  SpO2: 98 % (09/18/18 1347) Vital Signs (24h Range):  Temp:  [98.2 °F (36.8 °C)-101.6 °F (38.7 °C)] 99.2 °F (37.3 °C)  Pulse:  [] 114  Resp:  [15-28] 18  SpO2:  [94 %-100 %] 98 %  BP: (137-184)/(63-86) 156/72     Date 09/18/18 0700 - 09/19/18 0659   Shift 2115-6751 5542-7084 2451-1288 24 Hour Total   INTAKE   Shift Total(mL/kg)       OUTPUT   Urine(mL/kg/hr) 350   350   Shift Total(mL/kg) 350(3.1)   350(3.1)   Weight (kg) 113.8 113.8 113.8 113.8                   Neurosurgery Physical Exam    General: well developed, well nourished, no distress.   Head: normocephalic, atraumatic  Neurologic: Alert and oriented. Thought content appropriate.  GCS: Motor: 6/Verbal: 5/Eyes: 4 GCS Total: 15  Mental Status: Awake,  Alert, Oriented x 4  Cranial nerves: CN II-XII grossly intact.   Eyes: pupils equal, round, reactive to light with accomodation, EOMI.  Pulmonary: no signs of respiratory distress  Abdomen: not tender to palpation  Sensory: decreased sensation to lateral LLE   Motor Strength: Moves all extremities spontaneously with good tone. Full strength bilateral upper extremities.  4+/5 strength to RLE, LLE. No abnormal movements seen.   Hallman: absent  Clonus: absent  Babinski: absent  Incision: c/d/i with staples in place. No surrounding erythema or discharge noted.     Significant Labs:  Recent Labs   Lab  09/17/18   0533   GLU  93   NA  141   K  3.9   CL  113*   CO2  17*   BUN  14   CREATININE  0.7   CALCIUM  9.5     Recent Labs   Lab  09/17/18   0533   WBC  4.31   HGB  10.6*   HCT  33.7*   PLT  242     Recent Labs   Lab  09/17/18   0533   INR  1.0   APTT  22.3     Microbiology Results (last 7 days)     ** No results found for the last 168 hours. **        Recent Lab Results     None        All pertinent labs from the last 24 hours have been reviewed.    Significant Diagnostics:  Post op Xray ordered.

## 2018-09-18 NOTE — PT/OT/SLP EVAL
"Physical Therapy Evaluation & Treatment    Patient Name:  Rosa Fermin   MRN:  [id number]    Recommendations:     Discharge Recommendations:  home health PT, home health OT   Discharge Equipment Recommendations: bath bench   Barriers to discharge: None    Assessment:     Rosa Fermin is a 63 y.o. female admitted with a medical diagnosis of Spondylolisthesis; s/p L4-5, L5-S1 TLIF.  She presents with the following impairments/functional limitations:  weakness, impaired endurance, gait instability, impaired functional mobilty, impaired self care skills, impaired balance, pain, impaired cardiopulmonary response to activity. Pt eager and willing to participate in therapy session and increase independence with mobility. Tolerated gait training x ~60 ft with RW and CGA, with mild SOB noted upon exertion. Pt would benefit from follow-up PT intervention to address listed deficits, reduce fall risk, and maximize (I) and safety with functional mobility.     Rehab Prognosis:  good; patient would benefit from acute skilled PT services to address these deficits and reach maximum level of function.      Recent Surgery: Procedure(s) (LRB):  FUSION, SPINE, LUMBAR, TLIF, MINIMALLY INVASIVE @ L4-L5 & L5-S1 (N/A) 1 Day Post-Op    Plan:     During this hospitalization, patient to be seen 4 x/week to address the above listed problems via gait training, therapeutic activities, therapeutic exercises, neuromuscular re-education  · Plan of Care Expires:  10/18/18   Plan of Care Reviewed with: patient, daughter    Subjective     Communicated with RN prior to session.  Patient found supine upon PT entry to room, agreeable to evaluation. Per pt: "I've been looking forward to you coming."      Patient comments/goals: return home  Pain/Comfort:  · Pain Rating 1: 4/10  · Location - Orientation 1: generalized  · Location 1: back  · Pain Addressed 1: Reposition, Distraction, Nurse notified  · Pain Rating Post-Intervention 1: " 4/10    Patients cultural, spiritual, Yazdanism conflicts given the current situation: no conflicts    Patient History:     Living Environment: Pt lives with  in [facility] with threshold to enter; bathroom has tub/shower.    Prior Level of Function: mod(I) for mobility using rollator or SPC as needed.   DME owned: rollator, SPC   Caregiver Assistance: assistance from  as needed     Objective:     Patient found with: telemetry, peripheral IV     General Precautions: Standard, fall   Orthopedic Precautions:spinal precautions   Braces: LSO     Exams:  · Postural Exam:  Patient presented with the following abnormalities:    · -       Rounded shoulders  · Sensation:    · -       Intact  · RLE ROM: WFL  · RLE Strength: WFL  · LLE ROM: WFL  · LLE Strength: WFL    Functional Mobility:       Bed Mobility  · Rolling: to right with CGA and use of handrail   · Supine to sit: CGA with use of handrail     · Sit to supine: N/T        Transfers · Sit <> Stand: minimum assistance from EOB x 1 trial with RW        Gait  · Distance: ~60 ft   · Assistance level: RW and CGA for balance and safety    · Gait Deviations: decreased step length bilaterally, decreased alfredo         Therapeutic Activities and Exercises:  Pt and daughter educated on:  - role of PT and POC/goals for therapy   - spinal precautions  - log rolling technique   - donning/doffing LSO   - RW management during gait, with cueing to increase step length bilaterally and maintain RW close to body   - safety with mobility and tips to reduce fall risk     Pt verbalized understanding and expressed no further concerns/questions.     Patient left up in chair with all lines intact, call button in reach and RN notified.    AM-PAC 6 CLICK MOBILITY  Total Score:17       GOALS:   Multidisciplinary Problems     Physical Therapy Goals        Problem: Physical Therapy Goal    Goal Priority Disciplines Outcome Goal Variances Interventions   Physical Therapy Goal     PT, PT/OT  Ongoing (interventions implemented as appropriate)     Description:  Goals to be met by: 2018    Patient will increase functional independence with mobility by performin. Supine to sit with Modified [place]  2. Sit to supine with Modified [place]  3. Sit to stand transfer with Supervision using appropriate AD.   4. Gait  x 150 feet with Supervision using Rolling Walker or rollator.   5. Stand for 3 minutes with Supervision.   6. Lower extremity exercise program x15 reps per handout, with supervision                      History:     Past Medical History:   Diagnosis Date    Atypical chest pain     Bilateral chronic knee pain     Carpal tunnel syndrome on both sides     Chronic lower back pain     Deep vein thrombosis     History of DVT (deep vein thrombosis)     left leg in the 90s    Hypertension     Osteoarthritis of both knees        Past Surgical History:   Procedure Laterality Date    COLONOSCOPY      Dr Bolton    COLONOSCOPY N/A 2017    Procedure: COLONOSCOPY;  Surgeon: Shanelle Braga MD;  Location: [facility];  Service: Endoscopy;  Laterality: N/A;    COLONOSCOPY N/A 2017    Performed by Shanelle Braga MD at [facility]    ENDOMETRIAL ABLATION N/A     FUSION, SPINE, LUMBAR, TLIF, MINIMALLY INVASIVE @ L4-L5 & L5-S1 N/A 2018    Performed by Noel Bernal MD at [facility] OR [address] [place] [address]    MINIMALLY INVASIVE TRANSFORAMINAL LUMBAR INTERBODY FUSION (TLIF) N/A 2018    Procedure: FUSION, SPINE, LUMBAR, TLIF, MINIMALLY INVASIVE @ L4-L5 & L5-S1;  Surgeon: Noel Bernal MD;  Location: [facility] OR [address] [place] [address];  Service: Neurosurgery;  Laterality: N/A;    REPLACEMENT-KNEE-TOTAL   Left 2015    Per[facility]ica [facility] at [facility] OR    SHOULDER ARTHROSCOPY W/ ROTATOR CUFF REPAIR Bilateral ,     TOTAL KNEE ARTHROPLASTY Left        Clinical Decision Making:     Low complexity evaluation:   · Stable clinical presentation   · 1-2 personal  factors/comorbidities affecting treatment   · Examining and addressing 3+ functional deficits, activity limitations, and participation restrictions    Time Tracking:     PT Received On: 09/18/18  PT Start Time: 1412     PT Stop Time: 1433  PT Total Time (min): 21 min     Billable Minutes: Evaluation 11 min and Therapeutic Activity 10 min      Radha Pham PT, DPT   9/18/2018  Pager: [phone]

## 2018-09-19 LAB
ANION GAP SERPL CALC-SCNC: 6 MMOL/L
BASOPHILS # BLD AUTO: 0.02 K/UL
BASOPHILS NFR BLD: 0.3 %
BUN SERPL-MCNC: 7 MG/DL
CALCIUM SERPL-MCNC: 9 MG/DL
CHLORIDE SERPL-SCNC: 115 MMOL/L
CO2 SERPL-SCNC: 18 MMOL/L
CREAT SERPL-MCNC: 0.7 MG/DL
DIFFERENTIAL METHOD: ABNORMAL
EOSINOPHIL # BLD AUTO: 0 K/UL
EOSINOPHIL NFR BLD: 0.3 %
ERYTHROCYTE [DISTWIDTH] IN BLOOD BY AUTOMATED COUNT: 15.9 %
EST. GFR  (AFRICAN AMERICAN): >60 ML/MIN/1.73 M^2
EST. GFR  (NON AFRICAN AMERICAN): >60 ML/MIN/1.73 M^2
GLUCOSE SERPL-MCNC: 145 MG/DL
HCT VFR BLD AUTO: 32.6 %
HGB BLD-MCNC: 10.9 G/DL
IMM GRANULOCYTES # BLD AUTO: 0.04 K/UL
IMM GRANULOCYTES NFR BLD AUTO: 0.5 %
LYMPHOCYTES # BLD AUTO: 1.3 K/UL
LYMPHOCYTES NFR BLD: 16.5 %
MCH RBC QN AUTO: 27.7 PG
MCHC RBC AUTO-ENTMCNC: 33.4 G/DL
MCV RBC AUTO: 83 FL
MONOCYTES # BLD AUTO: 0.7 K/UL
MONOCYTES NFR BLD: 9.2 %
NEUTROPHILS # BLD AUTO: 5.8 K/UL
NEUTROPHILS NFR BLD: 73.2 %
NRBC BLD-RTO: 0 /100 WBC
PLATELET # BLD AUTO: 256 K/UL
PMV BLD AUTO: 8.7 FL
POTASSIUM SERPL-SCNC: 3.4 MMOL/L
RBC # BLD AUTO: 3.94 M/UL
SODIUM SERPL-SCNC: 139 MMOL/L
WBC # BLD AUTO: 7.96 K/UL

## 2018-09-19 PROCEDURE — 25000003 PHARM REV CODE 250: Performed by: PHYSICIAN ASSISTANT

## 2018-09-19 PROCEDURE — 80048 BASIC METABOLIC PNL TOTAL CA: CPT

## 2018-09-19 PROCEDURE — 97165 OT EVAL LOW COMPLEX 30 MIN: CPT

## 2018-09-19 PROCEDURE — 85025 COMPLETE CBC W/AUTO DIFF WBC: CPT

## 2018-09-19 PROCEDURE — 36415 COLL VENOUS BLD VENIPUNCTURE: CPT

## 2018-09-19 PROCEDURE — 97116 GAIT TRAINING THERAPY: CPT

## 2018-09-19 PROCEDURE — 99024 POSTOP FOLLOW-UP VISIT: CPT | Mod: ,,, | Performed by: PHYSICIAN ASSISTANT

## 2018-09-19 PROCEDURE — 20600001 HC STEP DOWN PRIVATE ROOM

## 2018-09-19 PROCEDURE — 25000003 PHARM REV CODE 250: Performed by: STUDENT IN AN ORGANIZED HEALTH CARE EDUCATION/TRAINING PROGRAM

## 2018-09-19 PROCEDURE — 63600175 PHARM REV CODE 636 W HCPCS: Performed by: PHYSICIAN ASSISTANT

## 2018-09-19 RX ORDER — ACETAMINOPHEN 325 MG/1
650 TABLET ORAL EVERY 4 HOURS PRN
Status: DISCONTINUED | OUTPATIENT
Start: 2018-09-19 | End: 2018-09-20 | Stop reason: HOSPADM

## 2018-09-19 RX ORDER — CEFAZOLIN SODIUM 1 G/3ML
2 INJECTION, POWDER, FOR SOLUTION INTRAMUSCULAR; INTRAVENOUS
Status: DISCONTINUED | OUTPATIENT
Start: 2018-09-19 | End: 2018-09-20 | Stop reason: HOSPADM

## 2018-09-19 RX ADMIN — TOPIRAMATE 100 MG: 25 TABLET, FILM COATED ORAL at 08:09

## 2018-09-19 RX ADMIN — BACITRACIN: 500 OINTMENT TOPICAL at 10:09

## 2018-09-19 RX ADMIN — Medication 0.5 MG: at 01:09

## 2018-09-19 RX ADMIN — CEFAZOLIN 2 G: 330 INJECTION, POWDER, FOR SOLUTION INTRAMUSCULAR; INTRAVENOUS at 08:09

## 2018-09-19 RX ADMIN — DOCUSATE SODIUM -SENNOSIDES 1 TABLET: 50; 8.6 TABLET, COATED ORAL at 08:09

## 2018-09-19 RX ADMIN — OXYCODONE AND ACETAMINOPHEN 1 TABLET: 7.5; 325 TABLET ORAL at 10:09

## 2018-09-19 RX ADMIN — HEPARIN SODIUM 5000 UNITS: 5000 INJECTION, SOLUTION INTRAVENOUS; SUBCUTANEOUS at 08:09

## 2018-09-19 RX ADMIN — HEPARIN SODIUM 5000 UNITS: 5000 INJECTION, SOLUTION INTRAVENOUS; SUBCUTANEOUS at 05:09

## 2018-09-19 RX ADMIN — OXYCODONE AND ACETAMINOPHEN 1 TABLET: 7.5; 325 TABLET ORAL at 03:09

## 2018-09-19 RX ADMIN — BACITRACIN: 500 OINTMENT TOPICAL at 08:09

## 2018-09-19 RX ADMIN — CEFAZOLIN 2 G: 330 INJECTION, POWDER, FOR SOLUTION INTRAMUSCULAR; INTRAVENOUS at 12:09

## 2018-09-19 RX ADMIN — FUROSEMIDE 20 MG: 20 TABLET ORAL at 08:09

## 2018-09-19 RX ADMIN — LOSARTAN POTASSIUM 100 MG: 50 TABLET ORAL at 08:09

## 2018-09-19 RX ADMIN — POLYETHYLENE GLYCOL 3350 17 G: 17 POWDER, FOR SOLUTION ORAL at 10:09

## 2018-09-19 RX ADMIN — TOPIRAMATE 100 MG: 25 TABLET, FILM COATED ORAL at 10:09

## 2018-09-19 RX ADMIN — HEPARIN SODIUM 5000 UNITS: 5000 INJECTION, SOLUTION INTRAVENOUS; SUBCUTANEOUS at 02:09

## 2018-09-19 RX ADMIN — METOPROLOL TARTRATE 25 MG: 25 TABLET ORAL at 10:09

## 2018-09-19 RX ADMIN — DOCUSATE SODIUM -SENNOSIDES 1 TABLET: 50; 8.6 TABLET, COATED ORAL at 10:09

## 2018-09-19 RX ADMIN — METOPROLOL TARTRATE 25 MG: 25 TABLET ORAL at 08:09

## 2018-09-19 NOTE — NURSING
Spoke with Dr. Mcintyre with neursurgery regarding patient elevated temp. Received ok to give patient tylenol. Placed orders for blood cultures and US per MD order. Urine specimen cup at bedside awaiting patient to provide sample. Chest xray done this afternoon.

## 2018-09-19 NOTE — PT/OT/SLP PROGRESS
"Physical Therapy Treatment    Patient Name:  Rosa Fermin   MRN:  [id number]  Admitting Diagnosis: Spondylolisthesis, lumbar region  Recent Surgery: Procedure(s) (LRB):  FUSION, SPINE, LUMBAR, TLIF, MINIMALLY INVASIVE @ L4-L5 & L5-S1 (N/A) 2 Days Post-Op    Recommendations:     Discharge Recommendations:  home health PT   Discharge Equipment Recommendations: bath bench   Barriers to discharge: None    Plan:     During this hospitalization, patient to be seen 4 x/week to address the above listed problems via gait training, therapeutic activities, therapeutic exercises, neuromuscular re-education  · Plan of Care Expires:  10/18/18   Plan of Care Reviewed with: patient, daughter    This Plan of care has been discussed with the patient who was involved in its development and understands and is in agreement with the identified goals and treatment plan    Subjective     Communicated with RN (John) prior to session.     Patient comments: "I was in a lot of pain this morning after coming back from a test"  Pain/Comfort:  · Pain Rating 1: 4/10  · Location - Orientation 1: generalized  · Location 1: back  · Pain Addressed 1: Reposition, Distraction, Cessation of Activity  · Pain Rating Post-Intervention 1: (No rating provided)    Objective:     Patient found with: telemetry(LSO)    Patient found UIC upon PT entry to room, agreeable to treatment.  Daughter present in the room.    General Precautions: Standard, Cardiac fall   Orthopedic Precautions:spinal precautions   Braces: LSO       BED MOBILITY        NP 2* pt found/left seated UIC    TRANSFERS  (vc's for hand placement, sequencing of task and safety)   Patient completed Sit <> Stand Transfer from BS chair with min A for hip ext, balance and safety with RW xmultiple trials   Patient completed Stand <> Sit Transfer to BS chair with SBA for safety with rolling walker      GAIT:    Patient ambulated: 100ft   Patient required: SBA for safety   Patient used:  Rolling " Walker   Gait Pattern observed: reciprocal gait   Gait Deviation(s): decreased alfredo, increased time in double stance, decreased velocity of limb motion, decreased step length, decreased stride length and slight FFP    Comments: vc's for upright posture, placement of AD and safety    EDUCATION  Patient provided with daily orientation and goals of this PT session.  Pt was educated on spinal precautions with 2/3 recall.  They were educated to call for assistance and to transfer with hospital staff of family only.  Also, pt was educated on the effects of prolonged immobility and the importance of performing OOB activity to promote healing and reduce recovery time      Whiteboard updated with correct mobility information. RN/PCT notified.  Pt safe to transfer OOB with RN/PCT via step transfer: Use RW with min A of 1 person.    Patient left up in chair, with  all lines intact, call button in reach, RN notified and daughter present    AM-PAC 6 CLICK MOBILITY  Turning over in bed (including adjusting bedclothes, sheets and blankets)?: 3  Sitting down on and standing up from a chair with arms (e.g., wheelchair, bedside commode, etc.): 3  Moving from lying on back to sitting on the side of the bed?: 3  Moving to and from a bed to a chair (including a wheelchair)?: 3  Need to walk in hospital room?: 3  Climbing 3-5 steps with a railing?: 2  Basic Mobility Total Score: 17     Assessment:     Rosa Fermin is a 63 y.o. female admitted with a medical diagnosis of Spondylolisthesis, lumbar region.  She presents with the following impairments/functional limitations:  weakness, impaired endurance, impaired self care skills, impaired functional mobilty, gait instability, impaired balance, decreased coordination, decreased lower extremity function, orthopedic precautions. requiring assistance and verbal cues for transfers and gait to ensure adherence to spinal prec during OOB mobility.  Pt will cont to benefit from skilled  PT intervention to address deficits and improve functional mobility.    Rehab Prognosis:  Good; patient would benefit from acute skilled PT services to address these deficits and reach maximum level of function.      GOALS:   Multidisciplinary Problems     Physical Therapy Goals        Problem: Physical Therapy Goal    Goal Priority Disciplines Outcome Goal Variances Interventions   Physical Therapy Goal     PT, PT/OT Ongoing (interventions implemented as appropriate)     Description:  Goals to be met by: 2018    Patient will increase functional independence with mobility by performin. Supine to sit with Modified [place]  2. Sit to supine with Modified [place]  3. Sit to stand transfer with Supervision using appropriate AD.   4. Gait  x 150 feet with Supervision using Rolling Walker or rollator.   5. Stand for 3 minutes with Supervision.   6. Lower extremity exercise program x15 reps per handout, with supervision                      Time Tracking:     PT Received On: 18  PT Start Time: 1413     PT Stop Time: 1433  PT Total Time (min): 20 min     Billable Minutes: Gait Training 20    Treatment Type: Treatment  PT/PTA: PTA     PTA Visit Number: 1       Zhanna Corbin PTA.  Pager [phone]    2018    .

## 2018-09-19 NOTE — ASSESSMENT & PLAN NOTE
Pt is a 62 yo F with h/o lumbar spondylolisthesis. POD#2 s/p minimally invasive lumbar fusion at L4-L5 & L5-S1.    - Neurologically stable  - Febrile this morning at 101.5. Afebrile since then. Ordered IS at bedside and encouraged use every hour while awake. Duonebs q4h while awake. CXR negative. US bilateral LE negative. Blood cultures show no growth to date. UA pending. Will follow-up UA and continue to monitor.    - HTN: SBP 140s. Continue home BP meds, losartan and furosemide. Continue Metoprolol 25 BID. Pt reports home SBP runs around 149. Will continue to monitor.  - Tachycardia: 114-120s. Pt showing no signs of distress on exam. Continue Metoprolol 25 BID. Will continue to monitor.   - Pain: controlled with current pain regimen.   - Incision: Bacitracin to incision BID. Discussed proper wound care.   - Post op XR lumbar spine: postoperative findings satisfactory    - Brace: LSO brace to be worn when out of bed or sitting on side of bed. Can remove when lying in bed.   - Activity: Continue PT/OT. OOB > 6hrs/day  - Diet: tolerating PO  - Voiding: Voiding spontaneously.   - Continue current bowel regimen, senna and miralax.   - DVT prophylaxis: Heparin, Compression stockings, SCDs in place  - IS at bedside. Encourage use every hour while awake.       Dispo: PT/OT recommending to discharge home with home health. Discharge pending resolution of fever and tachycardia.

## 2018-09-19 NOTE — PLAN OF CARE
Problem: Occupational Therapy Goal  Goal: Occupational Therapy Goal  Goals to be met by: 9/26/18    Patient will increase functional independence with ADLs by performing:    UE Dressing with Set-up Assistance.  LE Dressing with Supervision and Assistive Devices as needed.  Grooming while standing at sink with Set-up Assistance.  Toileting from toilet with Stand-by Assistance for hygiene and clothing management.   Rolling to Bilateral with Modified [place].   Supine to sit with Supervision.  Step transfer with Supervision  Toilet transfer to toilet with Supervision.    Outcome: Ongoing (interventions implemented as appropriate)  Con't POC.    BALBIR Alejandro

## 2018-09-19 NOTE — PLAN OF CARE
Problem: Patient Care Overview  Goal: Plan of Care Review  Outcome: Ongoing (interventions implemented as appropriate)  Pt.  AAOx4. OOB with LSO brace. Pt ambulated with PT in hallway and to bedside chair. Pain managed well with Hydrocodone per MAR. VSS. Pt. is compliant with IS  And continues with Seq compression stockings.WCTM.

## 2018-09-19 NOTE — PROGRESS NOTES
[facility]  Neurosurgery  Progress Note    Subjective:     History of Present Illness: The patient is a 63 y.o. female who was referred to me by Dr. Tapia. The pt complains of acute on chronic low back pain.  She has a long hx of back pain and was followed by an outside facility for her back pain for many years which was well-maintained with medication and TODD's. However, her back pain has been progressing since December but has significantly worsened within the last two months. She notes pain in her bilateral lower pain with intermittent radiation into the RLE. She states the pain has limited her physical activities and is exacerbated when sitting or laying supine. She has not been able to sleep on her bed because the pain is intolerable and is now sleeping on her couch. She rates the pain a 8-9/10 without pain medication and 3/10 with medication. She endorses BLE weakness which is worse on the left, secondary to back pain and chronic knee pain. She has a hx of left knee surgery. She works as a  in the setting of outpatient clinics and hospitals. The pt reports no hx of cigarette use. Pt has a hx of HTN but no cardiac hx.        Post-Op Info:  Procedure(s) (LRB):  FUSION, SPINE, LUMBAR, TLIF, MINIMALLY INVASIVE @ L4-L5 & L5-S1 (N/A)   2 Days Post-Op     Interval History: Pt febrile this morning at 101.5 and tachycardia 116-126. Continued back pain and LE pain, controlled with medication. Weakness in bilateral LE secondary to pain. Denies new weakness, numbness, paresthesieas. XR today. Voiding spontaneously. BM yesterday, on Senna and Miralax.       Medications:  Continuous Infusions:  Scheduled Meds:   bacitracin   Topical (Top) BID    ceFAZolin (ANCEF) IVPB  2 g Intravenous Q8H    furosemide  20 mg Oral QHS    heparin (porcine)  5,000 Units Subcutaneous Q8H    losartan  100 mg Oral QHS    metoprolol tartrate  25 mg Oral BID    polyethylene glycol  17 g Oral Daily     senna-docusate 8.6-50 mg  1 tablet Oral BID    topiramate  100 mg Oral BID     PRN Meds:acetaminophen, aluminum-magnesium hydroxide-simethicone, bisacodyl, morphine, ondansetron, oxyCODONE-acetaminophen, promethazine (PHENERGAN) IVPB       Objective:     Weight: 113.8 kg (250 lb 14.1 oz)  Body mass index is 44.44 kg/m².  Vital Signs (Most Recent):  Temp: (!) 101.5 °F (38.6 °C) (09/19/18 0749)  Pulse: 110 (09/19/18 1128)  Resp: 16 (09/19/18 0749)  BP: (!) 145/70 (09/19/18 0749)  SpO2: 97 % (09/19/18 0749) Vital Signs (24h Range):  Temp:  [98.5 °F (36.9 °C)-101.6 °F (38.7 °C)] 101.5 °F (38.6 °C)  Pulse:  [] 110  Resp:  [16-18] 16  SpO2:  [97 %-99 %] 97 %  BP: (142-178)/(70-81) 145/70                    Neurosurgery Physical Exam     General: well developed, well nourished, no distress.   Head: normocephalic, atraumatic  Neurologic: Alert and oriented. Thought content appropriate.  GCS: Motor: 6/Verbal: 5/Eyes: 4 GCS Total: 15  Mental Status: Awake, Alert, Oriented x 4  Cranial nerves: CN II-XII grossly intact.   Eyes: pupils equal, round, reactive to light with accomodation, EOMI.  Pulmonary: no signs of respiratory distress  Abdomen: not tender to palpation  Sensory: intact sensation throughout   Motor Strength: Moves all extremities spontaneously with good tone. Full strength bilateral upper extremities.  4+/5 strength to bilateral LE, exam pain limited. No abnormal movements seen.   Hallman: absent  Clonus: absent  Babinski: absent  Incision: c/d/i with staples in place. No surrounding erythema or discharge noted.       Significant Labs:  Recent Labs   Lab  09/19/18 0948   GLU  145*   NA  139   K  3.4*   CL  115*   CO2  18*   BUN  7*   CREATININE  0.7   CALCIUM  9.0     Recent Labs   Lab  09/19/18 0948   WBC  7.96   HGB  10.9*   HCT  32.6*   PLT  256     No results for input(s): LABPT, INR, APTT in the last 48 hours.  Microbiology Results (last 7 days)     Procedure Component Value Units Date/Time     Blood culture [[id number]] Collected:  09/18/18 1925    Order Status:  Completed Specimen:  Blood Updated:  09/19/18 0315     Blood Culture, Routine No Growth to date    Urine culture [[id number]] Collected:  09/18/18 2112    Order Status:  Sent Specimen:  Urine, Clean Catch Updated:  09/18/18 2229        Recent Lab Results       09/19/18 0948 09/18/18 1925      Immature Granulocytes 0.5      Immature Grans (Abs) 0.04  Comment:  Mild elevation in immature granulocytes is non specific and   can be seen in a variety of conditions including stress response,   acute inflammation, trauma and pregnancy. Correlation with other   laboratory and clinical findings is essential.        Anion Gap 6      Baso # 0.02      Basophil% 0.3      Blood Culture, Routine  No Growth to date[P]     BUN, Bld 7      Calcium 9.0      Chloride 115      CO2 18      Creatinine 0.7      Differential Method Automated      eGFR if African American >60.0      eGFR if non  >60.0  Comment:  Calculation used to obtain the estimated glomerular filtration  rate (eGFR) is the CKD-EPI equation.         Eos # 0.0      Eosinophil% 0.3      Glucose 145      Gran # (ANC) 5.8      Gran% 73.2      Hematocrit 32.6      Hemoglobin 10.9      Lymph # 1.3      Lymph% 16.5      MCH 27.7      MCHC 33.4      MCV 83      Mono # 0.7      Mono% 9.2      MPV 8.7      nRBC 0      Platelets 256      Potassium 3.4      RBC 3.94      RDW 15.9      Sodium 139      WBC 7.96          All pertinent labs from the last 24 hours have been reviewed.    Significant Diagnostics:  XR lumbar spine 9/19: no postoperative changes    I have reviewed all pertinent imaging results/findings within the past 24 hours.    Assessment/Plan:     * Spondylolisthesis, lumbar region    Pt is a 62 yo F with h/o lumbar spondylolisthesis. POD#2 s/p minimally invasive lumbar fusion at L4-L5 & L5-S1.    - Neurologically stable  - Febrile this morning at 101.5. Afebrile since then. Ordered IS  at bedside and encouraged use every hour while awake. Duonebs q4h while awake. CXR negative. US bilateral LE negative. Blood cultures show no growth to date. UA pending. Will follow-up UA and continue to monitor.    - HTN: SBP 140s. Continue home BP meds, losartan and furosemide. Continue Metoprolol 25 BID. Pt reports home SBP runs around 149. Will continue to monitor.  - Tachycardia: 114-120s. Pt showing no signs of distress on exam. Continue Metoprolol 25 BID. Will continue to monitor.   - Pain: controlled with current pain regimen.   - Incision: Bacitracin to incision BID. Discussed proper wound care.   - Post op XR lumbar spine: postoperative findings satisfactory    - Brace: LSO brace to be worn when out of bed or sitting on side of bed. Can remove when lying in bed.   - Activity: Continue PT/OT. OOB > 6hrs/day  - Diet: tolerating PO  - Voiding: Voiding spontaneously.   - Continue current bowel regimen, senna and miralax.   - DVT prophylaxis: Heparin, Compression stockings, SCDs in place  - IS at bedside. Encourage use every hour while awake.       Dispo: PT/OT recommending to discharge home with home health. Discharge pending resolution of fever and tachycardia.               Joselyn Bernal PA-C  Neurosurgery  [facility]

## 2018-09-19 NOTE — SUBJECTIVE & OBJECTIVE
Interval History: Pt febrile this morning at 101.5 and tachycardia 116-126. Continued back pain and LE pain, controlled with medication. Weakness in bilateral LE secondary to pain. Denies new weakness, numbness, paresthesieas. XR today. Voiding spontaneously. No BM, on Senna and Miralax.       Medications:  Continuous Infusions:  Scheduled Meds:   bacitracin   Topical (Top) BID    ceFAZolin (ANCEF) IVPB  2 g Intravenous Q8H    furosemide  20 mg Oral QHS    heparin (porcine)  5,000 Units Subcutaneous Q8H    losartan  100 mg Oral QHS    metoprolol tartrate  25 mg Oral BID    polyethylene glycol  17 g Oral Daily    senna-docusate 8.6-50 mg  1 tablet Oral BID    topiramate  100 mg Oral BID     PRN Meds:acetaminophen, aluminum-magnesium hydroxide-simethicone, bisacodyl, morphine, ondansetron, oxyCODONE-acetaminophen, promethazine (PHENERGAN) IVPB       Objective:     Weight: 113.8 kg (250 lb 14.1 oz)  Body mass index is 44.44 kg/m².  Vital Signs (Most Recent):  Temp: (!) 101.5 °F (38.6 °C) (09/19/18 0749)  Pulse: 110 (09/19/18 1128)  Resp: 16 (09/19/18 0749)  BP: (!) 145/70 (09/19/18 0749)  SpO2: 97 % (09/19/18 0749) Vital Signs (24h Range):  Temp:  [98.5 °F (36.9 °C)-101.6 °F (38.7 °C)] 101.5 °F (38.6 °C)  Pulse:  [] 110  Resp:  [16-18] 16  SpO2:  [97 %-99 %] 97 %  BP: (142-178)/(70-81) 145/70                    Neurosurgery Physical Exam     General: well developed, well nourished, no distress.   Head: normocephalic, atraumatic  Neurologic: Alert and oriented. Thought content appropriate.  GCS: Motor: 6/Verbal: 5/Eyes: 4 GCS Total: 15  Mental Status: Awake, Alert, Oriented x 4  Cranial nerves: CN II-XII grossly intact.   Eyes: pupils equal, round, reactive to light with accomodation, EOMI.  Pulmonary: no signs of respiratory distress  Abdomen: not tender to palpation  Sensory: intact sensation throughout   Motor Strength: Moves all extremities spontaneously with good tone. Full strength bilateral upper  extremities.  4+/5 strength to bilateral LE. No abnormal movements seen.   Hallman: absent  Clonus: absent  Babinski: absent  Incision: c/d/i with staples in place. No surrounding erythema or discharge noted.       Significant Labs:  Recent Labs   Lab  09/19/18   0948   GLU  145*   NA  139   K  3.4*   CL  115*   CO2  18*   BUN  7*   CREATININE  0.7   CALCIUM  9.0     Recent Labs   Lab  09/19/18   0948   WBC  7.96   HGB  10.9*   HCT  32.6*   PLT  256     No results for input(s): LABPT, INR, APTT in the last 48 hours.  Microbiology Results (last 7 days)     Procedure Component Value Units Date/Time    Blood culture [[id number]] Collected:  09/18/18 1925    Order Status:  Completed Specimen:  Blood Updated:  09/19/18 0315     Blood Culture, Routine No Growth to date    Urine culture [[id number]] Collected:  09/18/18 2112    Order Status:  Sent Specimen:  Urine, Clean Catch Updated:  09/18/18 2229        Recent Lab Results       09/19/18 0948 09/18/18 1925      Immature Granulocytes 0.5      Immature Grans (Abs) 0.04  Comment:  Mild elevation in immature granulocytes is non specific and   can be seen in a variety of conditions including stress response,   acute inflammation, trauma and pregnancy. Correlation with other   laboratory and clinical findings is essential.        Anion Gap 6      Baso # 0.02      Basophil% 0.3      Blood Culture, Routine  No Growth to date[P]     BUN, Bld 7      Calcium 9.0      Chloride 115      CO2 18      Creatinine 0.7      Differential Method Automated      eGFR if African American >60.0      eGFR if non  >60.0  Comment:  Calculation used to obtain the estimated glomerular filtration  rate (eGFR) is the CKD-EPI equation.         Eos # 0.0      Eosinophil% 0.3      Glucose 145      Gran # (ANC) 5.8      Gran% 73.2      Hematocrit 32.6      Hemoglobin 10.9      Lymph # 1.3      Lymph% 16.5      MCH 27.7      MCHC 33.4      MCV 83      Mono # 0.7      Mono% 9.2      MPV 8.7       nRBC 0      Platelets 256      Potassium 3.4      RBC 3.94      RDW 15.9      Sodium 139      WBC 7.96          All pertinent labs from the last 24 hours have been reviewed.    Significant Diagnostics:  XR lumbar spine 9/19: no postoperative changes    I have reviewed all pertinent imaging results/findings within the past 24 hours.

## 2018-09-19 NOTE — PLAN OF CARE
Problem: Physical Therapy Goal  Goal: Physical Therapy Goal  Goals to be met by: 2018    Patient will increase functional independence with mobility by performin. Supine to sit with Modified [place]  2. Sit to supine with Modified [place]  3. Sit to stand transfer with Supervision using appropriate AD.   4. Gait  x 150 feet with Supervision using Rolling Walker or rollator.   5. Stand for 3 minutes with Supervision.   6. Lower extremity exercise program x15 reps per handout, with supervision       Discharge Recommendations: HH PT    Pt safe to transfer OOB with RN/PCT via step transfer: Use RW with min A of 1 person.    Goals remain appropriate.     Zhanna Corbin, PTA.   [phone]   2018

## 2018-09-19 NOTE — PT/OT/SLP EVAL
Occupational Therapy   Evaluation    Name: Rosa Fermin  MRN: [id number]  Admitting Diagnosis:  Spondylolisthesis, lumbar region 2 Days Post-Op    Recommendations:     Discharge Recommendations: home health OT  Discharge Equipment Recommendations:  bath bench, walker, rolling  Barriers to discharge:  None    History:     Occupational Profile:  Living Environment: Pt lives with  in [facility] with threshold to enter; bathroom has tub/shower.    Prior Level of Function: mod(I) for mobility using rollator or SPC as needed and (I) with ADLs. Works FT as a .  Equipment Used at Home:  cane, straight, rollator  Assistance upon Discharge: Limited from     Past Medical History:   Diagnosis Date    Atypical chest pain     Bilateral chronic knee pain     Carpal tunnel syndrome on both sides     Chronic lower back pain     Deep vein thrombosis     History of DVT (deep vein thrombosis)     left leg in the 90s    Hypertension     Osteoarthritis of both knees        Past Surgical History:   Procedure Laterality Date    COLONOSCOPY  2007    Dr Bolton    COLONOSCOPY N/A 12/11/2017    Procedure: COLONOSCOPY;  Surgeon: Shanelle Braga MD;  Location: [facility];  Service: Endoscopy;  Laterality: N/A;    COLONOSCOPY N/A 12/11/2017    Performed by Shanelle Braga MD at [facility]    ENDOMETRIAL ABLATION N/A 2003    FUSION, SPINE, LUMBAR, TLIF, MINIMALLY INVASIVE @ L4-L5 & L5-S1 N/A 9/17/2018    Performed by Noel Bernal MD at [facility] OR [address] [place] [address]    MINIMALLY INVASIVE TRANSFORAMINAL LUMBAR INTERBODY FUSION (TLIF) N/A 9/17/2018    Procedure: FUSION, SPINE, LUMBAR, TLIF, MINIMALLY INVASIVE @ L4-L5 & L5-S1;  Surgeon: Noel Bernal MD;  Location: [facility] OR [address] [place] [address];  Service: Neurosurgery;  Laterality: N/A;    REPLACEMENT-KNEE-TOTAL   Left 11/19/2015    [facility]ica [facility] at [facility] OR    SHOULDER ARTHROSCOPY W/ ROTATOR CUFF REPAIR Bilateral 2012, 2013    TOTAL KNEE  ARTHROPLASTY Left        Subjective     Pain/Comfort:  · Pain Rating 1: 0/10    Patients cultural, spiritual, Amish conflicts given the current situation:      Objective:     Communicated with: RN prior to session.  Patient found all lines intact and call button in reach and telemetry upon OT entry to room.    General Precautions: Standard, fall   Orthopedic Precautions:spinal precautions   Braces: LSO     Occupational Performance:    Bed Mobility:    · Patient completed Rolling/Turning to Right with minimum assistance  · Patient completed Scooting/Bridging with stand by assistance  · Patient completed Supine to Sit with minimum assistance    Functional Mobility/Transfers:  · Patient completed Sit <> Stand Transfer with minimum assistance  with  rolling walker   · Patient completed Bed <> Chair Transfer using Step Transfer technique with contact guard assistance with rolling walker  · Functional Mobility: ~65' CGA with RW    Activities of Daily Living:  · Grooming: supervision   · Upper Body Dressing: minimum assistance   · Lower Body Dressing: total assistance     Cognitive/Visual Perceptual:  Cognitive/Psychosocial Skills:     -       Oriented to: Person, Place, Time and Situation   -       Safety awareness/insight to disability: intact     Physical Exam:  BUE AROM/MMT: WNL    AMPAC 6 Click ADL:  AMPAC Total Score: 16    Treatment & Education:  UE ROM/MMT  Bed mobility training / assessment  Functional mobility assessment  Sit/standing balance assessment  Educated on importance of sitting OOB in bedside chair to promote increased strength, endurance & breathing.  Discussed OT POC / Post-acute plan  Educated on log-roll and spinal precautions.  Education:    Patient left up in chair with all lines intact and call button in reach    Assessment:     Rosa Fermin is a 63 y.o. female with a medical diagnosis of Spondylolisthesis, lumbar region.  She presents with the following performance deficits  "affecting function: weakness, impaired endurance, impaired self care skills, impaired balance, gait instability, impaired functional mobilty, decreased coordination, orthopedic precautions, decreased lower extremity function.  Pt with slow/guarded gait but otherwise tolerated well. Pt. Currently demonstrates decreased (I) in multiple ADL areas, functional mobility & t/fs as well as decreased overall strength, ROM, endurance and balance. Pt would benefit from skilled OT services as well as HHOT/PT to address these deficits and to facilitate improving (I) with daily tasks.    Rehab Prognosis: Good; patient would benefit from acute skilled OT services to address these deficits and reach maximum level of function.         Clinical Decision Makin.  OT Low:  "Pt evaluation falls under low complexity for evaluation coding due to performance deficits noted in 1-3 areas as stated above and 0 co-morbities affecting current functional status. Data obtained from problem focused assessments. No modifications or assistance was required for completion of evaluation. Only brief occupational profile and history review completed."     Plan:     Patient to be seen 3 x/week to address the above listed problems via self-care/home management, therapeutic activities, therapeutic exercises  · Plan of Care Expires: 10/19/18  · Plan of Care Reviewed with: patient, daughter    This Plan of care has been discussed with the patient who was involved in its development and understands and is in agreement with the identified goals and treatment plan    GOALS:   Multidisciplinary Problems     Occupational Therapy Goals        Problem: Occupational Therapy Goal    Goal Priority Disciplines Outcome Interventions   Occupational Therapy Goal     OT, PT/OT Ongoing (interventions implemented as appropriate)    Description:  Goals to be met by: 18    Patient will increase functional independence with ADLs by performing:    UE Dressing with " Set-up Assistance.  LE Dressing with Supervision and Assistive Devices as needed.  Grooming while standing at sink with Set-up Assistance.  Toileting from toilet with Stand-by Assistance for hygiene and clothing management.   Rolling to Bilateral with Modified [place].   Supine to sit with Supervision.  Step transfer with Supervision  Toilet transfer to toilet with Supervision.                      Time Tracking:     OT Date of Treatment: 09/19/18  OT Start Time: 1126  OT Stop Time: 1145  OT Total Time (min): 19 min    Billable Minutes:Evaluation 19    BALBIR Alejandro  9/19/2018

## 2018-09-19 NOTE — PLAN OF CARE
Problem: Patient Care Overview  Goal: Plan of Care Review  Outcome: Ongoing (interventions implemented as appropriate)   09/19/18 0333   Coping/Psychosocial   Plan Of Care Reviewed With patient;daughter     Pt aaox4, VSS, afebrile overnight. U/s of legs completed, urine sample sent. PRN pain meds administered, daughter remain at bedside. No acute changes overnight. WC      Problem: Fall Risk (Adult)  Intervention: Safety Promotion/Fall Prevention   09/19/18 0333   Safety Interventions   Safety Promotion/Fall Prevention assistive device/personal item within reach;bedside commode chair;Fall Risk reviewed with patient/family;Fall Risk signage in place;family to remain at bedside;high risk medications identified;medications reviewed;nonskid shoes/socks when out of bed;side rails raised x 2;instructed to call staff for mobility;supervised activity

## 2018-09-20 VITALS
SYSTOLIC BLOOD PRESSURE: 125 MMHG | TEMPERATURE: 99 F | OXYGEN SATURATION: 98 % | DIASTOLIC BLOOD PRESSURE: 71 MMHG | RESPIRATION RATE: 18 BRPM | WEIGHT: 250.88 LBS | HEIGHT: 63 IN | BODY MASS INDEX: 44.45 KG/M2 | HEART RATE: 98 BPM

## 2018-09-20 PROCEDURE — 25000003 PHARM REV CODE 250: Performed by: PHYSICIAN ASSISTANT

## 2018-09-20 PROCEDURE — 63600175 PHARM REV CODE 636 W HCPCS: Performed by: PHYSICIAN ASSISTANT

## 2018-09-20 PROCEDURE — 25000003 PHARM REV CODE 250: Performed by: STUDENT IN AN ORGANIZED HEALTH CARE EDUCATION/TRAINING PROGRAM

## 2018-09-20 PROCEDURE — 99024 POSTOP FOLLOW-UP VISIT: CPT | Mod: ,,, | Performed by: PHYSICIAN ASSISTANT

## 2018-09-20 RX ORDER — OXYCODONE AND ACETAMINOPHEN 7.5; 325 MG/1; MG/1
1 TABLET ORAL EVERY 6 HOURS PRN
Qty: 60 TABLET | Refills: 0 | Status: SHIPPED | OUTPATIENT
Start: 2018-09-20 | End: 2018-10-16 | Stop reason: SDUPTHER

## 2018-09-20 RX ADMIN — HEPARIN SODIUM 5000 UNITS: 5000 INJECTION, SOLUTION INTRAVENOUS; SUBCUTANEOUS at 05:09

## 2018-09-20 RX ADMIN — CEFAZOLIN 2 G: 330 INJECTION, POWDER, FOR SOLUTION INTRAMUSCULAR; INTRAVENOUS at 12:09

## 2018-09-20 RX ADMIN — METOPROLOL TARTRATE 25 MG: 25 TABLET ORAL at 10:09

## 2018-09-20 RX ADMIN — ACETAMINOPHEN 650 MG: 325 TABLET ORAL at 03:09

## 2018-09-20 RX ADMIN — OXYCODONE AND ACETAMINOPHEN 1 TABLET: 7.5; 325 TABLET ORAL at 10:09

## 2018-09-20 RX ADMIN — TOPIRAMATE 100 MG: 25 TABLET, FILM COATED ORAL at 10:09

## 2018-09-20 RX ADMIN — CEFAZOLIN 2 G: 330 INJECTION, POWDER, FOR SOLUTION INTRAMUSCULAR; INTRAVENOUS at 05:09

## 2018-09-20 RX ADMIN — BACITRACIN: 500 OINTMENT TOPICAL at 10:09

## 2018-09-20 RX ADMIN — OXYCODONE AND ACETAMINOPHEN 1 TABLET: 7.5; 325 TABLET ORAL at 03:09

## 2018-09-20 NOTE — PLAN OF CARE
SAVANNA following for DC needs. SAVANNA in communication with CM.    SAVAN[facility] HH [facility] [place] [facility]. Patient has [employer] [facility] [employer].     Leslie Estrada LMSW  [facility]  [id number]

## 2018-09-20 NOTE — PLAN OF CARE
Problem: Patient Care Overview  Goal: Plan of Care Review  Outcome: Ongoing (interventions implemented as appropriate)  No acute events or changes overnight. Patient medicated w/PRN analgesic once w/moderate relief.     Problem: Fall Risk (Adult)  Goal: Identify Related Risk Factors and Signs and Symptoms  Related risk factors and signs and symptoms are identified upon initiation of Human Response Clinical Practice Guideline (CPG)  Outcome: Ongoing (interventions implemented as appropriate)  Patient remains free from falls throughout the shift.

## 2018-09-20 NOTE — DISCHARGE INSTRUCTIONS
Please follow ONLY the instructions that are checked below.    Activity Restrictions:  [x]  Return to work will be determined on an individual basis.  [x]  No lifting greater than 10 pounds.  [x]  Avoid bending and twisting the area of your surgery more than 45 degrees from neutral position in any direction.  [x]  No driving or operating machinery:  [x]  until cleared by your surgeon.  [x]  while taking narcotic pain medications or muscle relaxants.  []  No cervical collar, soft collar, or lumbar brace required.  []  Wear your brace at all times. You may be given an extra brace or soft collar to wear when showering.  [x]  Wear your brace at all times except when flat in bed.  []  Wear brace for comfort only.  [x]  Increase ambulation over the next 2 weeks so that you are walking 2 miles per day at 2 weeks post-operatively.  [x]  Walk on paved surfaces only. It is okay to walk up and down stairs while holding onto a side rail.  [x]  No sexual activity for 2-3 weeks.    Discharge Medication/Follow-up:  [x]  Please refer to discharge medication reconciliation form.  [x]  Do not take ANY non-steroidal anti-inflammatory drugs (NSAIDS), including the following: ibuprofen, naprosyn, Aleve, Advil, Indocin, Mobic, or Celebrex for:  []  4 weeks  [x]  8 weeks  []  6 months  [x]  Prescriptions for appropriate medication will be given upon discharge.   [x]  Pain control:             [x]  Muscle relaxer:            [x]  Take docusate (Colace 100 mg): take one capsule a day as needed for constipation. You can get this over the counter.  [x]  Follow-up appointment:  [x]  10-14 days post-op for wound check by physician assistant/nurse  [x]  4-6 weeks with MD:  [x]  with x-rays  []  without x-rays  []  An appointment will be mailed to you.    Wound Care:  []  Remove dressing or bandaid in    days.  [x]  No bandage required. Keep your incision open to the air.  [x]  You may shower on the 2nd day after your surgery. Have the force of  water hit you opposite from the incision. Pat the incision dry after your shower; do not scrub the incision.  [x]  You cannot take a bath until 8 weeks after surgery.  [x]  Apply bacitracin to incision twice a day for  12  more days.    Call your doctor or go to the Emergency Room for any signs of infection, including: increased redness, drainage, pain, or fever (temperature ?101.5 for 24 hours). Call your doctor or go to the Emergency Room if there are any localized neurological changes; problems with speech, vision, numbness, tingling, weakness, or severe headache; or for other concerns.    Special Instructions:  [x]  No use of tobacco products.  [x]  Diet: Please eat a regular diet as tolerated.  []  Other diet:              Specific physician instructions:           Physicians need 3 days' notice for pain medicine to be refilled. Pain medicine will only be refilled between 8 AM and 5 PM, Monday through Friday, due to Food and Drug Administration regulation of documentation.    If you have any questions about this form, please call [phone].    Form No. 01321 (Revised 10/31/2013)

## 2018-09-20 NOTE — PLAN OF CARE
Patient to be discharged home.  The patient will have home health upon discharge which will be set up per .    Patient will be provided with a transfer tub bench.  Family to provide transportation.  Neurosurgery clinic to schedule follow up appointment.    Future Appointments   Date Time Provider Department Center   10/1/2018 10:20 AM Katy Spicer RN [facility] NEUROS[address]   10/9/2018  6:55 AM [facility] LAB [facility]   10/16/2018  9:00 AM Chery Lopez MD [facility] INFECT [facility]   10/31/2018  9:00 AM [facility] OI EOS [facility] EOS [facility]   10/31/2018 10:45 AM Noel Bernal MD [facility] NEUROS[address]   1/30/2019  8:00 AM ORTHO SARAI, YENI [facility] ORTHO [facility]        09/20/18 1108   Final Note   Assessment Type Final Discharge Note   Discharge Disposition Home-Health   Hospital Follow Up  Appt(s) scheduled? (Neurosurgery clinic to schedule follow up appointment.)   Discharge plans and expectations educations in teach back method with documentation complete? Yes

## 2018-09-20 NOTE — DISCHARGE SUMMARY
[facility]  Neurosurgery  Discharge Summary      Patient Name: Rosa Fermin  MRN: [id number]  Admission Date: 9/17/2018  Hospital Length of Stay: 3 days  Discharge Date and Time:  09/20/2018 6:04 PM  Attending Physician: No att. providers found   Discharging Provider: Joselyn Bernal PA-C  Primary Care Provider: Chery Lopez MD    HPI:   The patient is a 63 y.o. female who was referred to me by Dr. Tapia. The pt complains of acute on chronic low back pain.  She has a long hx of back pain and was followed by an outside facility for her back pain for many years which was well-maintained with medication and TODD's. However, her back pain has been progressing since December but has significantly worsened within the last two months. She notes pain in her bilateral lower pain with intermittent radiation into the RLE. She states the pain has limited her physical activities and is exacerbated when sitting or laying supine. She has not been able to sleep on her bed because the pain is intolerable and is now sleeping on her couch. She rates the pain a 8-9/10 without pain medication and 3/10 with medication. She endorses BLE weakness which is worse on the left, secondary to back pain and chronic knee pain. She has a hx of left knee surgery. She works as a  in the setting of outpatient clinics and hospitals. The pt reports no hx of cigarette use. Pt has a hx of HTN but no cardiac hx.        Procedure(s) (LRB):  FUSION, SPINE, LUMBAR, TLIF, MINIMALLY INVASIVE @ L4-L5 & L5-S1 (N/A)     Hospital Course: 9/17: OR for minimally invasive lumbar fusion at L4/5 and L5/S1. Transferred to PACU post-op. Stepped down to floor last night. Unable to obtain postop XR secondary to pain.  9/18: overnight febrile one time at 101.6. Pt denied distress. Temp WNL after second dose Tylenol. Tachycardia ~114-120. -170s. CXR negative. UA negative.   9/19: febrile this am at 101.5. Tachycardia 127.  SBP 140s. Post op xray done today.   9/20: febrile overnight once at 100.9. Pulse . No acute distress. Improved weakness and minimal low back pain.     Plan:  Pt is a 64 yo F with h/o lumbar spondylolisthesis. POD#2 s/p minimally invasive lumbar fusion at L4-L5 & L5-S1.    - Neurologically stable  - Febrile once overnight at 100.9. Afebrile since then. Ordered IS at bedside and encouraged use every hour while awake. Duonebs q4h while awake. CXR negative. US bilateral LE negative. Blood cultures show no growth to date. UA negative. Stable.    - HTN: SBP <160. Continue home BP meds, losartan and furosemide. Continue Metoprolol 25 BID. Pt reports home SBP runs around 149.   - Tachycardia: improved. . Pt showing no signs of distress on exam. Continue Metoprolol 25 BID.   - Pain: controlled with current pain regimen.   - Incision: Bacitracin to incision BID. Discussed proper wound care.   - Post op XR lumbar spine: postoperative findings satisfactory    - Brace: LSO brace to be worn when out of bed or sitting on side of bed. Can remove when lying in bed.   - Activity: Continue PT/OT. OOB > 6hrs/day  - Diet: tolerating PO  - Voiding: Voiding spontaneously.   - Continue current bowel regimen, senna and miralax.   - DVT prophylaxis: Heparin, Compression stockings, SCDs in place  - IS at bedside. Encourage use every hour while awake.     Dispo: PT/OT recommending to discharge home with home health. Discharge home with home health today.       Consults: none    Significant Diagnostic Studies: Labs:   BMP:   Recent Labs   Lab  09/19/18   0948   GLU  145*   NA  139   K  3.4*   CL  115*   CO2  18*   BUN  7*   CREATININE  0.7   CALCIUM  9.0   , CMP   Recent Labs   Lab  09/19/18   0948   NA  139   K  3.4*   CL  115*   CO2  18*   GLU  145*   BUN  7*   CREATININE  0.7   CALCIUM  9.0   ANIONGAP  6*   ESTGFRAFRICA  >60.0   EGFRNONAA  >60.0   , CBC   Recent Labs   Lab  09/19/18   0948   WBC  7.96   HGB  10.9*   HCT  32.6*  "  PLT  256   , INR   Lab Results   Component Value Date    INR 1.0 09/17/2018    INR 0.9 09/05/2018    INR 1.6 (H) 12/17/2015    and All labs within the past 24 hours have been reviewed  Microbiology:   Blood Culture   Lab Results   Component Value Date    LABBLOO No Growth to date 09/18/2018    LABBLOO No Growth to date 09/18/2018    and Urine Culture    Lab Results   Component Value Date    LABURIN  09/18/2018     GRAM NEGATIVE HOLLI  10,000 - 49,999 cfu/ml  Identification and susceptibility pending       US LE: negative for DVT.  Radiology: X-Ray Lumbar spine satisfactory postoperative alignment. CXR shows no acute cardiopulmonary abnormalities.       Pending Diagnostic Studies:     None        Final Active Diagnoses:    Diagnosis Date Noted POA    PRINCIPAL PROBLEM:  Spondylolisthesis, lumbar region [M43.16] 09/17/2018 Yes      Problems Resolved During this Admission:      Discharged Condition: good    Disposition: Home-Health Care [facility]    Follow Up:    Patient Instructions:      BATH/SHOWER CHAIR FOR HOME USE     Order Specific Question Answer Comments   Height: 5' 3" (1.6 m)    Weight: 113.8 kg (250 lb 14.1 oz)    Does patient have medical equipment at home? cane, straight    Does patient have medical equipment at home? rollator    Length of need (1-99 months): 12    Type: With back      TRANSFER TUB BENCH FOR HOME USE     Order Specific Question Answer Comments   Type of Transfer Tub Bench: Unpadded    Height: 5' 3" (1.6 m)    Weight: 113.8 kg (250 lb 14.1 oz)    Does patient have medical equipment at home? cane, straight    Does patient have medical equipment at home? rollator    Length of need (1-99 months): 99      Ambulatory referral to Home Health   Referral Priority: Routine Referral Type: Home Health   Referral Reason: Specialty Services Required   Requested Specialty: Home Health Services   Number of Visits Requested: 1     Medications:  Reconciled Home Medications:      Medication List      START " taking these medications    oxyCODONE-acetaminophen 7.5-325 mg per tablet  Commonly known as:  PERCOCET  Take 1 tablet by mouth every 6 (six) hours as needed (pain).        STOP taking these medications    acetaminophen 650 MG Tbsr  Commonly known as:  TYLENOL     diclofenac sodium 1 % Gel  Commonly known as:  VOLTAREN     ergocalciferol 50,000 unit Cap  Commonly known as:  ERGOCALCIFEROL     furosemide 20 MG tablet  Commonly known as:  LASIX     glucosamine sulfate 750 mg Tab     HYDROcodone-acetaminophen  mg per tablet  Commonly known as:  NORCO     losartan 100 MG tablet  Commonly known as:  COZAAR     potassium chloride SA 10 MEQ tablet  Commonly known as:  K-DUR,KLOR-CON     ranitidine 150 MG tablet  Commonly known as:  ZANTAC     tiZANidine 4 MG tablet  Commonly known as:  ZANAFLEX     topiramate 100 MG tablet  Commonly known as:  TOPAMAX     traMADol 50 mg tablet  Commonly known as:  ROXIE Bernal PA-C  Neurosurgery  [facility]

## 2018-09-20 NOTE — ASSESSMENT & PLAN NOTE
Pt is a 62 yo F with h/o lumbar spondylolisthesis. POD#2 s/p minimally invasive lumbar fusion at L4-L5 & L5-S1.    - Neurologically stable  - Febrile once overnight at 100.9. Afebrile since then. Ordered IS at bedside and encouraged use every hour while awake. Duonebs q4h while awake. CXR negative. US bilateral LE negative. Blood cultures show no growth to date. UA negative. Stable.    - HTN: SBP <160. Continue home BP meds, losartan and furosemide. Continue Metoprolol 25 BID. Pt reports home SBP runs around 149. Will continue to monitor.  - Tachycardia: improved. . Pt showing no signs of distress on exam. Continue Metoprolol 25 BID.   - Pain: controlled with current pain regimen.   - Incision: Bacitracin to incision BID. Discussed proper wound care.   - Post op XR lumbar spine: postoperative findings satisfactory    - Brace: LSO brace to be worn when out of bed or sitting on side of bed. Can remove when lying in bed.   - Activity: Continue PT/OT. OOB > 6hrs/day  - Diet: tolerating PO  - Voiding: Voiding spontaneously.   - Continue current bowel regimen, senna and miralax.   - DVT prophylaxis: Heparin, Compression stockings, SCDs in place  - IS at bedside. Encourage use every hour while awake.       Dispo: PT/OT recommending to discharge home with home health. Discharge home with home health today.

## 2018-09-21 DIAGNOSIS — I10 ESSENTIAL HYPERTENSION: Primary | ICD-10-CM

## 2018-09-21 LAB — BACTERIA UR CULT: NORMAL

## 2018-09-21 RX ORDER — METOPROLOL SUCCINATE 25 MG/1
25 TABLET, EXTENDED RELEASE ORAL 2 TIMES DAILY
Qty: 60 TABLET | Refills: 11 | Status: SHIPPED | OUTPATIENT
Start: 2018-09-21 | End: 2018-10-02 | Stop reason: SDUPTHER

## 2018-09-21 NOTE — TELEPHONE ENCOUNTER
Spoke with pt and explained that she would only be able to get 1st 7 days of her Percocet filled [facility], but that Neurosurgery department is more than willing to fulfill her entire Rx in batches of 2-3 1 week prescriptions at a time (1 active, others postdated) that she would have to  in at the clinic periodically. Pt agreeable to this plan; I will notify Marguerite Vicente PA-C that pt will need Rx for  next week.    I also informed pt that Marguerite wants pt to continue her regular home medication regimens of her Lasix, Losartan, Potassium Chloride tablets, Zantac, and Topamax as well as her Zanaflex as needed. She is not to take her Tramadol.     I also informed her that I have called in Rx for Metoprolol succinate to her pharmacy to take in addition to her other medications, and stressed that she needs to follow-up with her PCP for appropriate management of her blood pressure. Pt v/u.

## 2018-09-21 NOTE — TELEPHONE ENCOUNTER
[facility] pharmacist explains that pt's Percocet Rx was on hold as pt had recent Rx for Norco 5-325 mg. Explained that this new Rx for post-operative pain and not for long-term use. Explained pt is not currently taking Norco. Pt will be able to fill Percocet Rx now that pharmacy has been informed of pt's surgery and current medication regimen, but will only be given enough doses for the 1st 7 days of her regimen with the entire remainder of the Rx becoming void. She explains that ptmay  decline this option and attempt to get full Rx filled, but advises that this is usually an insurance issue. I will relay this information to the pt.    I also called in Metroprolol Succinate 25 mg BID, 60 tablets, 1 refill for pt to take in addition to her prescribed blood pressure medications as specifically directed by Marguerite Vicente PA-C.

## 2018-09-21 NOTE — TELEPHONE ENCOUNTER
Called Mary w/ [facility] to inform her that pt was not supposed to taken off of all of her regular medications at discharge and that we are in the process of making sure pt is placed on proper medication regimen. Mary also reports that pt has not been able to get Rx for her Percocet 7.5 mg filled at her [facility]. I will discuss the case w/ Marguerite Vicente PA-C and call [facility] to gather more information on the problem pt is having with filling her Rx.

## 2018-09-21 NOTE — TELEPHONE ENCOUNTER
----- Message from Melissa Hendricks sent at 9/21/2018  1:42 PM CDT -----  Contact: Mary    [facility]    [phone] or Pt @ [phone]  Pt had surgery and when discharged from the hospital they discontinued all of her medication instead of the pain medication.  Pt would like to know why she was taken off of all of her regular medications.  Pls call.

## 2018-09-23 LAB — BACTERIA BLD CULT: NORMAL

## 2018-09-29 ENCOUNTER — TELEPHONE (OUTPATIENT)
Dept: ADMINISTRATIVE | Facility: CLINIC | Age: 63
End: 2018-09-29

## 2018-10-01 ENCOUNTER — CLINICAL SUPPORT (OUTPATIENT)
Dept: NEUROSURGERY | Facility: CLINIC | Age: 63
End: 2018-10-01
Payer: COMMERCIAL

## 2018-10-01 VITALS
BODY MASS INDEX: 40.44 KG/M2 | HEART RATE: 106 BPM | DIASTOLIC BLOOD PRESSURE: 53 MMHG | SYSTOLIC BLOOD PRESSURE: 114 MMHG | WEIGHT: 228.31 LBS | TEMPERATURE: 100 F

## 2018-10-01 DIAGNOSIS — M43.16 SPONDYLOLISTHESIS, LUMBAR REGION: Primary | ICD-10-CM

## 2018-10-01 DIAGNOSIS — Z98.1 S/P LUMBAR FUSION: Primary | ICD-10-CM

## 2018-10-01 PROCEDURE — 99999 PR PBB SHADOW E&M-EST. PATIENT-LVL III: CPT | Mod: PBBFAC,,,

## 2018-10-01 RX ORDER — OXYCODONE AND ACETAMINOPHEN 5; 325 MG/1; MG/1
1 TABLET ORAL EVERY 6 HOURS PRN
Qty: 60 TABLET | Refills: 0 | Status: ON HOLD | OUTPATIENT
Start: 2018-10-01 | End: 2018-10-09

## 2018-10-01 NOTE — PT/OT/SLP DISCHARGE
Physical Therapy Discharge Summary    Name: Rosa Fermin  MRN: [id number]   Principal Problem: Spondylolisthesis, lumbar region     Patient Discharged from acute Physical Therapy on 18.  Please refer to prior PT noted date on 18 for functional status.     Assessment:     Patient has not met goals.    Objective:     GOALS:   Multidisciplinary Problems     Physical Therapy Goals     Not on file          Multidisciplinary Problems (Resolved)        Problem: Physical Therapy Goal    Goal Priority Disciplines Outcome Goal Variances Interventions   Physical Therapy Goal   (Resolved)     PT, PT/OT Outcome(s) achieved     Description:  Goals to be met by: 2018    Patient will increase functional independence with mobility by performin. Supine to sit with Modified [place]  2. Sit to supine with Modified [place]  3. Sit to stand transfer with Supervision using appropriate AD.   4. Gait  x 150 feet with Supervision using Rolling Walker or rollator.   5. Stand for 3 minutes with Supervision.   6. Lower extremity exercise program x15 reps per handout, with supervision                      Reasons for Discontinuation of Therapy Services  Transfer to alternate level of care.      Plan:     Patient Discharged to: Home with Home Health Service.    Radha Pham, PT  10/1/2018

## 2018-10-01 NOTE — PROGRESS NOTES
Wound Check   Neurosurgery      Ms Rosa Fermin is a very pleasant 63 year old female s/p MIS TLIF L4-5, L5-S1 on 9/17/18 by Dr. Bernal who presents to clinic for her 2 week follow up. (For complete diagnosis and procedure, see OP note.) Pt is in NAD, using rolling walker, denies any fever, chills, night sweats. Pt is wearing her brace and endorses using it as instructed. Rationale for brace discussed with pt.    Pre-operatively, she had increasingly worse back pain and pain & tingling down legs to ankle, Right > Left. She was not able to sleep in her bed. Post-operatively, patient reports being able to sleep in her bed, her ankle pain is gone. She reports pain in left leg that is unrelieved by pain meds. Pt is not able to take gabapentin 2/2 swelling. Normal healing following nerve root impingement discussed.    Discussed medications. Pt is taking her pain meds with some regularity. Discussed tapering. Pt given refill for 5-325.    Incisions x 4 on back appear to be healing well, with the left lower incision showing signs of slough and healing by 2nd intention. The other 3 are CDI with minimal erythema. Staples intact.     Incisions swabbed with betadine and removed. Staples left in left lower incision. Pt given a tube of Medihoney and instructed to use it BID, then cover loosely with gauze.      Pt given written instructions which include:  - Keep incision open to air.   - Wear brace when out of bed.  - No need to put bacitracin ointment on wound   - Can shower and get incision wet, just pat dry and no vigorous scrubbing. Do not submerge incision for another 4 weeks.   - No lifting more than 10 lbs or excessive bending/twisting.   - Follow up in clinic in 4 weeks with x-rays  - Encouraged patient to call if they have any questions or concerns prior to next follow up appt.    Katy Spicer RN  Neurosurgery

## 2018-10-08 ENCOUNTER — HOSPITAL ENCOUNTER (INPATIENT)
Facility: HOSPITAL | Age: 63
LOS: 2 days | Discharge: HOME HEALTH | DRG: 176 | End: 2018-10-10
Attending: EMERGENCY MEDICINE | Admitting: INTERNAL MEDICINE
Payer: COMMERCIAL

## 2018-10-08 DIAGNOSIS — I26.99 PULMONARY EMBOLI: ICD-10-CM

## 2018-10-08 DIAGNOSIS — I26.99 PULMONARY EMBOLISM: ICD-10-CM

## 2018-10-08 DIAGNOSIS — I26.02 ACUTE SADDLE PULMONARY EMBOLISM WITH ACUTE COR PULMONALE: Primary | ICD-10-CM

## 2018-10-08 DIAGNOSIS — I26.92 ACUTE SADDLE PULMONARY EMBOLISM WITHOUT ACUTE COR PULMONALE: ICD-10-CM

## 2018-10-08 DIAGNOSIS — Z86.718 HISTORY OF DVT (DEEP VEIN THROMBOSIS): ICD-10-CM

## 2018-10-08 DIAGNOSIS — I26.99 PE (PULMONARY THROMBOEMBOLISM): ICD-10-CM

## 2018-10-08 DIAGNOSIS — I10 ESSENTIAL (PRIMARY) HYPERTENSION: ICD-10-CM

## 2018-10-08 DIAGNOSIS — I26.92 SADDLE EMBOLUS OF PULMONARY ARTERY WITHOUT ACUTE COR PULMONALE: ICD-10-CM

## 2018-10-08 DIAGNOSIS — M47.816 LUMBAR SPONDYLOSIS: ICD-10-CM

## 2018-10-08 LAB
ABO + RH BLD: NORMAL
ALBUMIN SERPL BCP-MCNC: 3.8 G/DL
ALLENS TEST: ABNORMAL
ALP SERPL-CCNC: 138 U/L
ALT SERPL W/O P-5'-P-CCNC: 32 U/L
ANION GAP SERPL CALC-SCNC: 16 MMOL/L
APTT BLDCRRT: 75.4 SEC
APTT BLDCRRT: <21 SEC
AST SERPL-CCNC: 34 U/L
BASOPHILS # BLD AUTO: 0.02 K/UL
BASOPHILS # BLD AUTO: 0.04 K/UL
BASOPHILS NFR BLD: 0.3 %
BASOPHILS NFR BLD: 0.6 %
BILIRUB SERPL-MCNC: 0.5 MG/DL
BLD GP AB SCN CELLS X3 SERPL QL: NORMAL
BNP SERPL-MCNC: 115 PG/ML
BUN SERPL-MCNC: 11 MG/DL
BUN SERPL-MCNC: 11 MG/DL (ref 6–30)
CALCIUM SERPL-MCNC: 9.9 MG/DL
CHLORIDE SERPL-SCNC: 112 MMOL/L (ref 95–110)
CHLORIDE SERPL-SCNC: 114 MMOL/L
CO2 SERPL-SCNC: 15 MMOL/L
CREAT SERPL-MCNC: 0.8 MG/DL (ref 0.5–1.4)
CREAT SERPL-MCNC: 1 MG/DL
DELSYS: ABNORMAL
DIASTOLIC DYSFUNCTION: NO
DIFFERENTIAL METHOD: ABNORMAL
DIFFERENTIAL METHOD: ABNORMAL
EOSINOPHIL # BLD AUTO: 0 K/UL
EOSINOPHIL # BLD AUTO: 0.2 K/UL
EOSINOPHIL NFR BLD: 0.3 %
EOSINOPHIL NFR BLD: 2.4 %
ERYTHROCYTE [DISTWIDTH] IN BLOOD BY AUTOMATED COUNT: 16.4 %
ERYTHROCYTE [DISTWIDTH] IN BLOOD BY AUTOMATED COUNT: 16.6 %
EST. GFR  (AFRICAN AMERICAN): >60 ML/MIN/1.73 M^2
EST. GFR  (NON AFRICAN AMERICAN): >60 ML/MIN/1.73 M^2
ESTIMATED PA SYSTOLIC PRESSURE: 40.21
FIBRINOGEN PPP-MCNC: 319 MG/DL
FIO2: 55
FLOW: 14
GLUCOSE SERPL-MCNC: 227 MG/DL
GLUCOSE SERPL-MCNC: 262 MG/DL (ref 70–110)
HCO3 UR-SCNC: 17.7 MMOL/L (ref 24–28)
HCT VFR BLD AUTO: 32.1 %
HCT VFR BLD AUTO: 37.8 %
HCT VFR BLD CALC: 33 %PCV (ref 36–54)
HGB BLD-MCNC: 11.6 G/DL
HGB BLD-MCNC: 9.9 G/DL
IMM GRANULOCYTES # BLD AUTO: 0.01 K/UL
IMM GRANULOCYTES # BLD AUTO: 0.02 K/UL
IMM GRANULOCYTES NFR BLD AUTO: 0.1 %
IMM GRANULOCYTES NFR BLD AUTO: 0.3 %
INR PPP: 1
INR PPP: 1.1
LYMPHOCYTES # BLD AUTO: 1.5 K/UL
LYMPHOCYTES # BLD AUTO: 3.4 K/UL
LYMPHOCYTES NFR BLD: 23.8 %
LYMPHOCYTES NFR BLD: 50.4 %
MCH RBC QN AUTO: 27.4 PG
MCH RBC QN AUTO: 27.6 PG
MCHC RBC AUTO-ENTMCNC: 30.7 G/DL
MCHC RBC AUTO-ENTMCNC: 30.8 G/DL
MCV RBC AUTO: 89 FL
MCV RBC AUTO: 89 FL
MODE: ABNORMAL
MONOCYTES # BLD AUTO: 0.4 K/UL
MONOCYTES # BLD AUTO: 0.6 K/UL
MONOCYTES NFR BLD: 6.2 %
MONOCYTES NFR BLD: 8.2 %
NEUTROPHILS # BLD AUTO: 2.6 K/UL
NEUTROPHILS # BLD AUTO: 4.2 K/UL
NEUTROPHILS NFR BLD: 38.3 %
NEUTROPHILS NFR BLD: 69.1 %
NRBC BLD-RTO: 0 /100 WBC
NRBC BLD-RTO: 0 /100 WBC
PCO2 BLDA: 42.4 MMHG (ref 35–45)
PH SMN: 7.23 [PH] (ref 7.35–7.45)
PLATELET # BLD AUTO: 200 K/UL
PLATELET # BLD AUTO: 273 K/UL
PMV BLD AUTO: 9 FL
PMV BLD AUTO: 9 FL
PO2 BLDA: 13 MMHG (ref 40–60)
POC BE: -10 MMOL/L
POC IONIZED CALCIUM: 1.14 MMOL/L (ref 1.06–1.42)
POC SATURATED O2: 12 % (ref 95–100)
POC TCO2 (MEASURED): 17 MMOL/L (ref 23–29)
POC TCO2: 19 MMOL/L (ref 24–29)
POTASSIUM BLD-SCNC: 3.7 MMOL/L (ref 3.5–5.1)
POTASSIUM SERPL-SCNC: 3.5 MMOL/L
PROT SERPL-MCNC: 7.7 G/DL
PROTHROMBIN TIME: 10.8 SEC
PROTHROMBIN TIME: 11.4 SEC
RBC # BLD AUTO: 3.59 M/UL
RBC # BLD AUTO: 4.23 M/UL
RETIRED EF AND QEF - SEE NOTES: 73 (ref 55–65)
SAMPLE: ABNORMAL
SAMPLE: ABNORMAL
SITE: ABNORMAL
SODIUM BLD-SCNC: 143 MMOL/L (ref 136–145)
SODIUM SERPL-SCNC: 145 MMOL/L
SP02: 100
TRICUSPID VALVE REGURGITATION: ABNORMAL
TROPONIN I SERPL DL<=0.01 NG/ML-MCNC: 0.01 NG/ML
TROPONIN I SERPL DL<=0.01 NG/ML-MCNC: 1.17 NG/ML
WBC # BLD AUTO: 6.1 K/UL
WBC # BLD AUTO: 6.79 K/UL

## 2018-10-08 PROCEDURE — 99223 1ST HOSP IP/OBS HIGH 75: CPT | Mod: ,,, | Performed by: INTERNAL MEDICINE

## 2018-10-08 PROCEDURE — 96365 THER/PROPH/DIAG IV INF INIT: CPT

## 2018-10-08 PROCEDURE — 93308 TTE F-UP OR LMTD: CPT | Mod: 26,,, | Performed by: INTERNAL MEDICINE

## 2018-10-08 PROCEDURE — 99291 CRITICAL CARE FIRST HOUR: CPT | Mod: 25

## 2018-10-08 PROCEDURE — 96366 THER/PROPH/DIAG IV INF ADDON: CPT

## 2018-10-08 PROCEDURE — 85610 PROTHROMBIN TIME: CPT | Mod: 91

## 2018-10-08 PROCEDURE — 86901 BLOOD TYPING SEROLOGIC RH(D): CPT

## 2018-10-08 PROCEDURE — 25000003 PHARM REV CODE 250

## 2018-10-08 PROCEDURE — 02HP33Z INSERTION OF INFUSION DEVICE INTO PULMONARY TRUNK, PERCUTANEOUS APPROACH: ICD-10-PCS | Performed by: INTERNAL MEDICINE

## 2018-10-08 PROCEDURE — 85025 COMPLETE CBC W/AUTO DIFF WBC: CPT

## 2018-10-08 PROCEDURE — 20000000 HC ICU ROOM

## 2018-10-08 PROCEDURE — 85730 THROMBOPLASTIN TIME PARTIAL: CPT | Mod: 91

## 2018-10-08 PROCEDURE — 27000221 HC OXYGEN, UP TO 24 HOURS

## 2018-10-08 PROCEDURE — 63600175 PHARM REV CODE 636 W HCPCS: Mod: JG

## 2018-10-08 PROCEDURE — 25500020 PHARM REV CODE 255: Performed by: EMERGENCY MEDICINE

## 2018-10-08 PROCEDURE — 63600175 PHARM REV CODE 636 W HCPCS: Performed by: EMERGENCY MEDICINE

## 2018-10-08 PROCEDURE — 93308 TTE F-UP OR LMTD: CPT

## 2018-10-08 PROCEDURE — 27200085 CATH LAB PROCEDURE

## 2018-10-08 PROCEDURE — 85384 FIBRINOGEN ACTIVITY: CPT

## 2018-10-08 PROCEDURE — 83880 ASSAY OF NATRIURETIC PEPTIDE: CPT

## 2018-10-08 PROCEDURE — 80053 COMPREHEN METABOLIC PANEL: CPT

## 2018-10-08 PROCEDURE — 99900035 HC TECH TIME PER 15 MIN (STAT)

## 2018-10-08 PROCEDURE — 85730 THROMBOPLASTIN TIME PARTIAL: CPT

## 2018-10-08 PROCEDURE — 25000003 PHARM REV CODE 250: Performed by: INTERNAL MEDICINE

## 2018-10-08 PROCEDURE — 3E06317 INTRODUCTION OF OTHER THROMBOLYTIC INTO CENTRAL ARTERY, PERCUTANEOUS APPROACH: ICD-10-PCS | Performed by: INTERNAL MEDICINE

## 2018-10-08 PROCEDURE — 85610 PROTHROMBIN TIME: CPT

## 2018-10-08 PROCEDURE — 93321 DOPPLER ECHO F-UP/LMTD STD: CPT | Mod: 26,,, | Performed by: INTERNAL MEDICINE

## 2018-10-08 PROCEDURE — 99291 CRITICAL CARE FIRST HOUR: CPT | Mod: ,,, | Performed by: EMERGENCY MEDICINE

## 2018-10-08 PROCEDURE — 84484 ASSAY OF TROPONIN QUANT: CPT | Mod: 91

## 2018-10-08 PROCEDURE — 82803 BLOOD GASES ANY COMBINATION: CPT

## 2018-10-08 PROCEDURE — 63600175 PHARM REV CODE 636 W HCPCS: Mod: JG | Performed by: INTERNAL MEDICINE

## 2018-10-08 PROCEDURE — 25000003 PHARM REV CODE 250: Performed by: STUDENT IN AN ORGANIZED HEALTH CARE EDUCATION/TRAINING PROGRAM

## 2018-10-08 PROCEDURE — 94761 N-INVAS EAR/PLS OXIMETRY MLT: CPT

## 2018-10-08 PROCEDURE — 37211 THROMBOLYTIC ART THERAPY: CPT | Mod: LT,,, | Performed by: INTERNAL MEDICINE

## 2018-10-08 PROCEDURE — 36013 PLACE CATHETER IN ARTERY: CPT | Mod: 51,,, | Performed by: INTERNAL MEDICINE

## 2018-10-08 PROCEDURE — 96375 TX/PRO/DX INJ NEW DRUG ADDON: CPT

## 2018-10-08 PROCEDURE — 63600175 PHARM REV CODE 636 W HCPCS: Performed by: INTERNAL MEDICINE

## 2018-10-08 RX ORDER — GLUCAGON 1 MG
1 KIT INJECTION
Status: DISCONTINUED | OUTPATIENT
Start: 2018-10-08 | End: 2018-10-10 | Stop reason: HOSPADM

## 2018-10-08 RX ORDER — SODIUM CHLORIDE 9 MG/ML
INJECTION, SOLUTION INTRAVENOUS CONTINUOUS
Status: DISCONTINUED | OUTPATIENT
Start: 2018-10-08 | End: 2018-10-09

## 2018-10-08 RX ORDER — SODIUM CHLORIDE 9 MG/ML
3 INJECTION, SOLUTION INTRAVENOUS CONTINUOUS
Status: DISCONTINUED | OUTPATIENT
Start: 2018-10-08 | End: 2018-10-08

## 2018-10-08 RX ORDER — HEPARIN SODIUM,PORCINE/D5W 25000/250
18 INTRAVENOUS SOLUTION INTRAVENOUS CONTINUOUS
Status: DISCONTINUED | OUTPATIENT
Start: 2018-10-08 | End: 2018-10-09

## 2018-10-08 RX ORDER — HEPARIN SODIUM,PORCINE/D5W 25000/250
18 INTRAVENOUS SOLUTION INTRAVENOUS CONTINUOUS
Status: DISCONTINUED | OUTPATIENT
Start: 2018-10-08 | End: 2018-10-08

## 2018-10-08 RX ORDER — DIPHENHYDRAMINE HYDROCHLORIDE 50 MG/ML
50 INJECTION INTRAMUSCULAR; INTRAVENOUS EVERY 6 HOURS
Status: COMPLETED | OUTPATIENT
Start: 2018-10-08 | End: 2018-10-09

## 2018-10-08 RX ORDER — ONDANSETRON 8 MG/1
8 TABLET, ORALLY DISINTEGRATING ORAL EVERY 8 HOURS PRN
Status: DISCONTINUED | OUTPATIENT
Start: 2018-10-08 | End: 2018-10-10 | Stop reason: HOSPADM

## 2018-10-08 RX ORDER — DIPHENHYDRAMINE HYDROCHLORIDE 50 MG/ML
50 INJECTION INTRAMUSCULAR; INTRAVENOUS EVERY 6 HOURS
Status: DISCONTINUED | OUTPATIENT
Start: 2018-10-08 | End: 2018-10-08

## 2018-10-08 RX ORDER — SODIUM CHLORIDE 0.9 % (FLUSH) 0.9 %
5 SYRINGE (ML) INJECTION
Status: DISCONTINUED | OUTPATIENT
Start: 2018-10-08 | End: 2018-10-10 | Stop reason: HOSPADM

## 2018-10-08 RX ORDER — IBUPROFEN 200 MG
24 TABLET ORAL
Status: DISCONTINUED | OUTPATIENT
Start: 2018-10-08 | End: 2018-10-10 | Stop reason: HOSPADM

## 2018-10-08 RX ORDER — AMOXICILLIN 250 MG
1 CAPSULE ORAL 2 TIMES DAILY
Status: DISCONTINUED | OUTPATIENT
Start: 2018-10-08 | End: 2018-10-10 | Stop reason: HOSPADM

## 2018-10-08 RX ORDER — IBUPROFEN 200 MG
16 TABLET ORAL
Status: DISCONTINUED | OUTPATIENT
Start: 2018-10-08 | End: 2018-10-10 | Stop reason: HOSPADM

## 2018-10-08 RX ORDER — ACETAMINOPHEN 325 MG/1
650 TABLET ORAL EVERY 4 HOURS PRN
Status: DISCONTINUED | OUTPATIENT
Start: 2018-10-08 | End: 2018-10-10 | Stop reason: HOSPADM

## 2018-10-08 RX ADMIN — ALTEPLASE 0.5 MG/HR: 2.2 INJECTION, POWDER, LYOPHILIZED, FOR SOLUTION INTRAVENOUS at 10:10

## 2018-10-08 RX ADMIN — SENNOSIDES AND DOCUSATE SODIUM 1 TABLET: 8.6; 5 TABLET ORAL at 08:10

## 2018-10-08 RX ADMIN — ACETAMINOPHEN 650 MG: 325 TABLET, FILM COATED ORAL at 06:10

## 2018-10-08 RX ADMIN — SODIUM CHLORIDE 3 ML/KG/HR: 0.9 INJECTION, SOLUTION INTRAVENOUS at 03:10

## 2018-10-08 RX ADMIN — IOHEXOL 100 ML: 350 INJECTION, SOLUTION INTRAVENOUS at 11:10

## 2018-10-08 RX ADMIN — HEPARIN SODIUM AND DEXTROSE 18 UNITS/KG/HR: 10000; 5 INJECTION INTRAVENOUS at 12:10

## 2018-10-08 RX ADMIN — SODIUM CHLORIDE: 0.9 INJECTION, SOLUTION INTRAVENOUS at 10:10

## 2018-10-08 RX ADMIN — DIPHENHYDRAMINE HYDROCHLORIDE 50 MG: 50 INJECTION, SOLUTION INTRAMUSCULAR; INTRAVENOUS at 03:10

## 2018-10-08 NOTE — SIGNIFICANT EVENT
Rapid Response Nurse Note     Rapid Response Metrics:     Admit Date: 10/8/2018  LOS: 0  Code Status: Prior   Date of Consult: 10/08/2018  : 1955  Age: 63 y.o.  Weight:   Wt Readings from Last 1 Encounters:   10/01/18 103.6 kg (228 lb 4.8 oz)     Race:   Sex: female  Bed: ED :   MRN: [id number]  Time Rapid Response Team page Received: 1034  Time Rapid Response Team at Bedside: 1039  Time Rapid Response Team left Bedside: 1049  Was the patient discharged from an ICU this admission? No   Was the patient discharged from a PACU within last 24 hours? No  Did the patient receive conscious sedation/general anesthesia within last 24 hours? No  Was the patient in the ED within the past 24 hours? No  Was the patient started on NIPPV within the past 24 hours? No  Did this progress into an ARC or CPA: No  Attending Physician: No att. providers found  Primary Service: Networked reference to record PCT   Consult Requested By: No att. providers found   Rapid Response Indication(s): SOB, Sats 84%, Diaphoretic  Disposition: Transferred to ED    SITUATION:     Reason for Call:   Called to evaluate the patient for Respiratory    BACKGROUND:     Why is the patient in the hospital?: Pt presented to neuro clinic for f/u sx.  What changed?: Pt became diaphoretic and short of breath after walking from parking garage to clinic.    ASSESSMENT:     What did you find: Upon arrival to clinic, pt noted to be diaphoretic, short of breath with Sats 84% on RA, . Pt placed on 6L NC, O2 sat increased to 93%. Pt transported to ED by Rapid Response RN without difficulty. Daughter at bedside.    RECOMMENDATIONS:     We recommend: More frequent vitals and closer monitoring    FOLLOW-UP/CONTINGENCY PLAN:     Patient needs a second visit at : N/A    Call the rapid response Nurse at x [phone] for additional questions or concerns.      PHYSICIAN ESCALATION:     Orders received and case discussed with N/A     Disposition: ED

## 2018-10-08 NOTE — ED NOTES
Hourly rounding complete. Patient resting in stretcher and is in NAD at this time. Pt is awake alert and oriented x4, VSS at this time, respirations even and unlabored at this time. Pt denies pain at this time. Pt updated on POC. Bed low and locked with side rails up x2, call bell in pt reach. Pt voices no needs at this time.

## 2018-10-08 NOTE — ED NOTES
Cardiology IR MD at bedside for procedure and blood consent forms, signed by MD patient and witness, left at bedside.

## 2018-10-08 NOTE — ASSESSMENT & PLAN NOTE
- Patient normally on furosemide 20 mg, losartan 100 mg, and metoprolol succinate 25 mg as outpatient  - Holding antihypertensive medications in setting of acute PE

## 2018-10-08 NOTE — ED NOTES
Patient identifiers verified and correct for Rosa Fermin.   LOC: The patient is awake, alert and aware of environment with an appropriate affect, the patient is oriented x 3 and speaking appropriately.   APPEARANCE: Patient appears comfortable and in no acute distress, patient is clean and well groomed.  SKIN: The skin is warm and dry, color consistent with ethnicity, patient has normal skin turgor and moist mucus membranes, skin intact, no breakdown or bruising noted.   MUSCULOSKELETAL: Patient moving all extremities spontaneously, no swelling noted. Pt has surgical incision site to the lumbar area  RESPIRATORY: Airway is open and patent, respirations are spontaneous, patient has a normal effort and rate, no accessory muscle use noted, pt placed on continuous pulse ox with O2 sats noted at 96% on ventimask at 50% 14L  CARDIAC: Pt placed on cardiac monitor. Patient has a tachy rate and regular rhythm, no edema noted, capillary refill < 3 seconds.   GASTRO: Soft and non tender to palpation, no distention noted, normoactive bowel sounds present in all four quadrants. Pt states bowel movements have been regular.  : Pt denies any pain or frequency with urination.  NEURO: Pt opens eyes spontaneously, behavior appropriate to situation, follows commands, facial expression symmetrical, bilateral hand grasp equal and even, purposeful motor response noted, normal sensation in all extremities when touched with a finger.

## 2018-10-08 NOTE — ASSESSMENT & PLAN NOTE
63 y.o. female with PMH of bilateral chronic knee pain, DVT in 1990s on coumadin in the past, HTN, chronic back pain s/p lumbar fusion on 9/17/18 by neurosx who presents to the ED with c/o acute SOB. CTA chest revealed saddle PE. Submassive PE based on improvement in BP, mild biochemical marker elevation (BNP), and RV strain. After discussing with interventional cardiology the plan is to take for catheter directed thrombolysis. Spoke with neurosurgery on call who said ok to proceed as benefit outweighs the risk.    Plan:  Catheter directed thrombolysis followed by CCU admission  - The risks, benefits & alternatives of the procedure were explained to the patient.    - The risks of catheter directed thrombolysis include but are not limited to:  Bleeding, infection, heart rhythm abnormalities, allergic reactions, and death.      - The risks of moderate sedation include hypotension, respiratory depression, arrhythmias, bronchospasm, & death.    - Informed consent was obtained & the patient is agreeable to proceed with the procedure.  - This patient was discussed with the attending interventional cardiologist who agrees with the above assessment & plan.

## 2018-10-08 NOTE — HPI
63 y.o. female with PMH of bilateral chronic knee pain, DVT in 1990s on coumadin in the past, HTN, chronic back pain s/p lumbar fusion on 9/17/18 by neurosx who presents to the ED with c/o acute SOB. In the ED noted to be hypoxic requiring 55% FIO2 via venti mask. Initially hypotensive (BP 91/73), P 123. EKG showed sinus tachycardia at 108 BPM with nonspecific ST changes. Tn negative. . CXR negative for acute disease. CTA chest notable for saddle PE. TTE at bedside showed evidence of RV strain, Orozco's sign, mid and apical RV hypokinesis with mildly depressed RV function, PASP 40, hyperdynamic LVEF (>73%).  Pt was walking from the parking garage to the hospital for a revaluation of her lumbar fusion Currently, she is not on any blood thinners. Denies any pain, leg pain, chest pain, cough, fever. Risk factors: Recent sx, immobility, prior hx of DVT in 1990s. Denies family hx of DVT/PE. Denies smoking cigarettes, alcohol, drugs. Denies iodine allergy.

## 2018-10-08 NOTE — ASSESSMENT & PLAN NOTE
- Patient has minor swelling on right leg when compared to left as well as some right leg pain.   - Ordered bilateral lower extremity venous ultrasound to investigate for DVT

## 2018-10-08 NOTE — PROCEDURES
"    Post Cath Note  Referring Physician: Jigar Orta MD  Procedure: THROMBECTOMY (Bilateral)       Access: Right CFV  6 fr sheath    See full report for further details    Intervention:   6Fr pigtail placed in main pulmonary artery for catheter directed thrombolysis TPA at 1mg/hr for at least 12 hours    Post Cath Exam:   /66 (BP Location: Right arm, Patient Position: Lying)   Pulse (!) 116   Temp 98.1 °F (36.7 °C) (Axillary)   Resp (!) 27   Ht 5' 3" (1.6 m)   Wt 104.1 kg (229 lb 8 oz)   SpO2 100%   BMI 40.65 kg/m²   No unusual pain, hematoma, thrill or bruit at vascular access site.  Distal pulse present without signs of ischemia.    Recommendations:   - Routine post-cath care  - neuro checks  - Will follow up in AM  - heparin gtt per protocol    Signed:  Tanvir Tejeda MD  Interevntional Cardiology   10/8/2018 6:44 PM  "

## 2018-10-08 NOTE — ED TRIAGE NOTES
Pt was walking from clinic and developed SOB/diaphoresis. Rapid response was called. Pt initially O2 sat in the 80's. Pt placed on 6L NC and O2 sats came up to 86%. Pt is alert and oriented. Pt was at clinic to have Lumbar fusion surgical site inspected

## 2018-10-08 NOTE — SUBJECTIVE & OBJECTIVE
Past Medical History:   Diagnosis Date    Atypical chest pain     Bilateral chronic knee pain     Carpal tunnel syndrome on both sides     Chronic lower back pain     Deep vein thrombosis     History of DVT (deep vein thrombosis)     left leg in the 90s    Hypertension     Osteoarthritis of both knees        Past Surgical History:   Procedure Laterality Date    COLONOSCOPY  2007    Dr Bolton    COLONOSCOPY N/A 12/11/2017    Procedure: COLONOSCOPY;  Surgeon: Shanelle Braga MD;  Location: [facility];  Service: Endoscopy;  Laterality: N/A;    COLONOSCOPY N/A 12/11/2017    Performed by Shanelle Braga MD at [facility] ENDO    ENDOMETRIAL ABLATION N/A 2003    FUSION, SPINE, LUMBAR, TLIF, MINIMALLY INVASIVE @ L4-L5 & L5-S1 N/A 9/17/2018    Performed by Noel Bernal MD at [facility] OR [address] [place] [address]    MINIMALLY INVASIVE TRANSFORAMINAL LUMBAR INTERBODY FUSION (TLIF) N/A 9/17/2018    Procedure: FUSION, SPINE, LUMBAR, TLIF, MINIMALLY INVASIVE @ L4-L5 & L5-S1;  Surgeon: Noel Bernal MD;  Location: [facility] OR [address] [place] [address];  Service: Neurosurgery;  Laterality: N/A;    REPLACEMENT-KNEE-TOTAL   Left 11/19/2015    Performed by Mitch Mujica MD at [facility] OR    SHOULDER ARTHROSCOPY W/ ROTATOR CUFF REPAIR Bilateral 2012, 2013    TOTAL KNEE ARTHROPLASTY Left        Review of patient's allergies indicates:   Allergen Reactions    Gabapentin Swelling    Vistaril [hydroxyzine hcl] Rash         (Not in a hospital admission)  Family History     Problem Relation (Age of Onset)    Arthritis Sister, Brother, Sister, Brother    Carpal tunnel syndrome Sister    Diabetes Mother, Brother, Brother, Brother, Brother    DIEGO disease Mother    Heart murmur Daughter    Hypertension Mother    Kidney disease Mother, Brother    No Known Problems Son, Maternal Grandmother, Maternal Grandfather, Paternal Grandmother, Paternal Grandfather        Tobacco Use    Smoking status: Never Smoker    Smokeless tobacco: Never Used   Substance and  Sexual Activity    Alcohol use: No     Alcohol/week: 0.0 oz    Drug use: No    Sexual activity: Yes     Partners: Male     ROS   Constitution: Negative for fever, chills, weight loss or gain.   HENT: Negative for sore throat, rhinorrhea, or headache.  Eyes: Negative for blurred or double vision.   Cardiovascular: See above  Pulmonary: Positive for SOB   Gastrointestinal: Negative for abdominal pain, nausea, vomiting, or diarrhea.   : Negative for dysuria.   Neurological: Negative for focal weakness or sensory changes.    Objective:     Vital Signs (Most Recent):  Temp: 98 °F (36.7 °C) (10/08/18 1054)  Pulse: (!) 126 (10/08/18 1248)  Resp: (!) 27 (10/08/18 1248)  BP: (!) 92/53 (10/08/18 1248)  SpO2: 96 % (10/08/18 1248) Vital Signs (24h Range):  Temp:  [98 °F (36.7 °C)] 98 °F (36.7 °C)  Pulse:  [115-130] 126  Resp:  [25-30] 27  SpO2:  [88 %-100 %] 96 %  BP: ()/(50-73) 92/53     Weight: 104.3 kg (230 lb)  Body mass index is 40.74 kg/m².    SpO2: 96 %  O2 Device (Oxygen Therapy): venti mask    No intake or output data in the 24 hours ending 10/08/18 1344    Lines/Drains/Airways     Peripheral Intravenous Line                 Peripheral IV - Single Lumen 10/08/18 1054 Right Antecubital less than 1 day         Peripheral IV - Single Lumen 10/08/18 1214 Left Antecubital less than 1 day                Physical Exam  Constitutional: NAD, conversant  HEENT: Sclera anicteric, PERRLA, EOMI  Neck: No JVD, no carotid bruits  CV: Regular rhythm, Tachycardic, no murmur, S1/S2 with loud P2 component, No Pericardial rub  Pulm: CTAB with no wheezes, rales, or rhonchi  GI: Abdomen soft, NTND, +BS  Extremities: No LE edema, warm and well perfused, No cyanosis, No clubbing  Skin: No ecchymosis, erythema, or ulcers  Psych: AOx3, appropriate affect  Neuro: CNII-XII intact, no focal deficits      Significant Labs:   ABG:   Recent Labs   Lab  10/08/18   1130   PH  7.228*   PCO2  42.4   HCO3  17.7*   POCSATURATED  12*   BE  -10    , CMP   Recent Labs   Lab  10/08/18   1118   NA  145   K  3.5   CL  114*   CO2  15*   GLU  227*   BUN  11   CREATININE  1.0   CALCIUM  9.9   PROT  7.7   ALBUMIN  3.8   BILITOT  0.5   ALKPHOS  138*   AST  34   ALT  32   ANIONGAP  16   ESTGFRAFRICA  >60.0   EGFRNONAA  >60.0   , CBC   Recent Labs   Lab  10/08/18   1118  10/08/18   1131   WBC  6.79   --    HGB  11.6*   --    HCT  37.8  33*   PLT  273   --    , INR   Recent Labs   Lab  10/08/18   1118   INR  1.0   , Troponin   Recent Labs   Lab  10/08/18   1118   TROPONINI  0.007    and All pertinent lab results from the last 24 hours have been reviewed.    Significant Imaging:   CTA Chest (10/8/18):  1. There are extensive pulmonary arterial filling defects involving the distal bilateral main pulmonary arteries extending to lobar, segmental, and subsegmental branches involving all lobes.  Additionally, there is a linear component of saddle embolus.  2. Multiple pulmonary nodules bilaterally.  A previously identified pulmonary nodule within the left lower lobe is not confidently identified on the current exam.  Follow-up recommendations are given for the largest nodule, the remainder can be followed on the same schedule.  For multiple solid nodules with any 6 mm or greater, Fleischner Society guidelines recommend follow up with non-contrast chest CT at 3-6 months and 18-24 months after discovery.  3. Cholelithiasis, and several additional findings above.    TTE (10/8/18):  CONCLUSIONS     1 - Hyperdynamic left ventricular systolic function (EF >70%).     2 - Mildly to moderately depressed right ventricular systolic function .     3 - The estimated PA systolic pressure is 40 mmHg.

## 2018-10-08 NOTE — CONSULTS
[facility]  Interventional Cardiology  Consult Note    Patient Name: Rosa Fermin  MRN: [id number]  Admission Date: 10/8/2018  Hospital Length of Stay: 0 days  Code Status: Prior   Attending Provider: Leonardo Bryson MD  Consulting Provider: Kishor Serrano MD  Primary Care Physician: Chery Lopez MD  Principal Problem:<principal problem not specified>    Patient information was obtained from patient and past medical records.     Inpatient consult to Interventional Cardiology  Consult performed by: Kishor Serrano MD  Consult ordered by: Kishor Serrano MD  Reason for consult: Saddle PE. Catheter directed thrombolysis.        Subjective:     Chief Complaint:  SOB     HPI:  63 y.o. female with PMH of bilateral chronic knee pain, DVT in 1990s on coumadin in the past, HTN, chronic back pain s/p lumbar fusion on 9/17/18 by neurosx who presents to the ED with c/o acute SOB. In the ED noted to be hypoxic requiring 55% FIO2 via venti mask. Initially hypotensive (BP 91/73), P 123. EKG showed sinus tachycardia at 108 BPM with nonspecific ST changes. Tn negative. . CXR negative for acute disease. CTA chest notable for saddle PE. TTE at bedside showed evidence of RV strain, Orozco's sign, mid and apical RV hypokinesis with mildly depressed RV function, PASP 40, hyperdynamic LVEF (>73%).  Pt was walking from the parking garage to the hospital for a revaluation of her lumbar fusion Currently, she is not on any blood thinners. Denies any pain, leg pain, chest pain, cough, fever. Risk factors: Recent sx, immobility, prior hx of DVT in 1990s. Denies family hx of DVT/PE. Denies smoking cigarettes, alcohol, drugs. Denies iodine allergy.             Past Medical History:   Diagnosis Date    Atypical chest pain     Bilateral chronic knee pain     Carpal tunnel syndrome on both sides     Chronic lower back pain     Deep vein thrombosis     History of DVT (deep vein thrombosis)      left leg in the 90s    Hypertension     Osteoarthritis of both knees        Past Surgical History:   Procedure Laterality Date    COLONOSCOPY  2007    Dr Bolton    COLONOSCOPY N/A 12/11/2017    Procedure: COLONOSCOPY;  Surgeon: Shanelle Braga MD;  Location: [facility];  Service: Endoscopy;  Laterality: N/A;    COLONOSCOPY N/A 12/11/2017    Performed by Shanelle [facility] MD at [facility] ENDO    ENDOMETRIAL ABLATION N/A 2003    FUSION, SPINE, LUMBAR, TLIF, MINIMALLY INVASIVE @ L4-L5 & L5-S1 N/A 9/17/2018    Performed by Noel Bernal MD at [facility] OR 2ND FLR    MINIMALLY INVASIVE TRANSFORAMINAL LUMBAR INTERBODY FUSION (TLIF) N/A 9/17/2018    Procedure: FUSION, SPINE, LUMBAR, TLIF, MINIMALLY INVASIVE @ L4-L5 & L5-S1;  Surgeon: Noel Bernal MD;  [facility];  Service: Neurosurgery;  Laterality: N/A;    REPLACEMENT-KNEE-TOTAL   Left 11/19/2015    Performed by Mitch M[facility]ica MD at [facility] OR    SHOULDER ARTHROSCOPY W/ ROTATOR CUFF REPAIR Bilateral 2012, 2013    TOTAL KNEE ARTHROPLASTY Left        Review of patient's allergies indicates:   Allergen Reactions    Gabapentin Swelling    Vistaril [hydroxyzine hcl] Rash         (Not in a hospital admission)  Family History     Problem Relation (Age of Onset)    Arthritis Sister, Brother, Sister, Brother    Carpal tunnel syndrome Sister    Diabetes Mother, Brother, Brother, Brother, Brother    DIEGO disease Mother    Heart murmur Daughter    Hypertension Mother    Kidney disease Mother, Brother    No Known Problems Son, Maternal Grandmother, Maternal Grandfather, Paternal Grandmother, Paternal Grandfather        Tobacco Use    Smoking status: Never Smoker    Smokeless tobacco: Never Used   Substance and Sexual Activity    Alcohol use: No     Alcohol/week: 0.0 oz    Drug use: No    Sexual activity: Yes     Partners: Male     ROS   Constitution: Negative for fever, chills, weight loss or gain.   HENT: Negative for sore throat, rhinorrhea,  or headache.  Eyes: Negative for blurred or double vision.   Cardiovascular: See above  Pulmonary: Positive for SOB   Gastrointestinal: Negative for abdominal pain, nausea, vomiting, or diarrhea.   : Negative for dysuria.   Neurological: Negative for focal weakness or sensory changes.    Objective:     Vital Signs (Most Recent):  Temp: 98 °F (36.7 °C) (10/08/18 1054)  Pulse: (!) 126 (10/08/18 1248)  Resp: (!) 27 (10/08/18 1248)  BP: (!) 92/53 (10/08/18 1248)  SpO2: 96 % (10/08/18 1248) Vital Signs (24h Range):  Temp:  [98 °F (36.7 °C)] 98 °F (36.7 °C)  Pulse:  [115-130] 126  Resp:  [25-30] 27  SpO2:  [88 %-100 %] 96 %  BP: ()/(50-73) 92/53     Weight: 104.3 kg (230 lb)  Body mass index is 40.74 kg/m².    SpO2: 96 %  O2 Device (Oxygen Therapy): venti mask    No intake or output data in the 24 hours ending 10/08/18 1344    Lines/Drains/Airways     Peripheral Intravenous Line                 Peripheral IV - Single Lumen 10/08/18 1054 Right Antecubital less than 1 day         Peripheral IV - Single Lumen 10/08/18 1214 Left Antecubital less than 1 day                Physical Exam  Constitutional: NAD, conversant  HEENT: Sclera anicteric, PERRLA, EOMI  Neck: No JVD, no carotid bruits  CV: Regular rhythm, Tachycardic, no murmur, S1/S2 with loud P2 component, No Pericardial rub  Pulm: CTAB with no wheezes, rales, or rhonchi  GI: Abdomen soft, NTND, +BS  Extremities: No LE edema, warm and well perfused, No cyanosis, No clubbing  Skin: No ecchymosis, erythema, or ulcers  Psych: AOx3, appropriate affect  Neuro: CNII-XII intact, no focal deficits      Significant Labs:   ABG:   Recent Labs   Lab  10/08/18   1130   PH  7.228*   PCO2  42.4   HCO3  17.7*   POCSATURATED  12*   BE  -10   , CMP   Recent Labs   Lab  10/08/18   1118   NA  145   K  3.5   CL  114*   CO2  15*   GLU  227*   BUN  11   CREATININE  1.0   CALCIUM  9.9   PROT  7.7   ALBUMIN  3.8   BILITOT  0.5   ALKPHOS  138*   AST  34   ALT  32   ANIONGAP  16    ESTGFRAFRICA  >60.0   EGFRNONAA  >60.0   , CBC   Recent Labs   Lab  10/08/18   1118  10/08/18   1131   WBC  6.79   --    HGB  11.6*   --    HCT  37.8  33*   PLT  273   --    , INR   Recent Labs   Lab  10/08/18   1118   INR  1.0   , Troponin   Recent Labs   Lab  10/08/18   1118   TROPONINI  0.007    and All pertinent lab results from the last 24 hours have been reviewed.    Significant Imaging:   CTA Chest (10/8/18):  1. There are extensive pulmonary arterial filling defects involving the distal bilateral main pulmonary arteries extending to lobar, segmental, and subsegmental branches involving all lobes.  Additionally, there is a linear component of saddle embolus.  2. Multiple pulmonary nodules bilaterally.  A previously identified pulmonary nodule within the left lower lobe is not confidently identified on the current exam.  Follow-up recommendations are given for the largest nodule, the remainder can be followed on the same schedule.  For multiple solid nodules with any 6 mm or greater, Fleischner Society guidelines recommend follow up with non-contrast chest CT at 3-6 months and 18-24 months after discovery.  3. Cholelithiasis, and several additional findings above.    TTE (10/8/18):  CONCLUSIONS     1 - Hyperdynamic left ventricular systolic function (EF >70%).     2 - Mildly to moderately depressed right ventricular systolic function .     3 - The estimated PA systolic pressure is 40 mmHg.     Assessment and Plan:     Acute saddle pulmonary embolism    63 y.o. female with PMH of bilateral chronic knee pain, DVT in 1990s on coumadin in the past, HTN, chronic back pain s/p lumbar fusion on 9/17/18 by neurosx who presents to the ED with c/o acute SOB. CTA chest revealed saddle PE. Submassive PE based on improvement in BP, mild biochemical marker elevation (BNP), and RV strain. After discussing with interventional cardiology the plan is to take for catheter directed thrombolysis. Spoke with neurosurgery on call  who said ok to proceed as benefit outweighs the risk.    Plan:  Catheter directed thrombolysis followed by CCU admission  - The risks, benefits & alternatives of the procedure were explained to the patient.    - The risks of catheter directed thrombolysis include but are not limited to:  Bleeding, infection, heart rhythm abnormalities, allergic reactions, and death.      - The risks of moderate sedation include hypotension, respiratory depression, arrhythmias, bronchospasm, & death.    - Informed consent was obtained & the patient is agreeable to proceed with the procedure.  - This patient was discussed with the attending interventional cardiologist who agrees with the above assessment & plan.                  VTE Risk Mitigation (From admission, onward)        Ordered     heparin 25,000 units in dextrose 5% 250 mL (100 units/mL) infusion HIGH INTENSITY nomogram - [facility]  Continuous      10/08/18 1211     heparin 25,000 units in dextrose 5% (100 units/ml) IV bolus from bag - ADDITIONAL PRN BOLUS - 60 units/kg  As needed (PRN)      10/08/18 1211     heparin 25,000 units in dextrose 5% (100 units/ml) IV bolus from bag - ADDITIONAL PRN BOLUS - 30 units/kg  As needed (PRN)      10/08/18 1211          Thank you for your consult. I will follow-up with patient. Please contact us if you have any additional questions.    Kishor Serrano MD  Interventional Cardiology   [facility]

## 2018-10-08 NOTE — ASSESSMENT & PLAN NOTE
Patient was found to have extensive bilateral pulmonary thromboembolism with linear saddle component on chest CT. Pulmonary embolism was diagnosed as a submassive PE due to right heart strain, as well as elevated BNP (>90). She was started on a heparin drip (18 units/kg/hr, at 13.2 ml/hr).    - Will be taken for catheter directed thrombolysis. Neurosurgery informed due to patients recent surgery. Neurosurgery gave approval for the procedure.  - Interventional cardiology following. Will appreciate recommendations for duration of anticoagulation treatment.

## 2018-10-08 NOTE — PLAN OF CARE
Problem: Patient Care Overview  Goal: Plan of Care Review  Outcome: Ongoing (interventions implemented as appropriate)    No acute events throughout day. See vital signs and assessments in flowsheets. See below for updates on today's progress.     Pulmonary: Pt. remains on venti mask. SPO2 %.     Cardiovascular: Sinus tachycardia. HR 100s-110s. SBP 130s/140s.     Neurological: AAOx4. Afebrile.     Gastrointestinal: No BM throughout shift. Last BM this am.     Genitourinary: Purwick in place. Pt. has not voided yet.     Integumentary/Other: No skin breakdown noted.     Infusions: TPA 1mg/hr.     Patient progressing towards goals as tolerated, plan of care communicated and reviewed with Rosa Fermin and family. All concerns addressed. Will continue to monitor.

## 2018-10-08 NOTE — PROCEDURES
"            Interventional Cardiology   Post Cath Note      Referring Physician: Jigar Orta MD  Procedure:Catheter directed TPA   Indication: PE    SUBJECTIVE:   Patient tolerated procedure well with no complications. Please see full cath report for more details.   Cath Results:   Access:  R CFV      See full cath report for further details    Intervention:     Patient tolerated the procedure well, no complications    Post Cath Exam:   BP (!) 126/58   Pulse (!) 121   Temp 98 °F (36.7 °C) (Axillary)   Resp (!) 24   Ht 5' 3" (1.6 m)   Wt 104.3 kg (230 lb)   SpO2 100%   BMI 40.74 kg/m²     No unusual pain, hematoma, thrill or bruit at vascular access site.  Distal pulse present without signs of ischemia.    Assessment / Plan:     - TPA for 24 h.  - Heparin infusion.  - Do not trend PA pressure overnight we will re evaluate in am.  - 2D echo.  - CBC and Fibrinogen every 6 hours.  - Routine post cath protocol.  - IVFs for ROYAL prevention.  - Discussed with CCU fellow.   - Further care by the primary team.  - Will follow up on patient.    Attending addendum to follow     Miryam Mcrae MD  Cardiology Fellow  Pager: [phone]        "

## 2018-10-08 NOTE — SUBJECTIVE & OBJECTIVE
Past Medical History:   Diagnosis Date    Acute saddle pulmonary embolism with acute cor pulmonale 10/8/2018    Atypical chest pain     Bilateral chronic knee pain     Carpal tunnel syndrome on both sides     Chronic lower back pain     Deep vein thrombosis     History of DVT (deep vein thrombosis)     left leg in the 90s    Hypertension     Osteoarthritis of both knees        Past Surgical History:   Procedure Laterality Date    COLONOSCOPY  2007    Dr Bolton    COLONOSCOPY N/A 12/11/2017    Procedure: COLONOSCOPY;  Surgeon: Shanelle Braga MD;  Location: [facility];  Service: Endoscopy;  Laterality: N/A;    COLONOSCOPY N/A 12/11/2017    Performed by Shanelle Braga MD at [facility] ENDO    ENDOMETRIAL ABLATION N/A 2003    FUSION, SPINE, LUMBAR, TLIF, MINIMALLY INVASIVE @ L4-L5 & L5-S1 N/A 9/17/2018    Performed by Noel Bernal MD at [facility] OR [address] [place] [address]    MINIMALLY INVASIVE TRANSFORAMINAL LUMBAR INTERBODY FUSION (TLIF) N/A 9/17/2018    Procedure: FUSION, SPINE, LUMBAR, TLIF, MINIMALLY INVASIVE @ L4-L5 & L5-S1;  Surgeon: Noel Bernal MD;  Location: [facility] OR [address] [place] [address];  Service: Neurosurgery;  Laterality: N/A;    REPLACEMENT-KNEE-TOTAL   Left 11/19/2015    Performed by Mitch Mujica MD at [facility] OR    SHOULDER ARTHROSCOPY W/ ROTATOR CUFF REPAIR Bilateral 2012, 2013    TOTAL KNEE ARTHROPLASTY Left        Review of patient's allergies indicates:   Allergen Reactions    Gabapentin Swelling    Vistaril [hydroxyzine hcl] Rash       No current facility-administered medications on file prior to encounter.      Current Outpatient Medications on File Prior to Encounter   Medication Sig    furosemide (LASIX) 20 MG tablet Take 20 mg by mouth once daily.    losartan (COZAAR) 100 MG tablet Take 100 mg by mouth once daily.    metoprolol succinate (TOPROL-XL) 25 MG 24 hr tablet Take 1 tablet (25 mg total) by mouth 2 (two) times daily.    oxyCODONE-acetaminophen (PERCOCET) 5-325 mg per tablet Take  1 tablet by mouth every 6 (six) hours as needed for Pain.    potassium chloride SA (K-DUR,KLOR-CON) 10 MEQ tablet Take 20 mEq by mouth every evening.    ranitidine (ZANTAC) 150 MG tablet Take 150 mg by mouth nightly.    tiZANidine (ZANAFLEX) 4 MG tablet Take 4 mg by mouth every evening.    topiramate (TOPAMAX) 200 MG Tab Take 1 tablet (200 mg total) by mouth 2 (two) times daily.    oxyCODONE-acetaminophen (PERCOCET) 7.5-325 mg per tablet Take 1 tablet by mouth every 6 (six) hours as needed (pain).     Family History     Problem Relation (Age of Onset)    Arthritis Sister, Brother, Sister, Brother    Carpal tunnel syndrome Sister    Diabetes Mother, Brother, Brother, Brother, Brother    DIEGO disease Mother    Heart murmur Daughter    Hypertension Mother    Kidney disease Mother, Brother    No Known Problems Son, Maternal Grandmother, Maternal Grandfather, Paternal Grandmother, Paternal Grandfather        Tobacco Use    Smoking status: Never Smoker    Smokeless tobacco: Never Used   Substance and Sexual Activity    Alcohol use: No     Alcohol/week: 0.0 oz    Drug use: No    Sexual activity: Yes     Partners: Male     Review of Systems   Constitution: Negative for chills, fever, weakness and malaise/fatigue.   Cardiovascular: Positive for leg swelling (right). Negative for chest pain, dyspnea on exertion and palpitations.   Respiratory: Positive for shortness of breath. Negative for cough and wheezing.    Musculoskeletal: Positive for myalgias (right leg). Negative for arthritis, back pain, falls and joint pain.   Gastrointestinal: Negative for abdominal pain, constipation, diarrhea, nausea and vomiting.   Neurological: Negative for dizziness, headaches and numbness.     Objective:     Vital Signs (Most Recent):  Temp: 98 °F (36.7 °C) (10/08/18 1054)  Pulse: (!) 121 (10/08/18 1515)  Resp: (!) 24 (10/08/18 1515)  BP: (!) 126/58 (10/08/18 1515)  SpO2: 100 % (10/08/18 1515) Vital Signs (24h Range):  Temp:  [98 °F  (36.7 °C)] 98 °F (36.7 °C)  Pulse:  [115-130] 121  Resp:  [24-30] 24  SpO2:  [88 %-100 %] 100 %  BP: ()/(50-73) 126/58     Weight: 104.3 kg (230 lb)  Body mass index is 40.74 kg/m².    SpO2: 100 %  O2 Device (Oxygen Therapy): venti mask    No intake or output data in the 24 hours ending 10/08/18 1607    Lines/Drains/Airways     Peripheral Intravenous Line                 Peripheral IV - Single Lumen 10/08/18 1054 Right Antecubital less than 1 day         Peripheral IV - Single Lumen 10/08/18 1214 Left Antecubital less than 1 day                Physical Exam   Constitutional: She is oriented to person, place, and time. She appears well-developed and well-nourished. No distress.   Neck: Normal range of motion. Neck supple.   Cardiovascular: Regular rhythm, normal heart sounds and intact distal pulses.   tachycardic   Pulmonary/Chest: Effort normal and breath sounds normal. No respiratory distress. She has no wheezes. She has no rales.   On 15L 50% O2   Abdominal: Soft. Bowel sounds are normal. There is no tenderness. There is no guarding.   Musculoskeletal: Normal range of motion. She exhibits edema (right leg, nonpitting), tenderness (right leg) and deformity (dark discoloration of left leg, possible due to previous left leg DVT. No loss of function).   Neurological: She is alert and oriented to person, place, and time. She has normal reflexes.   Skin: Skin is warm and dry.   Psychiatric: She has a normal mood and affect. Her behavior is normal.   Vitals reviewed.      Significant Labs:   ABG:   Recent Labs   Lab  10/08/18   1130   PH  7.228*   PCO2  42.4   HCO3  17.7*   POCSATURATED  12*   BE  -10   , CMP   Recent Labs   Lab  10/08/18   1118   NA  145   K  3.5   CL  114*   CO2  15*   GLU  227*   BUN  11   CREATININE  1.0   CALCIUM  9.9   PROT  7.7   ALBUMIN  3.8   BILITOT  0.5   ALKPHOS  138*   AST  34   ALT  32   ANIONGAP  16   ESTGFRAFRICA  >60.0   EGFRNONAA  >60.0   , CBC   Recent Labs   Lab  10/08/18    1118  10/08/18   1131   WBC  6.79   --    HGB  11.6*   --    HCT  37.8  33*   PLT  273   --    , INR   Recent Labs   Lab  10/08/18   1118   INR  1.0    and Troponin   Recent Labs   Lab  10/08/18   1118  10/08/18   1434   TROPONINI  0.007  1.170*     BNP: 115      Significant Imaging:   CTA Chest PE study:  1. Extensive bilateral pulmonary thromboembolism noting linear saddle component.  Correlation advised.  2. Multiple pulmonary nodules bilaterally.  A previously identified pulmonary nodule within the left lower lobe is not confidently identified on the current exam.  Follow-up recommendations are given for the largest nodule, the remainder can be followed on the same schedule.  For multiple solid nodules with any 6 mm or greater, Fleischner Society guidelines recommend follow up with non-contrast chest CT at 3-6 months and 18-24 months after discovery.  3. Cholelithiasis, and several additional findings above.    X-ray chest: No acute abnormality.

## 2018-10-08 NOTE — H&P
[facility]  Cardiology  History and Physical     Patient Name: Rosa Fermin  MRN: [id number]  Admission Date: 10/8/2018  Code Status: Full Code   Attending Provider: Jigar Orta MD   Primary Care Physician: Chery Lopez MD  Principal Problem:Acute saddle pulmonary embolism without acute cor pulmonale    Patient information was obtained from patient, relative(s) and ER records.     Subjective:     Chief Complaint:  Shortness of breath     HPI:  Patient is a 63 year old female with HTN, chronic low back and knee pain, and history of DVT (in 1997) that presents to hospital with complaint of shortness of breath. She reports that her problems started she was walking through the [facility] parking lot to come see Dr. Bernal. She had a laminectomy, diskectomy, and spinal fusion on 9/17/2018 and she was coming in on 10/8 for a follow up appointment. While walking, she suddenly felt very short of breath. She denies any chest pain, syncope, or palpitations during this time. She asked her daughter who was with her to get her a wheel chair so she could be brought into hospital. Even while seated she remained short of breath. She arrived to the emergency department where she underwent a CT scan that showed extensive bilateral pulmonary thromboembolism with linear saddle component. Her troponin was negative and BNP was 115. TTE at bedside showed evidence of RV strain, Orozco's sign, mid and apical RV hypokinesis with mildly depressed RV function, PASP 40, hyperdynamic LVEF (>73%). She was started on a heparin drip, and cardiology was called.    Of note, patient has a history of DVT in her left leg diagnosed in 1997. The clot happened after she had been immobile for a period of time after an ankle sprain. She was placed on coumadin for three years, and was eventually taken off of it; she is currently on no anticoagulation. Patient reports feeling less mobile since her surgery on 9/17, but  reports that she still ambulates around her home. She denies any history of travel, smoking history, diabetes, or hyperlipidemia. At time of the exam, patient only complained of shortness of breath which was improved on her oxygen as well as some right leg pain and swelling. She is not aware of any history of clotting problems in her family or any autoimmune diseases.     Spoke to patient about her code status. She informed us that she would like to be full code.     Past Medical History:   Diagnosis Date    Acute saddle pulmonary embolism with acute cor pulmonale 10/8/2018    Atypical chest pain     Bilateral chronic knee pain     Carpal tunnel syndrome on both sides     Chronic lower back pain     Deep vein thrombosis     History of DVT (deep vein thrombosis)     left leg in the 90s    Hypertension     Osteoarthritis of both knees        Past Surgical History:   Procedure Laterality Date    COLONOSCOPY  2007    Dr Bolton    COLONOSCOPY N/A 12/11/2017    Procedure: COLONOSCOPY;  Surgeon: Shanelle Braga MD;  Location: [facility];  Service: Endoscopy;  Laterality: N/A;    COLONOSCOPY N/A 12/11/2017    Performed by Shanelle [facility]aga MD at [facility] ENDO    ENDOMETRIAL ABLATION N/A 2003    FUSION, SPINE, LUMBAR, TLIF, MINIMALLY INVASIVE @ L4-L5 & L5-S1 N/A 9/17/2018    Performed by Noel Bernal MD at [facility] OR [address] [place] [address]    MINIMALLY INVASIVE TRANSFORAMINAL LUMBAR INTERBODY FUSION (TLIF) N/A 9/17/2018    Procedure: FUSION, SPINE, LUMBAR, TLIF, MINIMALLY INVASIVE @ L4-L5 & L5-S1;  Surgeon: Noel Bernal MD;  Location: [facility] OR [address] [place] [address];  Service: Neurosurgery;  Laterality: N/A;    REPLACEMENT-KNEE-TOTAL   Left 11/19/2015    Performed by Mitch Mujica MD at [facility] OR    SHOULDER ARTHROSCOPY W/ ROTATOR CUFF REPAIR Bilateral 2012, 2013    TOTAL KNEE ARTHROPLASTY Left        Review of patient's allergies indicates:   Allergen Reactions    Gabapentin Swelling    Vistaril [hydroxyzine hcl] Rash        No current facility-administered medications on file prior to encounter.      Current Outpatient Medications on File Prior to Encounter   Medication Sig    furosemide (LASIX) 20 MG tablet Take 20 mg by mouth once daily.    losartan (COZAAR) 100 MG tablet Take 100 mg by mouth once daily.    metoprolol succinate (TOPROL-XL) 25 MG 24 hr tablet Take 1 tablet (25 mg total) by mouth 2 (two) times daily.    oxyCODONE-acetaminophen (PERCOCET) 5-325 mg per tablet Take 1 tablet by mouth every 6 (six) hours as needed for Pain.    potassium chloride SA (K-DUR,KLOR-CON) 10 MEQ tablet Take 20 mEq by mouth every evening.    ranitidine (ZANTAC) 150 MG tablet Take 150 mg by mouth nightly.    tiZANidine (ZANAFLEX) 4 MG tablet Take 4 mg by mouth every evening.    topiramate (TOPAMAX) 200 MG Tab Take 1 tablet (200 mg total) by mouth 2 (two) times daily.    oxyCODONE-acetaminophen (PERCOCET) 7.5-325 mg per tablet Take 1 tablet by mouth every 6 (six) hours as needed (pain).     Family History     Problem Relation (Age of Onset)    Arthritis Sister, Brother, Sister, Brother    Carpal tunnel syndrome Sister    Diabetes Mother, Brother, Brother, Brother, Brother    DIEGO disease Mother    Heart murmur Daughter    Hypertension Mother    Kidney disease Mother, Brother    No Known Problems Son, Maternal Grandmother, Maternal Grandfather, Paternal Grandmother, Paternal Grandfather        Tobacco Use    Smoking status: Never Smoker    Smokeless tobacco: Never Used   Substance and Sexual Activity    Alcohol use: No     Alcohol/week: 0.0 oz    Drug use: No    Sexual activity: Yes     Partners: Male     Review of Systems   Constitution: Negative for chills, fever, weakness and malaise/fatigue.   Cardiovascular: Positive for leg swelling (right). Negative for chest pain, dyspnea on exertion and palpitations.   Respiratory: Positive for shortness of breath. Negative for cough and wheezing.    Musculoskeletal: Positive for myalgias  (right leg). Negative for arthritis, back pain, falls and joint pain.   Gastrointestinal: Negative for abdominal pain, constipation, diarrhea, nausea and vomiting.   Neurological: Negative for dizziness, headaches and numbness.     Objective:     Vital Signs (Most Recent):  Temp: 98 °F (36.7 °C) (10/08/18 1054)  Pulse: (!) 121 (10/08/18 1515)  Resp: (!) 24 (10/08/18 1515)  BP: (!) 126/58 (10/08/18 1515)  SpO2: 100 % (10/08/18 1515) Vital Signs (24h Range):  Temp:  [98 °F (36.7 °C)] 98 °F (36.7 °C)  Pulse:  [115-130] 121  Resp:  [24-30] 24  SpO2:  [88 %-100 %] 100 %  BP: ()/(50-73) 126/58     Weight: 104.3 kg (230 lb)  Body mass index is 40.74 kg/m².    SpO2: 100 %  O2 Device (Oxygen Therapy): venti mask    No intake or output data in the 24 hours ending 10/08/18 1607    Lines/Drains/Airways     Peripheral Intravenous Line                 Peripheral IV - Single Lumen 10/08/18 1054 Right Antecubital less than 1 day         Peripheral IV - Single Lumen 10/08/18 1214 Left Antecubital less than 1 day                Physical Exam   Constitutional: She is oriented to person, place, and time. She appears well-developed and well-nourished. No distress.   Neck: Normal range of motion. Neck supple.   Cardiovascular: Regular rhythm, normal heart sounds and intact distal pulses.   tachycardic   Pulmonary/Chest: Effort normal and breath sounds normal. No respiratory distress. She has no wheezes. She has no rales.   On 15L 50% O2   Abdominal: Soft. Bowel sounds are normal. There is no tenderness. There is no guarding.   Musculoskeletal: Normal range of motion. She exhibits edema (right leg, nonpitting), tenderness (right leg) and deformity (dark discoloration of left leg, possible due to previous left leg DVT. No loss of function).   Neurological: She is alert and oriented to person, place, and time. She has normal reflexes.   Skin: Skin is warm and dry.   Psychiatric: She has a normal mood and affect. Her behavior is  normal.   Vitals reviewed.      Significant Labs:   ABG:   Recent Labs   Lab  10/08/18   1130   PH  7.228*   PCO2  42.4   HCO3  17.7*   POCSATURATED  12*   BE  -10   , CMP   Recent Labs   Lab  10/08/18   1118   NA  145   K  3.5   CL  114*   CO2  15*   GLU  227*   BUN  11   CREATININE  1.0   CALCIUM  9.9   PROT  7.7   ALBUMIN  3.8   BILITOT  0.5   ALKPHOS  138*   AST  34   ALT  32   ANIONGAP  16   ESTGFRAFRICA  >60.0   EGFRNONAA  >60.0   , CBC   Recent Labs   Lab  10/08/18   1118  10/08/18   1131   WBC  6.79   --    HGB  11.6*   --    HCT  37.8  33*   PLT  273   --    , INR   Recent Labs   Lab  10/08/18   1118   INR  1.0    and Troponin   Recent Labs   Lab  10/08/18   1118  10/08/18   1434   TROPONINI  0.007  1.170*     BNP: 115      Significant Imaging:   CTA Chest PE study:  1. Extensive bilateral pulmonary thromboembolism noting linear saddle component.  Correlation advised.  2. Multiple pulmonary nodules bilaterally.  A previously identified pulmonary nodule within the left lower lobe is not confidently identified on the current exam.  Follow-up recommendations are given for the largest nodule, the remainder can be followed on the same schedule.  For multiple solid nodules with any 6 mm or greater, Fleischner Society guidelines recommend follow up with non-contrast chest CT at 3-6 months and 18-24 months after discovery.  3. Cholelithiasis, and several additional findings above.    X-ray chest: No acute abnormality.      Assessment and Plan:     * Acute saddle pulmonary embolism without acute cor pulmonale    Patient was found to have extensive bilateral pulmonary thromboembolism with linear saddle component on chest CT. Pulmonary embolism was diagnosed as a submassive PE due to right heart strain, as well as elevated BNP (>90). She was started on a heparin drip (18 units/kg/hr, at 13.2 ml/hr).    - Will be taken for catheter directed thrombolysis. Neurosurgery informed due to patients recent surgery.  Neurosurgery gave approval for the procedure.  - Started on alteplase 1 mg/hr   - CBC and fibrinogen q6h       *If fibrinogen is >150 and <180 reduce rate by half       *If fibrinogen is <150 hold tPA drip until next check    - Interventional cardiology following.            Lumbar spondylosis             History of cardiac cath             Edema    - Patient has minor swelling on right leg when compared to left as well as some right leg pain.   - Ordered bilateral lower extremity venous ultrasound to investigate for DVT        History of DVT (deep vein thrombosis)- in 1997 - Provoked DVT    - DVT in left leg  - Was on coumadin for 3 years, no current anticoagulation  - Likely will require life long anticoagulation as this is second incidence of clot        HTN (hypertension)    - Patient normally on furosemide 20 mg, losartan 100 mg, and metoprolol succinate 25 mg as outpatient  - Holding antihypertensive medications in setting of acute PE            VTE Risk Mitigation (From admission, onward)        Ordered     IP VTE HIGH RISK PATIENT  Once      10/08/18 1457     heparin 25,000 units in dextrose 5% 250 mL (100 units/mL) infusion HIGH INTENSITY nomogram - [facility]  Continuous      10/08/18 1211     heparin 25,000 units in dextrose 5% (100 units/ml) IV bolus from bag - ADDITIONAL PRN BOLUS - 60 units/kg  As needed (PRN)      10/08/18 1211     heparin 25,000 units in dextrose 5% (100 units/ml) IV bolus from bag - ADDITIONAL PRN BOLUS - 30 units/kg  As needed (PRN)      10/08/18 1211          Lowell Reeder MD  Cardiology   [facility]

## 2018-10-08 NOTE — ASSESSMENT & PLAN NOTE
- DVT in left leg  - Was on coumadin for 3 years, no current anticoagulation  - Likely will require life long anticoagulation as this is second incidence of clot

## 2018-10-08 NOTE — HPI
Patient is a 63 year old female with HTN, chronic low back and knee pain, and history of DVT (in 1997) that presents to hospital with complaint of shortness of breath. She reports that her problems started she was walking through the [facility] parking lot to come see Dr. Bernal. She had a laminectomy, diskectomy, and spinal fusion on 9/17/2018 and she was coming in on 10/8 for a follow up appointment. While walking, she suddenly felt very short of breath. She denies any chest pain, syncope, or palpitations during this time. She asked her daughter who was with her to get her a wheel chair so she could be brought into hospital. Even while seated she remained short of breath. She arrived to the emergency department where she underwent a CT scan that showed extensive bilateral pulmonary thromboembolism with linear saddle component. Her troponin was negative and BNP was 115. TTE at bedside showed evidence of RV strain, Orozco's sign, mid and apical RV hypokinesis with mildly depressed RV function, PASP 40, hyperdynamic LVEF (>73%). She was started on a heparin drip, and cardiology was called.    Of note, patient has a history of DVT in her left leg diagnosed in 1997. The clot happened after she had been immobile for a period of time after an ankle sprain. She was placed on coumadin for three years, and was eventually taken off of it; she is currently on no anticoagulation. Patient reports feeling less mobile since her surgery on 9/17, but reports that she still ambulates around her home. She denies any history of travel, smoking history, diabetes, or hyperlipidemia. At time of the exam, patient only complained of shortness of breath which was improved on her oxygen as well as some right leg pain and swelling. She is not aware of any history of clotting problems in her family or any autoimmune diseases.     Spoke to patient about her code status. She informed us that she would like to be full code.

## 2018-10-08 NOTE — ED PROVIDER NOTES
ic to Encounter Date: 10/8/2018    SCRIBE #1 NOTE: I, Son Namrata, am scribing for, and in the presence of,  Dr. Bryson. I have scribed the following portions of the note -       History     Chief Complaint   Patient presents with    Hypoxia     O2 sat 80 on room air, then only 88% on 6 L NC     Time patient was seen by the provider: 10:58 AM      The patient is a 63 y.o. female with co-morbidities including: atypical chest pain, bilateral chronic knee pain, DVT, HTN who presents to the ED with a complaint of hypoxia. Pt was walking from the parking garage to the hospital for a revaluation of her lumbar fusion when she felt suddenly onset of dyspnea. She notes that her symptoms worsens with exertion. Symptoms has been constant and nothing alleviates it. Pt was feeling fine this morning. Denies any similar symptoms in past. She has a pmhx of a blood clot in her right leg. Currently, she is not on any blood thinners. Denies any pain, leg pain, chest pain, cough, fever.      The history is provided by the patient and medical records.     Review of patient's allergies indicates:   Allergen Reactions    Gabapentin Swelling    Vistaril [hydroxyzine hcl] Rash     Past Medical History:   Diagnosis Date    Atypical chest pain     Bilateral chronic knee pain     Carpal tunnel syndrome on both sides     Chronic lower back pain     Deep vein thrombosis     History of DVT (deep vein thrombosis)     left leg in the 90s    Hypertension     Osteoarthritis of both knees      Past Surgical History:   Procedure Laterality Date    COLONOSCOPY  2007    Dr Bolton    COLONOSCOPY N/A 12/11/2017    Procedure: COLONOSCOPY;  Surgeon: Shanelle Braga MD;  Location: [facility];  Service: Endoscopy;  Laterality: N/A;    COLONOSCOPY N/A 12/11/2017    Performed by Shanelle Braga MD at [facility]    ENDOMETRIAL ABLATION N/A 2003    FUSION, SPINE, LUMBAR, TLIF, MINIMALLY INVASIVE @ L4-L5 & L5-S1 N/A 9/17/2018     Performed by Noel Bernal MD at [facility] OR 2ND FLR    MINIMALLY INVASIVE TRANSFORAMINAL LUMBAR INTERBODY FUSION (TLIF) N/A 9/17/2018    Procedure: FUSION, SPINE, LUMBAR, TLIF, MINIMALLY INVASIVE @ L4-L5 & L5-S1;  Surgeon: Noel Bernal MD;  [facility];  Service: Neurosurgery;  Laterality: N/A;    REPLACEMENT-KNEE-TOTAL   Left 11/19/2015    [facility]    SHOULDER ARTHROSCOPY W/ ROTATOR CUFF REPAIR Bilateral 2012, 2013    TOTAL KNEE ARTHROPLASTY Left      Family History   Problem Relation Age of Onset    Diabetes Mother         IDDM    DIEGO disease Mother     Hypertension Mother     Kidney disease Mother     Carpal tunnel syndrome Sister     Arthritis Sister     Diabetes Brother     Kidney disease Brother         kidney transplant    Arthritis Brother     Heart murmur Daughter     No Known Problems Son     Arthritis Sister     Diabetes Brother     Arthritis Brother     Diabetes Brother     Diabetes Brother     No Known Problems Maternal Grandmother     No Known Problems Maternal Grandfather     No Known Problems Paternal Grandmother     No Known Problems Paternal Grandfather      Social History     Tobacco Use    Smoking status: Never Smoker    Smokeless tobacco: Never Used   Substance Use Topics    Alcohol use: No     Alcohol/week: 0.0 oz    Drug use: No     Review of Systems   Constitutional: Negative for fever.   HENT: Negative for congestion.    Eyes: Negative for visual disturbance.   Respiratory: Positive for shortness of breath. Negative for cough.    Cardiovascular: Negative for chest pain and leg swelling.   Gastrointestinal: Negative for abdominal pain.   Genitourinary: Negative for dysuria.   Musculoskeletal: Negative for back pain.   Skin: Negative for color change.   Neurological: Positive for weakness.       Physical Exam     Initial Vitals   BP Pulse Resp Temp SpO2   10/08/18 1055 10/08/18 1046 10/08/18 1046 10/08/18 1054  10/08/18 1046   91/73 (!) 115 (!) 30 98 °F (36.7 °C) (!) 88 %      MAP       --                Physical Exam    Nursing note and vitals reviewed.  Constitutional: She appears well-developed. She appears ill.   HENT:   Head: Normocephalic and atraumatic.   Eyes: EOM are normal. Pupils are equal, round, and reactive to light.   Cardiovascular: Tachycardia present.    No murmur heard.  Pulmonary/Chest: Tachypnea noted.   Clear bilaterally    Abdominal: Soft. There is no tenderness.   Musculoskeletal: She exhibits no edema (bilateral LE) or tenderness (bilateral LE).   Neurological: She is alert and oriented to person, place, and time.   Skin:   Midline incision on lumbar spine no surrounding erythema or discharge          ED Course   Procedures  Labs Reviewed   CBC W/ AUTO DIFFERENTIAL - Abnormal; Notable for the following components:       Result Value    Hemoglobin 11.6 (*)     MCHC 30.7 (*)     RDW 16.4 (*)     MPV 9.0 (*)     Lymph% 50.4 (*)     All other components within normal limits   B-TYPE NATRIURETIC PEPTIDE - Abnormal; Notable for the following components:     (*)     All other components within normal limits   ISTAT PROCEDURE - Abnormal; Notable for the following components:    POC Glucose 262 (*)     POC Chloride 112 (*)     POC TCO2 (MEASURED) 17 (*)     POC Hematocrit 33 (*)     All other components within normal limits   ISTAT PROCEDURE - Abnormal; Notable for the following components:    POC PH 7.228 (*)     POC PO2 13 (*)     POC HCO3 17.7 (*)     POC SATURATED O2 12 (*)     POC TCO2 19 (*)     All other components within normal limits   TROPONIN I   COMPREHENSIVE METABOLIC PANEL   TROPONIN I   APTT   PROTIME-INR   TYPE & SCREEN   ISTAT CHEM8     EKG Readings: (Independently Interpreted)   Rhythm: Sinus Tachycardia. Heart Rate: 118 bpm. ST Segment Elevation: AVR, V6 and I. T Waves: Normal. Axis: Normal.       Imaging Results          CTA Chest Non-Coronary - PE Study (In process)                 X-Ray Chest AP Portable (Final result)  Result time 10/08/18 11:31:42    Final result by Byron Rangel MD (10/08/18 11:31:42)                 Impression:      No acute abnormality.      Electronically signed by: Byron Rangel MD  Date:    10/08/2018  Time:    11:31             Narrative:    EXAMINATION:  XR CHEST AP PORTABLE    CLINICAL HISTORY:  SOB;    TECHNIQUE:  Single frontal portable view of the chest was performed.    COMPARISON:  Chest radiograph 09/18/2018    FINDINGS:  Support devices: None    The lungs are clear, with normal appearance of pulmonary vasculature and no pleural effusion or pneumothorax.    The cardiac silhouette is normal in size. The hilar and mediastinal contours are unremarkable.    Bones are intact.                              X-Rays:   Independently Interpreted Readings:   Other Readings:  Bedside ultrasound: no pericardial effusion, no obvious right heart strain     Medical Decision Making:   History:   Old Medical Records: I decided to obtain old medical records.  Initial Assessment:   64 y/o female with history of HTN and recent lumbar fusion with acute dyspnea, tachycardiac, and hypoxia in the ED. No chest pain.  Differential Diagnosis:    Concern for PE vs ACS vs less likely infection. Full set of labs, BNP, chest x-ray, CTA to check for PE.  Independently Interpreted Test(s):   I have ordered and independently interpreted EKG Reading(s) - see prior notes  Clinical Tests:   Lab Tests: Reviewed and Ordered  Radiological Study: Reviewed and Ordered  Medical Tests: Ordered and Reviewed            Scribe Attestation:   Scribe #1: I performed the above scribed service and the documentation accurately describes the services I performed. I attest to the accuracy of the note.    Attending Attestation:         Attending Critical Care:   Critical Care Times:   Direct Patient Care (initial evaluation, reassessments, and time considering the  case)................................................................15 minutes.   Additional History from reviewing old medical records or taking additional history from the family, EMS, PCP, etc.......................5 minutes.   Ordering, Reviewing, and Interpreting Diagnostic Studies...............................................................................................................5 minutes.   Documentation..................................................................................................................................................................................5 minutes.   Consultation with other Physicians. .................................................................................................................................................10 minutes.   Consultation with the patient's family directly relating to the patient's condition, care, and DNR status (when patient unable)......5 minutes.   ==============================================================  · Total Critical Care Time - exclusive of procedural time: 45 minutes.  ==============================================================  Critical care reasons: Large b/l pulmonary embolism.                   Clinical Impression:   There were no encounter diagnoses.      Disposition:   Disposition: Admitted  Condition: Stable                        Leonardo Bryson MD  10/08/18 2057

## 2018-10-08 NOTE — ED NOTES
Report given to Robe in Cath Lab - states will call when ready for pt and when time to stop heparin. Also states to administer benadryl for procedure at this time.

## 2018-10-08 NOTE — PROVIDER PROGRESS NOTES - EMERGENCY DEPT.
Encounter Date: 10/8/2018    ED Physician Progress Notes        Physician Note:   CT scan reviewed. Large b/l PE.   Placed on heparin drip.  Card contacted, discussed case. Given BP stable 90s/50s, and low biomarkers (, Trop neg), no intervention from them at this time.  Contacted [facility], they will comes down and see patient for admission.  Pt sat % on venti mask. Tachypnic but not tiring out at this time.   Will continue to monitor BP closely.

## 2018-10-09 PROBLEM — I26.02 ACUTE SADDLE PULMONARY EMBOLISM WITH ACUTE COR PULMONALE: Status: ACTIVE | Noted: 2018-10-08

## 2018-10-09 LAB
ALBUMIN SERPL BCP-MCNC: 3.3 G/DL
ALP SERPL-CCNC: 131 U/L
ALT SERPL W/O P-5'-P-CCNC: 79 U/L
ANION GAP SERPL CALC-SCNC: 10 MMOL/L
APTT BLDCRRT: 22.9 SEC
APTT BLDCRRT: 22.9 SEC
APTT BLDCRRT: 37.8 SEC
AST SERPL-CCNC: 47 U/L
BASOPHILS # BLD AUTO: 0.01 K/UL
BASOPHILS # BLD AUTO: 0.01 K/UL
BASOPHILS # BLD AUTO: 0.02 K/UL
BASOPHILS NFR BLD: 0.2 %
BASOPHILS NFR BLD: 0.2 %
BASOPHILS NFR BLD: 0.3 %
BASOPHILS NFR BLD: 0.4 %
BASOPHILS NFR BLD: 0.4 %
BASOPHILS NFR BLD: 0.5 %
BASOPHILS NFR BLD: 0.5 %
BILIRUB SERPL-MCNC: 0.4 MG/DL
BUN SERPL-MCNC: 14 MG/DL
CALCIUM SERPL-MCNC: 8.8 MG/DL
CHLORIDE SERPL-SCNC: 119 MMOL/L
CO2 SERPL-SCNC: 17 MMOL/L
CREAT SERPL-MCNC: 0.7 MG/DL
DIFFERENTIAL METHOD: ABNORMAL
EOSINOPHIL # BLD AUTO: 0.1 K/UL
EOSINOPHIL # BLD AUTO: 0.2 K/UL
EOSINOPHIL NFR BLD: 1.7 %
EOSINOPHIL NFR BLD: 2.6 %
EOSINOPHIL NFR BLD: 2.6 %
EOSINOPHIL NFR BLD: 3.4 %
EOSINOPHIL NFR BLD: 3.5 %
EOSINOPHIL NFR BLD: 3.5 %
EOSINOPHIL NFR BLD: 3.7 %
ERYTHROCYTE [DISTWIDTH] IN BLOOD BY AUTOMATED COUNT: 16.7 %
ERYTHROCYTE [DISTWIDTH] IN BLOOD BY AUTOMATED COUNT: 17.1 %
ERYTHROCYTE [DISTWIDTH] IN BLOOD BY AUTOMATED COUNT: 17.2 %
EST. GFR  (AFRICAN AMERICAN): >60 ML/MIN/1.73 M^2
EST. GFR  (NON AFRICAN AMERICAN): >60 ML/MIN/1.73 M^2
ESTIMATED AVG GLUCOSE: 94 MG/DL
FIBRINOGEN PPP-MCNC: 258 MG/DL
FIBRINOGEN PPP-MCNC: 264 MG/DL
FIBRINOGEN PPP-MCNC: 264 MG/DL
FIBRINOGEN PPP-MCNC: 276 MG/DL
FIBRINOGEN PPP-MCNC: 280 MG/DL
FIBRINOGEN PPP-MCNC: 280 MG/DL
FIBRINOGEN PPP-MCNC: 313 MG/DL
GLUCOSE SERPL-MCNC: 131 MG/DL
HBA1C MFR BLD HPLC: 4.9 %
HCT VFR BLD AUTO: 30.6 %
HCT VFR BLD AUTO: 31.4 %
HCT VFR BLD AUTO: 31.4 %
HCT VFR BLD AUTO: 31.5 %
HCT VFR BLD AUTO: 31.6 %
HCT VFR BLD AUTO: 31.6 %
HCT VFR BLD AUTO: 33.1 %
HGB BLD-MCNC: 10 G/DL
HGB BLD-MCNC: 10.2 G/DL
HGB BLD-MCNC: 10.2 G/DL
HGB BLD-MCNC: 10.5 G/DL
HGB BLD-MCNC: 9.8 G/DL
IMM GRANULOCYTES # BLD AUTO: 0 K/UL
IMM GRANULOCYTES # BLD AUTO: 0.01 K/UL
IMM GRANULOCYTES NFR BLD AUTO: 0 %
IMM GRANULOCYTES NFR BLD AUTO: 0.2 %
INR PPP: 1.1
LYMPHOCYTES # BLD AUTO: 1.2 K/UL
LYMPHOCYTES # BLD AUTO: 1.2 K/UL
LYMPHOCYTES # BLD AUTO: 1.5 K/UL
LYMPHOCYTES # BLD AUTO: 1.5 K/UL
LYMPHOCYTES # BLD AUTO: 1.6 K/UL
LYMPHOCYTES # BLD AUTO: 1.7 K/UL
LYMPHOCYTES # BLD AUTO: 1.9 K/UL
LYMPHOCYTES NFR BLD: 29.8 %
LYMPHOCYTES NFR BLD: 29.8 %
LYMPHOCYTES NFR BLD: 31.7 %
LYMPHOCYTES NFR BLD: 31.7 %
LYMPHOCYTES NFR BLD: 32.4 %
LYMPHOCYTES NFR BLD: 33.2 %
LYMPHOCYTES NFR BLD: 36.9 %
MAGNESIUM SERPL-MCNC: 2.1 MG/DL
MCH RBC QN AUTO: 27.5 PG
MCH RBC QN AUTO: 27.5 PG
MCH RBC QN AUTO: 27.8 PG
MCH RBC QN AUTO: 28 PG
MCH RBC QN AUTO: 28 PG
MCH RBC QN AUTO: 28.2 PG
MCH RBC QN AUTO: 28.2 PG
MCHC RBC AUTO-ENTMCNC: 31.2 G/DL
MCHC RBC AUTO-ENTMCNC: 31.2 G/DL
MCHC RBC AUTO-ENTMCNC: 31.7 G/DL
MCHC RBC AUTO-ENTMCNC: 31.7 G/DL
MCHC RBC AUTO-ENTMCNC: 32 G/DL
MCHC RBC AUTO-ENTMCNC: 32.3 G/DL
MCHC RBC AUTO-ENTMCNC: 32.3 G/DL
MCV RBC AUTO: 87 FL
MCV RBC AUTO: 88 FL
MONOCYTES # BLD AUTO: 0.4 K/UL
MONOCYTES # BLD AUTO: 0.5 K/UL
MONOCYTES NFR BLD: 10.6 %
MONOCYTES NFR BLD: 10.6 %
MONOCYTES NFR BLD: 8.3 %
MONOCYTES NFR BLD: 8.3 %
MONOCYTES NFR BLD: 8.6 %
MONOCYTES NFR BLD: 8.7 %
MONOCYTES NFR BLD: 9.1 %
NEUTROPHILS # BLD AUTO: 2.3 K/UL
NEUTROPHILS # BLD AUTO: 2.3 K/UL
NEUTROPHILS # BLD AUTO: 2.4 K/UL
NEUTROPHILS # BLD AUTO: 2.6 K/UL
NEUTROPHILS # BLD AUTO: 2.6 K/UL
NEUTROPHILS # BLD AUTO: 2.7 K/UL
NEUTROPHILS # BLD AUTO: 3.3 K/UL
NEUTROPHILS NFR BLD: 50.4 %
NEUTROPHILS NFR BLD: 54.7 %
NEUTROPHILS NFR BLD: 55.9 %
NEUTROPHILS NFR BLD: 55.9 %
NEUTROPHILS NFR BLD: 56.1 %
NEUTROPHILS NFR BLD: 56.3 %
NEUTROPHILS NFR BLD: 56.3 %
NRBC BLD-RTO: 0 /100 WBC
PHOSPHATE SERPL-MCNC: 3.5 MG/DL
PLATELET # BLD AUTO: 160 K/UL
PLATELET # BLD AUTO: 162 K/UL
PLATELET # BLD AUTO: 163 K/UL
PLATELET # BLD AUTO: 168 K/UL
PLATELET # BLD AUTO: 168 K/UL
PLATELET # BLD AUTO: 170 K/UL
PLATELET # BLD AUTO: 170 K/UL
PMV BLD AUTO: 8.6 FL
PMV BLD AUTO: 8.7 FL
PMV BLD AUTO: 8.7 FL
PMV BLD AUTO: 8.9 FL
PMV BLD AUTO: 8.9 FL
POTASSIUM SERPL-SCNC: 3.6 MMOL/L
PROT SERPL-MCNC: 6.6 G/DL
PROTHROMBIN TIME: 11.2 SEC
RBC # BLD AUTO: 3.53 M/UL
RBC # BLD AUTO: 3.56 M/UL
RBC # BLD AUTO: 3.56 M/UL
RBC # BLD AUTO: 3.57 M/UL
RBC # BLD AUTO: 3.62 M/UL
RBC # BLD AUTO: 3.62 M/UL
RBC # BLD AUTO: 3.75 M/UL
SODIUM SERPL-SCNC: 146 MMOL/L
WBC # BLD AUTO: 4.16 K/UL
WBC # BLD AUTO: 4.16 K/UL
WBC # BLD AUTO: 4.58 K/UL
WBC # BLD AUTO: 4.58 K/UL
WBC # BLD AUTO: 4.71 K/UL
WBC # BLD AUTO: 4.76 K/UL
WBC # BLD AUTO: 5.96 K/UL

## 2018-10-09 PROCEDURE — 85384 FIBRINOGEN ACTIVITY: CPT | Mod: 91

## 2018-10-09 PROCEDURE — 25000003 PHARM REV CODE 250: Performed by: STUDENT IN AN ORGANIZED HEALTH CARE EDUCATION/TRAINING PROGRAM

## 2018-10-09 PROCEDURE — 27200188 HC TRANSDUCER, ART ADULT/PEDS

## 2018-10-09 PROCEDURE — 36415 COLL VENOUS BLD VENIPUNCTURE: CPT

## 2018-10-09 PROCEDURE — 11000001 HC ACUTE MED/SURG PRIVATE ROOM

## 2018-10-09 PROCEDURE — 84100 ASSAY OF PHOSPHORUS: CPT

## 2018-10-09 PROCEDURE — 83735 ASSAY OF MAGNESIUM: CPT

## 2018-10-09 PROCEDURE — 63600175 PHARM REV CODE 636 W HCPCS: Performed by: INTERNAL MEDICINE

## 2018-10-09 PROCEDURE — 63600175 PHARM REV CODE 636 W HCPCS: Mod: JG | Performed by: INTERNAL MEDICINE

## 2018-10-09 PROCEDURE — 25000003 PHARM REV CODE 250: Performed by: INTERNAL MEDICINE

## 2018-10-09 PROCEDURE — 85384 FIBRINOGEN ACTIVITY: CPT

## 2018-10-09 PROCEDURE — 83036 HEMOGLOBIN GLYCOSYLATED A1C: CPT

## 2018-10-09 PROCEDURE — 80053 COMPREHEN METABOLIC PANEL: CPT

## 2018-10-09 PROCEDURE — 94761 N-INVAS EAR/PLS OXIMETRY MLT: CPT

## 2018-10-09 PROCEDURE — 99233 SBSQ HOSP IP/OBS HIGH 50: CPT | Mod: ,,, | Performed by: INTERNAL MEDICINE

## 2018-10-09 PROCEDURE — 85730 THROMBOPLASTIN TIME PARTIAL: CPT | Mod: 91

## 2018-10-09 PROCEDURE — 85610 PROTHROMBIN TIME: CPT

## 2018-10-09 PROCEDURE — 51702 INSERT TEMP BLADDER CATH: CPT

## 2018-10-09 PROCEDURE — 85730 THROMBOPLASTIN TIME PARTIAL: CPT

## 2018-10-09 PROCEDURE — 85025 COMPLETE CBC W/AUTO DIFF WBC: CPT | Mod: 91

## 2018-10-09 RX ORDER — OXYCODONE HYDROCHLORIDE 5 MG/1
5 TABLET ORAL EVERY 6 HOURS PRN
Status: DISCONTINUED | OUTPATIENT
Start: 2018-10-09 | End: 2018-10-10 | Stop reason: HOSPADM

## 2018-10-09 RX ORDER — POTASSIUM CHLORIDE 20 MEQ/1
40 TABLET, EXTENDED RELEASE ORAL ONCE
Status: DISCONTINUED | OUTPATIENT
Start: 2018-10-09 | End: 2018-10-09

## 2018-10-09 RX ORDER — POTASSIUM CHLORIDE 20 MEQ/1
40 TABLET, EXTENDED RELEASE ORAL ONCE
Status: COMPLETED | OUTPATIENT
Start: 2018-10-09 | End: 2018-10-09

## 2018-10-09 RX ADMIN — POTASSIUM CHLORIDE 40 MEQ: 1500 TABLET, EXTENDED RELEASE ORAL at 08:10

## 2018-10-09 RX ADMIN — OXYCODONE HYDROCHLORIDE 5 MG: 5 TABLET ORAL at 08:10

## 2018-10-09 RX ADMIN — SODIUM CHLORIDE 500 ML: 0.9 INJECTION, SOLUTION INTRAVENOUS at 04:10

## 2018-10-09 RX ADMIN — DIPHENHYDRAMINE HYDROCHLORIDE 50 MG: 50 INJECTION, SOLUTION INTRAMUSCULAR; INTRAVENOUS at 12:10

## 2018-10-09 RX ADMIN — DIPHENHYDRAMINE HYDROCHLORIDE 50 MG: 50 INJECTION, SOLUTION INTRAMUSCULAR; INTRAVENOUS at 06:10

## 2018-10-09 RX ADMIN — ALTEPLASE 1 MG/HR: 2.2 INJECTION, POWDER, LYOPHILIZED, FOR SOLUTION INTRAVENOUS at 07:10

## 2018-10-09 RX ADMIN — RIVAROXABAN 15 MG: 15 TABLET, FILM COATED ORAL at 05:10

## 2018-10-09 RX ADMIN — OXYCODONE HYDROCHLORIDE 5 MG: 5 TABLET ORAL at 06:10

## 2018-10-09 RX ADMIN — ACETAMINOPHEN 650 MG: 325 TABLET, FILM COATED ORAL at 12:10

## 2018-10-09 NOTE — ASSESSMENT & PLAN NOTE
63 y.o. female s/p L4-S1 TLIF 9/17/18 by Dr. Bernal, admitted for saddle PE, s/p thrombectomy on heparin gtt.    - Neurologically stable post operatively.  - No concern for post operative hematoma in the setting of heparin gtt.  - Concern for superficial wound dehiscence of surgical wounds. Wound care consult placed. No concern for infectious process at this time. Appreciate recs.  - Will continue to follow.

## 2018-10-09 NOTE — PHARMACY MED REC
"Admission Medication Reconciliation - Pharmacy Consult Note    The home medication history was taken by Tiarra Askew PharmD Based on information gathered and subsequent review by the clinical pharmacist, the items below may need attention.    You may go to "Admission" then "Reconcile Home Medications" tabs to review and/or act upon these items.    No issues noted with the medication reconciliation.    Potential issues to be addressed PRIOR TO DISCHARGE  o Price checking rivaroxaban for patient prior to discharge    Please address this information as you see fit.  Feel free to contact us if you have any questions or require assistance.    Tiarra Askew PharmD  PGY-2 Internal Medicine Pharmacy Resident  [phone]                  .    .            "

## 2018-10-09 NOTE — SUBJECTIVE & OBJECTIVE
Medications Prior to Admission   Medication Sig Dispense Refill Last Dose    furosemide (LASIX) 20 MG tablet Take 20 mg by mouth once daily.   10/8/2018    losartan (COZAAR) 100 MG tablet Take 100 mg by mouth once daily.   10/8/2018    metoprolol succinate (TOPROL-XL) 25 MG 24 hr tablet Take 1 tablet (25 mg total) by mouth 2 (two) times daily. 60 tablet 5 10/8/2018    oxyCODONE-acetaminophen (PERCOCET) 5-325 mg per tablet Take 1 tablet by mouth every 6 (six) hours as needed for Pain. 60 tablet 0 10/8/2018    potassium chloride SA (K-DUR,KLOR-CON) 10 MEQ tablet Take 20 mEq by mouth every evening.   10/8/2018    ranitidine (ZANTAC) 150 MG tablet Take 150 mg by mouth nightly.   10/8/2018    tiZANidine (ZANAFLEX) 4 MG tablet Take 4 mg by mouth every evening.   10/8/2018    topiramate (TOPAMAX) 200 MG Tab Take 1 tablet (200 mg total) by mouth 2 (two) times daily. 180 tablet 3 10/8/2018    oxyCODONE-acetaminophen (PERCOCET) 7.5-325 mg per tablet Take 1 tablet by mouth every 6 (six) hours as needed (pain). 60 tablet 0 Taking       Review of patient's allergies indicates:   Allergen Reactions    Gabapentin Swelling    Vistaril [hydroxyzine hcl] Rash       Past Medical History:   Diagnosis Date    Acute saddle pulmonary embolism with acute cor pulmonale 10/8/2018    Atypical chest pain     Bilateral chronic knee pain     Carpal tunnel syndrome on both sides     Chronic lower back pain     Deep vein thrombosis     History of DVT (deep vein thrombosis)     left leg in the 90s    Hypertension     Osteoarthritis of both knees      Past Surgical History:   Procedure Laterality Date    COLONOSCOPY  2007    Dr Bolton    COLONOSCOPY N/A 12/11/2017    Procedure: COLONOSCOPY;  Surgeon: Shanelle Braga MD;  Location: [facility];  Service: Endoscopy;  Laterality: N/A;    COLONOSCOPY N/A 12/11/2017    [facility]    ENDOMETRIAL ABLATION N/A 2003    FUSION, SPINE,  LUMBAR, TLIF, MINIMALLY INVASIVE @ L4-L5 & L5-S1 N/A 9/17/2018    Performed by Noel [facility]al MD at [facility] OR 2ND FLR    MINIMALLY INVASIVE TRANSFORAMINAL LUMBAR INTERBODY FUSION (TLIF) N/A 9/17/2018    Procedure: FUSION, SPINE, LUMBAR, TLIF, MINIMALLY INVASIVE @ L4-L5 & L5-S1;  Surgeon: Noel Bernal MD;  Location: [facility] OR [address] [place] [address];  Service: Neurosurgery;  Laterality: N/A;    REPLACEMENT-KNEE-TOTAL   Left 11/19/2015    [facility]    SHOULDER ARTHROSCOPY W/ ROTATOR CUFF REPAIR Bilateral 2012, 2013    TOTAL KNEE ARTHROPLASTY Left      Family History     Problem Relation (Age of Onset)    Arthritis Sister, Brother, Sister, Brother    Carpal tunnel syndrome Sister    Diabetes Mother, Brother, Brother, Brother, Brother    DIEGO disease Mother    Heart murmur Daughter    Hypertension Mother    Kidney disease Mother, Brother    No Known Problems Son, Maternal Grandmother, Maternal Grandfather, Paternal Grandmother, Paternal Grandfather        Tobacco Use    Smoking status: Never Smoker    Smokeless tobacco: Never Used   Substance and Sexual Activity    Alcohol use: No     Alcohol/week: 0.0 oz    Drug use: No    Sexual activity: Yes     Partners: Male     Review of Systems   Constitutional: no fever, chills or night sweats. No changes in weight   Eyes: no visual changes   ENT: no nasal congestion or sore throat   Respiratory: no cough or shortness of breath   Cardiovascular: no chest pain or palpitations   Gastrointestinal: no nausea or vomiting   Genitourinary: no hematuria or dysuria   Integument/Breast: no rash or pruritis   Hematologic/Lymphatic: no easy bruising or lymphadenopathy   Musculoskeletal: no arthralgias or myalgias.   Neurological: no seizures or tremors   Behavioral/Psych: no auditory or visual hallucinations   Endocrine: no heat or cold intolerance     Objective:     Weight: 98.9 kg (218 lb 0.6 oz)  Body mass index is 38.62 kg/m².  Vital Signs (Most Recent):  Temp:  "98.7 °F (37.1 °C) (10/09/18 1100)  Pulse: 100 (10/09/18 1100)  Resp: (!) 21 (10/09/18 1100)  BP: 107/64 (10/09/18 1100)  SpO2: 100 % (10/09/18 1100) Vital Signs (24h Range):  Temp:  [98.1 °F (36.7 °C)-99.4 °F (37.4 °C)] 98.7 °F (37.1 °C)  Pulse:  [100-130] 100  Resp:  [21-42] 21  SpO2:  [95 %-100 %] 100 %  BP: ()/(50-92) 107/64     Date 10/09/18 0700 - 10/10/18 0659   Shift 4457-9627 7168-5582 2339-9447 24 Hour Total   INTAKE   I.V.(mL/kg) 282.7(2.9)   282.7(2.9)   IV Piggyback 56.8   56.8   Shift Total(mL/kg) 339.5(3.4)   339.5(3.4)   OUTPUT   Urine(mL/kg/hr) 40   40   Shift Total(mL/kg) 40(0.4)   40(0.4)   Weight (kg) 98.9 98.9 98.9 98.9              Oxygen Concentration (%):  [50] 50         Urethral Catheter 10/08/18 2100 Latex 16 Fr. (Active)   Site Assessment Clean;Intact 10/9/2018 11:00 AM   Collection Container Urimeter 10/9/2018 11:00 AM   Securement Method secured to top of thigh w/ adhesive device 10/9/2018 11:00 AM   Catheter Care Performed yes 10/9/2018 11:00 AM   Reason for Continuing Urinary Catheterization Critically ill in ICU requiring intensive monitoring 10/9/2018 11:00 AM   CAUTI Prevention Bundle StatLock in place w 1" slack;Intact seal between catheter & drainage tubing;Drainage bag off the floor;Green sheeting clip in use;No dependent loops or kinks;Drainage bag not overfilled (<2/3 full);Drainage bag below bladder 10/9/2018  7:01 AM   Output (mL) 40 mL 10/9/2018  8:00 AM       Neurosurgery Physical Exam  General: well developed, well nourished, no distress.   Head: normocephalic, atraumatic  Neurologic: Alert and oriented. Thought content appropriate.  GCS: Motor: 6/Verbal: 5/Eyes: 4 GCS Total: 15  Mental Status: Awake, Alert, Oriented x 4  Cranial nerves: CN II-XII grossly intact.   Eyes: pupils equal, round, reactive to light with accomodation, EOMI.  Pulmonary: no signs of respiratory distress  Abdomen: not tender to palpation  Sensory: intact sensation throughout   Motor Strength: " Moves all extremities spontaneously with good tone. Full strength bilateral upper extremities.  4+/5 strength to bilateral LE, exam pain limited. No abnormal movements seen.   Hallman: absent  Clonus: absent  Babinski: absent    All incisions with superficial dehiscence and granulation tissue. No erythema, drainage to indicate infectious process. Inferior right incision with 3 staples in place.     Significant Labs:  Recent Labs   Lab  10/08/18   1118  10/09/18   0439   GLU  227*  131*   NA  145  146*   K  3.5  3.6   CL  114*  119*   CO2  15*  17*   BUN  11  14   CREATININE  1.0  0.7   CALCIUM  9.9  8.8   MG   --   2.1     Recent Labs   Lab  10/08/18   1802  10/09/18   0032  10/09/18   0439   WBC  6.10  4.76  4.16  4.16   HGB  9.9*  9.8*  10.2*  10.2*   HCT  32.1*  30.6*  31.6*  31.6*   PLT  200  162  170  170     Recent Labs   Lab  10/08/18   1118  10/08/18   1802  10/09/18   0439   INR  1.0  1.1   --    APTT  <21.0  75.4*  22.9  22.9     Microbiology Results (last 7 days)     ** No results found for the last 168 hours. **          Significant Diagnostics:  No new imaging for review.

## 2018-10-09 NOTE — PROGRESS NOTES
Interventional Cardiology Progress Note  Attending Physician: Jigar Orta MD  Hospital Day: 2    Subjective:   Interval History: Patient seen and examined, no events overnight, denied complaints. Denied CP, SOB, Cough, Fever, Chills. Sat well on RA.  Medications:   Continuous Infusions:   sodium chloride 0.9% 30 mL/hr at 10/09/18 0900    alteplase 12.5 mg in 0.9% NaCl 250 mL (CAR/VAS use only) Stopped (10/09/18 0910)    heparin (porcine) in D5W Stopped (10/09/18 0910)       Scheduled Meds:   potassium chloride  40 mEq Oral Once    senna-docusate 8.6-50 mg  1 tablet Oral BID     PRN Meds:acetaminophen, dextrose 50%, dextrose 50%, glucagon (human recombinant), glucose, glucose, heparin (PORCINE), heparin (PORCINE), ondansetron, oxyCODONE, sodium chloride 0.9%  Objective:     Vitals:  Temp:  [98 °F (36.7 °C)-99.4 °F (37.4 °C)]   Pulse:  [104-130]   Resp:  [22-42]   BP: ()/(50-92)   SpO2:  [88 %-100 %]  on RA I/O's:    Intake/Output Summary (Last 24 hours) at 10/9/2018 0926  Last data filed at 10/9/2018 0900  Gross per 24 hour   Intake 934.32 ml   Output 823 ml   Net 111.32 ml        Constitutional: NAD, conversant  HEENT: Sclera anicteric, PERRLA, EOMI  Neck: No JVD, no carotid bruits  CV: RRR, no murmur, normal S1/S2  Pulm: CTAB, no wheezes, rales, or ronchi  GI: Abdomen soft, NTND, +BS  Extremities: No LE edema, warm and well perfused  Skin: No ecchymosis, erythema, or ulcers  Psych: AOx3, appropriate affect  Neuro: CNII-XII intact, no focal deficits    Labs:     Recent Labs   Lab  10/08/18   1118  10/09/18   0439   NA  145  146*   K  3.5  3.6   CL  114*  119*   CO2  15*  17*   BUN  11  14   CREATININE  1.0  0.7   GLU  227*  131*   ANIONGAP  16  10     Recent Labs   Lab  10/08/18   1118  10/09/18   0439   AST  34  47*   ALT  32  79*   ALKPHOS  138*  131   BILITOT  0.5  0.4   ALBUMIN  3.8  3.3*      Recent Labs   Lab  10/08/18   1802  10/09/18   0032  10/09/18   0439   WBC  6.10  4.76  4.16   4.16   HGB  9.9*  9.8*  10.2*  10.2*   HCT  32.1*  30.6*  31.6*  31.6*   PLT  200  162  170  170   GRAN  69.1  4.2  56.1  2.7  56.3  56.3  2.3  2.3     Recent Labs   Lab  10/08/18   1118  10/08/18   1802   INR  1.0  1.1     Recent Labs   Lab  10/08/18   1118  10/08/18   1434   TROPONINI  0.007  1.170*   BNP  115*   --       Micro:   Blood Cultures  Lab Results   Component Value Date    LABBLOO No growth after 5 days. 09/18/2018    LABBLOO No growth after 5 days. 07/20/2018    LABBLOO No growth after 5 days. 07/20/2018    LABBLOO No growth after 5 days. 03/18/2016    LABBLOO No growth after 5 days. 03/18/2016     Urine Cultures  Lab Results   Component Value Date    LABURIN ESCHERICHIA COLI ESBL  10,000 - 49,999 cfu/ml   09/18/2018    LABURIN  07/19/2018     Multiple organisms isolated. None in predominance.  Repeat if    LABURIN clinically necessary. 07/19/2018    LABURIN No significant growth 11/04/2015       Imaging:     EF   Date Value Ref Range Status   10/08/2018 73 55 - 65        Assessment&Plan:     -Breathing better today and sat 100% on RA.  -PA pressure this morning 30/5 mmHg.   -Pig tail catheter removed at 9:00 am.   -Continue anticoagulations.  -Further care per primary team.   -Discussed with CCU staff and fellow.   -Please call interventional cardiology for any question.     Attending addendum to follow     Miryam Mcrae MD  Cardiology Fellow  Pager: [phone]

## 2018-10-09 NOTE — PROGRESS NOTES
0710: Bedside handoff.  TPA and heparin gtts stopped.  Dr. Araiza notified and states to restart TPA and heparin with 1x heparin bolus admininstered as well.

## 2018-10-09 NOTE — HPI
Rosa Fermin is a 63 y.o. female s/p L4-S1 TLIF with Dr. Bernal 9/17/18 with history of htn, DVT in 1997 who [place] [facility] [place] of breath while ambulating to her follow up appointment with Dr. Bernal. A CTA of the chest revealed saddle PE. She is now s/p heparin gtt and catheter directed thrombolysis 10/8/18 per cardiology. BLE US also reveals L popliteal partially occluded DVT.    Of note, patient has a history of DVT in her left leg diagnosed in 1997. The clot happened after she had been immobile for a period of time after an ankle sprain. She was placed on coumadin for three years, and was eventually taken off of it; she is currently on no anticoagulation.

## 2018-10-09 NOTE — NURSING
Cards Team notified of fibrinogin level of 264. Current TPA order clarified and order received to stop gtt and recheck fibrinogen level in 1 hr.

## 2018-10-09 NOTE — PROVIDER PROGRESS NOTES - EMERGENCY DEPT.
Encounter Date: 10/8/2018    ED Physician Progress Notes        Physician Note:   Echo by cardiology fellow no R heart strain but given O2 requirment, clot burden, decision was made to take pt to cath lab for intra-arterial TPA/Catherter placement. Will admit to CCU.

## 2018-10-09 NOTE — PROGRESS NOTES
Cardiology On Call     Radiology informed me that pt has left popliteal partially occluded DVT. Pt already on systemic heparin drip.    Roslyn Araiza, PGY-4 (Cardiology Fellow)

## 2018-10-09 NOTE — NURSING
Cards Team notified of 0030 fibrinogin level and current TPA infusion rate. No new orders @ this time.

## 2018-10-09 NOTE — PLAN OF CARE
Hospital Medicine Acceptance Note      Date of Admit: 10/8/2018  Date of Transfer / Stepdown Request: 10/9/2018     Brief HPI and Hospital Course  Patient is a 63 year old female with HTN, chronic low back and knee pain, and history of DVT (in 1997) that presents to hospital with complaint of shortness of breath. She was taken to the emergency department and CT chest showed a bilateral pulmonary embolus. She was started on a heparin drip and underwent cathter directed thrombolysis on 10/08. She was started on alteplase and continued on heparin after the procedure. Initiating Xarelto this evening. Currently stable, no longer on oxygen at rest.      Active problems  Acute pulmonary embolism    Plan  Start NOAC  PT/OT evaluation  6 minute walk test      Discussed case with CCU staff Dr Orta.  She is still physically in ICU, thus CCU team to remain as primary until she arrives to the floor. Patient to be transferred to any general medicine service, does not need to be IM-C/J.         Tiarra Moss MD  [facility]

## 2018-10-09 NOTE — NURSING TRANSFER
Nursing Transfer Note      10/9/2018     Transfer To: [id number]    Transfer via bed    Transfer with cardiac monitoring    Transported by RN    Medicines sent: heparin gtt    Chart send with patient: Yes    Notified: daughter    Patient reassessed at: upon arrival by RN Kaylan (date, time)    Upon arrival to floor: cardiac monitor applied, patient oriented to room, call bell in reach and bed in lowest position

## 2018-10-09 NOTE — CONSULTS
[facility]  Neurosurgery  Consult Note    Consults  Subjective:     Chief Complaint/Reason for Admission: s/p TLIF, saddle PE, wound dehiscence    History of Present Illness: Rosa Fermin is a 63 y.o. female s/p L4-S1 TLIF with Dr. Beranl 9/17/18 with history of htn, DVT in 1997 who [place] [facility] [place] with shortness of breath while ambulating to her follow up appointment with Dr. Bernal. A CTA of the chest revealed saddle PE. She is now s/p heparin gtt and catheter directed thrombolysis 10/8/18 per cardiology. BLE US also reveals L popliteal partially occluded DVT.    Of note, patient has a history of DVT in her left leg diagnosed in 1997. The clot happened after she had been immobile for a period of time after an ankle sprain. She was placed on coumadin for three years, and was eventually taken off of it; she is currently on no anticoagulation.        Medications Prior to Admission   Medication Sig Dispense Refill Last Dose    furosemide (LASIX) 20 MG tablet Take 20 mg by mouth once daily.   10/8/2018    losartan (COZAAR) 100 MG tablet Take 100 mg by mouth once daily.   10/8/2018    metoprolol succinate (TOPROL-XL) 25 MG 24 hr tablet Take 1 tablet (25 mg total) by mouth 2 (two) times daily. 60 tablet 5 10/8/2018    oxyCODONE-acetaminophen (PERCOCET) 5-325 mg per tablet Take 1 tablet by mouth every 6 (six) hours as needed for Pain. 60 tablet 0 10/8/2018    potassium chloride SA (K-DUR,KLOR-CON) 10 MEQ tablet Take 20 mEq by mouth every evening.   10/8/2018    ranitidine (ZANTAC) 150 MG tablet Take 150 mg by mouth nightly.   10/8/2018    tiZANidine (ZANAFLEX) 4 MG tablet Take 4 mg by mouth every evening.   10/8/2018    topiramate (TOPAMAX) 200 MG Tab Take 1 tablet (200 mg total) by mouth 2 (two) times daily. 180 tablet 3 10/8/2018    oxyCODONE-acetaminophen (PERCOCET) 7.5-325 mg per tablet Take 1 tablet by mouth every 6 (six) hours as needed (pain). 60 tablet 0 Taking       Review  of patient's allergies indicates:   Allergen Reactions    Gabapentin Swelling    Vistaril [hydroxyzine hcl] Rash       Past Medical History:   Diagnosis Date    Acute saddle pulmonary embolism with acute cor pulmonale 10/8/2018    Atypical chest pain     Bilateral chronic knee pain     Carpal tunnel syndrome on both sides     Chronic lower back pain     Deep vein thrombosis     History of DVT (deep vein thrombosis)     left leg in the 90s    Hypertension     Osteoarthritis of both knees      Past Surgical History:   Procedure Laterality Date    COLONOSCOPY  2007    Dr Bolton    COLONOSCOPY N/A 12/11/2017    Procedure: COLONOSCOPY;  Surgeon: Shanelle Braga MD;  Location: [facility];  Service: Endoscopy;  Laterality: N/A;    COLONOSCOPY N/A 12/11/2017    Performed by Shanelle Braga MD at [facility]    ENDOMETRIAL ABLATION N/A 2003    FUSION, SPINE, LUMBAR, TLIF, MINIMALLY INVASIVE @ L4-L5 & L5-S1 N/A 9/17/2018    [facility]    MINIMALLY INVASIVE TRANSFORAMINAL LUMBAR INTERBODY FUSION (TLIF) N/A 9/17/2018    Procedure: FUSION, SPINE, LUMBAR, TLIF, MINIMALLY INVASIVE @ L4-L5 & L5-S1;  Surgeon: Noel Bernal MD;  Location: [facility] OR [address] [place] [address];  Service: Neurosurgery;  Laterality: N/A;    REPLACEMENT-KNEE-TOTAL   Left 11/19/2015    Performed by Mitch Mujica [facility] at [facility] OR    SHOULDER ARTHROSCOPY W/ ROTATOR CUFF REPAIR Bilateral 2012, 2013    TOTAL KNEE ARTHROPLASTY Left      Family History     Problem Relation (Age of Onset)    Arthritis Sister, Brother, Sister, Brother    Carpal tunnel syndrome Sister    Diabetes Mother, Brother, Brother, Brother, Brother    DIEGO disease Mother    Heart murmur Daughter    Hypertension Mother    Kidney disease Mother, Brother    No Known Problems Son, Maternal Grandmother, Maternal Grandfather, Paternal Grandmother, Paternal Grandfather        Tobacco Use    Smoking status: Never Smoker    Smokeless tobacco:  Never Used   Substance and Sexual Activity    Alcohol use: No     Alcohol/week: 0.0 oz    Drug use: No    Sexual activity: Yes     Partners: Male     Review of Systems   Constitutional: no fever, chills or night sweats. No changes in weight   Eyes: no visual changes   ENT: no nasal congestion or sore throat   Respiratory: no cough or shortness of breath   Cardiovascular: no chest pain or palpitations   Gastrointestinal: no nausea or vomiting   Genitourinary: no hematuria or dysuria   Integument/Breast: no rash or pruritis   Hematologic/Lymphatic: no easy bruising or lymphadenopathy   Musculoskeletal: no arthralgias or myalgias.   Neurological: no seizures or tremors   Behavioral/Psych: no auditory or visual hallucinations   Endocrine: no heat or cold intolerance     Objective:     Weight: 98.9 kg (218 lb 0.6 oz)  Body mass index is 38.62 kg/m².  Vital Signs (Most Recent):  Temp: 98.7 °F (37.1 °C) (10/09/18 1100)  Pulse: 100 (10/09/18 1100)  Resp: (!) 21 (10/09/18 1100)  BP: 107/64 (10/09/18 1100)  SpO2: 100 % (10/09/18 1100) Vital Signs (24h Range):  Temp:  [98.1 °F (36.7 °C)-99.4 °F (37.4 °C)] 98.7 °F (37.1 °C)  Pulse:  [100-130] 100  Resp:  [21-42] 21  SpO2:  [95 %-100 %] 100 %  BP: ()/(50-92) 107/64     Date 10/09/18 0700 - 10/10/18 0659   Shift 2351-2455 8871-8804 3901-9831 24 Hour Total   INTAKE   I.V.(mL/kg) 282.7(2.9)   282.7(2.9)   IV Piggyback 56.8   56.8   Shift Total(mL/kg) 339.5(3.4)   339.5(3.4)   OUTPUT   Urine(mL/kg/hr) 40   40   Shift Total(mL/kg) 40(0.4)   40(0.4)   Weight (kg) 98.9 98.9 98.9 98.9              Oxygen Concentration (%):  [50] 50         Urethral Catheter 10/08/18 2100 Latex 16 Fr. (Active)   Site Assessment Clean;Intact 10/9/2018 11:00 AM   Collection Container Urimeter 10/9/2018 11:00 AM   Securement Method secured to top of thigh w/ adhesive device 10/9/2018 11:00 AM   Catheter Care Performed yes 10/9/2018 11:00 AM   Reason for Continuing Urinary Catheterization Critically  "ill in ICU requiring intensive monitoring 10/9/2018 11:00 AM   CAUTI Prevention Bundle StatLock in place w 1" slack;Intact seal between catheter & drainage tubing;Drainage bag off the floor;Green sheeting clip in use;No dependent loops or kinks;Drainage bag not overfilled (<2/3 full);Drainage bag below bladder 10/9/2018  7:01 AM   Output (mL) 40 mL 10/9/2018  8:00 AM       Neurosurgery Physical Exam  General: well developed, well nourished, no distress.   Head: normocephalic, atraumatic  Neurologic: Alert and oriented. Thought content appropriate.  GCS: Motor: 6/Verbal: 5/Eyes: 4 GCS Total: 15  Mental Status: Awake, Alert, Oriented x 4  Cranial nerves: CN II-XII grossly intact.   Eyes: pupils equal, round, reactive to light with accomodation, EOMI.  Pulmonary: no signs of respiratory distress  Abdomen: not tender to palpation  Sensory: intact sensation throughout   Motor Strength: Moves all extremities spontaneously with good tone. Full strength bilateral upper extremities.  4+/5 strength to bilateral LE, exam pain limited. No abnormal movements seen.   Hallman: absent  Clonus: absent  Babinski: absent    All incisions with superficial dehiscence and granulation tissue. No erythema, drainage to indicate infectious process. Inferior right incision with 3 staples in place.     Significant Labs:  Recent Labs   Lab  10/08/18   1118  10/09/18   0439   GLU  227*  131*   NA  145  146*   K  3.5  3.6   CL  114*  119*   CO2  15*  17*   BUN  11  14   CREATININE  1.0  0.7   CALCIUM  9.9  8.8   MG   --   2.1     Recent Labs   Lab  10/08/18   1802  10/09/18   0032  10/09/18   0439   WBC  6.10  4.76  4.16  4.16   HGB  9.9*  9.8*  10.2*  10.2*   HCT  32.1*  30.6*  31.6*  31.6*   PLT  200  162  170  170     Recent Labs   Lab  10/08/18   1118  10/08/18   1802  10/09/18   0439   INR  1.0  1.1   --    APTT  <21.0  75.4*  22.9  22.9     Microbiology Results (last 7 days)     ** No results found for the last 168 hours. **    "       Significant Diagnostics:  No new imaging for review.    Assessment/Plan:     Lumbar spondylosis    63 y.o. female s/p L4-S1 TLIF 9/17/18 by Dr. Bernal, admitted for saddle PE, s/p thrombectomy on heparin gtt.    - Neurologically stable post operatively.  - No concern for post operative hematoma in the setting of heparin gtt.  - Concern for superficial wound dehiscence of surgical wounds. Wound care consult placed. No concern for infectious process at this time. Appreciate recs.  - Will continue to follow.               Shelly Montgomery PA-C  Neurosurgery  [facility]

## 2018-10-10 VITALS
HEART RATE: 105 BPM | TEMPERATURE: 100 F | BODY MASS INDEX: 38.64 KG/M2 | SYSTOLIC BLOOD PRESSURE: 159 MMHG | HEIGHT: 63 IN | WEIGHT: 218.06 LBS | DIASTOLIC BLOOD PRESSURE: 86 MMHG | OXYGEN SATURATION: 98 % | RESPIRATION RATE: 20 BRPM

## 2018-10-10 PROBLEM — T81.31XA DEHISCENCE OF INCISION: Status: ACTIVE | Noted: 2018-10-10

## 2018-10-10 LAB
ALBUMIN SERPL BCP-MCNC: 3 G/DL
ALP SERPL-CCNC: 120 U/L
ALT SERPL W/O P-5'-P-CCNC: 50 U/L
ANION GAP SERPL CALC-SCNC: 9 MMOL/L
APTT BLDCRRT: 24.8 SEC
AST SERPL-CCNC: 20 U/L
BASOPHILS # BLD AUTO: 0.02 K/UL
BASOPHILS NFR BLD: 0.4 %
BILIRUB SERPL-MCNC: 0.4 MG/DL
BUN SERPL-MCNC: 11 MG/DL
CALCIUM SERPL-MCNC: 8.6 MG/DL
CHLORIDE SERPL-SCNC: 118 MMOL/L
CO2 SERPL-SCNC: 17 MMOL/L
CREAT SERPL-MCNC: 0.7 MG/DL
DIFFERENTIAL METHOD: ABNORMAL
EOSINOPHIL # BLD AUTO: 0.2 K/UL
EOSINOPHIL NFR BLD: 4.9 %
ERYTHROCYTE [DISTWIDTH] IN BLOOD BY AUTOMATED COUNT: 17.1 %
EST. GFR  (AFRICAN AMERICAN): >60 ML/MIN/1.73 M^2
EST. GFR  (NON AFRICAN AMERICAN): >60 ML/MIN/1.73 M^2
FIBRINOGEN PPP-MCNC: 320 MG/DL
FIBRINOGEN PPP-MCNC: 355 MG/DL
FIBRINOGEN PPP-MCNC: 384 MG/DL
GLUCOSE SERPL-MCNC: 106 MG/DL
HCT VFR BLD AUTO: 31.3 %
HGB BLD-MCNC: 9.8 G/DL
IMM GRANULOCYTES # BLD AUTO: 0.01 K/UL
IMM GRANULOCYTES NFR BLD AUTO: 0.2 %
LYMPHOCYTES # BLD AUTO: 1.4 K/UL
LYMPHOCYTES NFR BLD: 30.9 %
MAGNESIUM SERPL-MCNC: 1.9 MG/DL
MCH RBC QN AUTO: 27.9 PG
MCHC RBC AUTO-ENTMCNC: 31.3 G/DL
MCV RBC AUTO: 89 FL
MONOCYTES # BLD AUTO: 0.4 K/UL
MONOCYTES NFR BLD: 9.9 %
NEUTROPHILS # BLD AUTO: 2.4 K/UL
NEUTROPHILS NFR BLD: 53.7 %
NRBC BLD-RTO: 0 /100 WBC
PHOSPHATE SERPL-MCNC: 2.8 MG/DL
PLATELET # BLD AUTO: 175 K/UL
PMV BLD AUTO: 8.8 FL
POTASSIUM SERPL-SCNC: 3.6 MMOL/L
PROT SERPL-MCNC: 6.3 G/DL
RBC # BLD AUTO: 3.51 M/UL
SODIUM SERPL-SCNC: 144 MMOL/L
WBC # BLD AUTO: 4.46 K/UL

## 2018-10-10 PROCEDURE — 99239 HOSP IP/OBS DSCHRG MGMT >30: CPT | Mod: ,,, | Performed by: INTERNAL MEDICINE

## 2018-10-10 PROCEDURE — 25000003 PHARM REV CODE 250: Performed by: STUDENT IN AN ORGANIZED HEALTH CARE EDUCATION/TRAINING PROGRAM

## 2018-10-10 PROCEDURE — 85384 FIBRINOGEN ACTIVITY: CPT | Mod: 91

## 2018-10-10 PROCEDURE — 97535 SELF CARE MNGMENT TRAINING: CPT

## 2018-10-10 PROCEDURE — 97161 PT EVAL LOW COMPLEX 20 MIN: CPT

## 2018-10-10 PROCEDURE — 90471 IMMUNIZATION ADMIN: CPT | Performed by: INTERNAL MEDICINE

## 2018-10-10 PROCEDURE — 97165 OT EVAL LOW COMPLEX 30 MIN: CPT

## 2018-10-10 PROCEDURE — 85730 THROMBOPLASTIN TIME PARTIAL: CPT

## 2018-10-10 PROCEDURE — 36415 COLL VENOUS BLD VENIPUNCTURE: CPT

## 2018-10-10 PROCEDURE — 99024 POSTOP FOLLOW-UP VISIT: CPT | Mod: ,,, | Performed by: PHYSICIAN ASSISTANT

## 2018-10-10 PROCEDURE — 63600175 PHARM REV CODE 636 W HCPCS: Performed by: INTERNAL MEDICINE

## 2018-10-10 PROCEDURE — 85025 COMPLETE CBC W/AUTO DIFF WBC: CPT

## 2018-10-10 PROCEDURE — 83735 ASSAY OF MAGNESIUM: CPT

## 2018-10-10 PROCEDURE — 90686 IIV4 VACC NO PRSV 0.5 ML IM: CPT | Performed by: INTERNAL MEDICINE

## 2018-10-10 PROCEDURE — 80053 COMPREHEN METABOLIC PANEL: CPT

## 2018-10-10 PROCEDURE — 84100 ASSAY OF PHOSPHORUS: CPT

## 2018-10-10 RX ADMIN — RIVAROXABAN 15 MG: 15 TABLET, FILM COATED ORAL at 09:10

## 2018-10-10 RX ADMIN — INFLUENZA A VIRUS A/MICHIGAN/45/2015 X-275 (H1N1) ANTIGEN (FORMALDEHYDE INACTIVATED), INFLUENZA A VIRUS A/SINGAPORE/INFIMH-16-0019/2016 IVR-186 (H3N2) ANTIGEN (FORMALDEHYDE INACTIVATED), INFLUENZA B VIRUS B/PHUKET/3073/2013 ANTIGEN (FORMALDEHYDE INACTIVATED), AND INFLUENZA B VIRUS B/MARYLAND/15/2016 BX-69A ANTIGEN (FORMALDEHYDE INACTIVATED) 0.5 ML: 15; 15; 15; 15 INJECTION, SUSPENSION INTRAMUSCULAR at 04:10

## 2018-10-10 RX ADMIN — OXYCODONE HYDROCHLORIDE 5 MG: 5 TABLET ORAL at 09:10

## 2018-10-10 NOTE — HOSPITAL COURSE
10/9- Patient feels much improved today. She is currently on room air and has saturations above 95%. Patient was started on Xeralto 15 mg BID. Will transfer to medical floor prior to discharge.

## 2018-10-10 NOTE — ASSESSMENT & PLAN NOTE
Patient was found to have extensive bilateral pulmonary thromboembolism with linear saddle component on chest CT. Pulmonary embolism was diagnosed as a submassive PE due to right heart strain, as well as elevated BNP (>90). She was started on a heparin drip (18 units/kg/hr, at 13.2 ml/hr).    - Went for catheter directed thrombolysis.  - Started on Xeralto 15 mg BID  - Interventional cardiology following.

## 2018-10-10 NOTE — PHYSICIAN QUERY
PT Name: Rosa Fermin  MR #: [id number]     Physician Query Form - Documentation Clarification      CDS/: Karolina Ram RN CDI     Contact information: [email or web address]    This form is a permanent document in the medical record.     Query Date: October 10, 2018    By submitting this query, we are merely seeking further clarification of documentation. Please utilize your independent clinical judgment when addressing the question(s) below.    The Medical record reflects the following:    Supporting Clinical Findings Location in Medical Record   BMI - 40.8  (5ft. 3 in. - 230 lbs.)     Initial/er vitals                                                                             Doctor, Please specify diagnosis or diagnoses associated with above clinical findings.        (x  )  Morbid obesity (*BMI >40) (Specify cause):                      [  ] Due to excess calories   [  ] Other, specify__________                        [ x ] Unspecified    (  ) Other, please specify_________________                                                                                                             [  ] Clinically undetermined

## 2018-10-10 NOTE — PLAN OF CARE
Problem: Physical Therapy Goal  Goal: Physical Therapy Goal  Goals to be met by: 10/20/2018     Patient will increase functional independence with mobility by performin. Supine to sit with Supervision  2. Sit to supine with Supervision  3. Sit to stand transfer with Supervision  4. Gait  x 200 feet with Supervision using Rolling Walker.   5. Lower extremity exercise program x15 reps per handout, with supervision    Outcome: Ongoing (interventions implemented as appropriate)  Pt evaluation complete; pt goals set.    JOHN CAUSEY, PT  10/10/2018

## 2018-10-10 NOTE — PROGRESS NOTES
[facility]  Neurosurgery  Progress Note    Subjective:     History of Present Illness: Rosa Fermin is a 63 y.o. female s/p L4-S1 TLIF with Dr. Bernal 9/17/18 with history of htn, DVT in 1997 who presented to [facility] ED with shortness of breath while ambulating to her follow up appointment with Dr. Bernal. A CTA of the chest revealed saddle PE. She is now s/p heparin gtt and catheter directed thrombolysis 10/8/18 per cardiology. BLE US also reveals L popliteal partially occluded DVT.    Of note, patient has a history of DVT in her left leg diagnosed in 1997. The clot happened after she had been immobile for a period of time after an ankle sprain. She was placed on coumadin for three years, and was eventually taken off of it; she is currently on no anticoagulation.        Post-Op Info:  Procedure(s) (LRB):  THROMBECTOMY (Bilateral)         Interval History: Reports right knee pain. Denies back pain, numbness/tingling, weakness, fever/chills. Tolerating diet. S/P thrombectomy 10/8.    Medications:  Continuous Infusions:  Scheduled Meds:   rivaroxaban  15 mg Oral BID WM    senna-docusate 8.6-50 mg  1 tablet Oral BID     PRN Meds:acetaminophen, dextrose 50%, dextrose 50%, glucagon (human recombinant), glucose, glucose, influenza, ondansetron, oxyCODONE, sodium chloride 0.9%     Review of Systems  Objective:     Weight: 98.9 kg (218 lb 0.6 oz)  Body mass index is 38.62 kg/m².  Vital Signs (Most Recent):  Temp: 99.5 °F (37.5 °C) (10/10/18 0750)  Pulse: 105 (10/10/18 1507)  Resp: 20 (10/10/18 1316)  BP: (!) 159/86 (10/10/18 1316)  SpO2: 98 % (10/10/18 1316) Vital Signs (24h Range):  Temp:  [99.5 °F (37.5 °C)-99.7 °F (37.6 °C)] 99.5 °F (37.5 °C)  Pulse:  [100-105] 105  Resp:  [20-24] 20  SpO2:  [96 %-98 %] 98 %  BP: (115-165)/(66-90) 159/86                           Neurosurgery Physical Exam  General: well developed, well nourished, no distress.   Head: normocephalic, atraumatic  Neurologic: Alert  and oriented. Thought content appropriate.  GCS: Motor: 6/Verbal: 5/Eyes: 4 GCS Total: 15  Mental Status: Awake, Alert, Oriented x 4  Language: No aphasia  Speech: No dysarthria  Cranial nerves: face symmetric, tongue midline, CN II-XII grossly intact.   Eyes: pupils equal, round, reactive to light with accomodation, EOMI.   Pulmonary: normal respirations, no signs of respiratory distress  Abdomen: soft, non-distended, not tender to palpation  Sensory: intact to light touch throughout    Motor Strength:Moves all extremities spontaneously with good tone. No abnormal movements seen.     Strength  Deltoids Triceps Biceps Wrist Extension Wrist Flexion Hand    Upper: R 5/5 5/5 5/5 5/5 5/5 5/5    L 5/5 5/5 5/5 5/5 5/5 5/5     Iliopsoas Quadriceps Knee  Flexion Tibialis  anterior Gastro- cnemius EHL   Lower: R 4+/5 5/5 5/5 5/5 5/5 5/5    L 4+/5 5/5 5/5 5/5 5/5 5/5     Hallman: absent  Clonus: absent  Babinski: absent  Skin: Skin is warm, dry and intact.  All incisions with superficial dehiscence and granulation tissue. No erythema, drainage to indicate infectious process. Inferior right incision with 3 staples in place.          Significant Labs:  Recent Labs   Lab  10/09/18   0439  10/10/18   0646   GLU  131*  106   NA  146*  144   K  3.6  3.6   CL  119*  118*   CO2  17*  17*   BUN  14  11   CREATININE  0.7  0.7   CALCIUM  8.8  8.6*   MG  2.1  1.9     Recent Labs   Lab  10/09/18   1643  10/09/18   2332  10/10/18   0646   WBC  4.71  5.96  4.46   HGB  10.0*  10.5*  9.8*   HCT  31.5*  33.1*  31.3*   PLT  160  163  175     Recent Labs   Lab  10/08/18   1802  10/09/18   0439  10/09/18   1455  10/10/18   0646   INR  1.1   --   1.1   --    APTT  75.4*  22.9  22.9  37.8*  24.8     Microbiology Results (last 7 days)     ** No results found for the last 168 hours. **        Recent Lab Results       10/10/18  1117 10/10/18  0646 10/09/18  2332 10/09/18  1643      Immature Granulocytes  0.2 0.2 0.0     Immature Grans (Abs)   0.01  Comment:  Mild elevation in immature granulocytes is non specific and   can be seen in a variety of conditions including stress response,   acute inflammation, trauma and pregnancy. Correlation with other   laboratory and clinical findings is essential.   0.01  Comment:  Mild elevation in immature granulocytes is non specific and   can be seen in a variety of conditions including stress response,   acute inflammation, trauma and pregnancy. Correlation with other   laboratory and clinical findings is essential.   0.00  Comment:  Mild elevation in immature granulocytes is non specific and   can be seen in a variety of conditions including stress response,   acute inflammation, trauma and pregnancy. Correlation with other   laboratory and clinical findings is essential.       Albumin  3.0       Alkaline Phosphatase  120       ALT  50       Anion Gap  9       aPTT  24.8  Comment:  aPTT therapeutic range = 39-69 seconds       AST  20       Baso #  0.02 0.02 0.01     Basophil%  0.4 0.3 0.2     Total Bilirubin  0.4  Comment:  For infants and newborns, interpretation of results should be based  on gestational age, weight and in agreement with clinical  observations.  Premature Infant recommended reference ranges:  Up to 24 hours.............<8.0 mg/dL  Up to 48 hours............<12.0 mg/dL  3-5 days..................<15.0 mg/dL  6-29 days.................<15.0 mg/dL         BUN, Bld  11       Calcium  8.6       Chloride  118       CO2  17       Creatinine  0.7       Differential Method  Automated Automated Automated     eGFR if   >60.0       eGFR if non   >60.0  Comment:  Calculation used to obtain the estimated glomerular filtration  rate (eGFR) is the CKD-EPI equation.          Eos #  0.2 0.2 0.2     Eosinophil%  4.9 3.7 3.4     Fibrinogen 384 355 320 313     Glucose  106       Gran # (ANC)  2.4 3.3 2.4     Gran%  53.7 54.7 50.4     Hematocrit  31.3 33.1 31.5     Hemoglobin  9.8 10.5  10.0     Lymph #  1.4 1.9 1.7     Lymph%  30.9 32.4 36.9     Magnesium  1.9       MCH  27.9 28.0 28.0     MCHC  31.3 31.7 31.7     MCV  89 88 88     Mono #  0.4 0.5 0.4     Mono%  9.9 8.7 9.1     MPV  8.8 8.7 8.7     nRBC  0 0 0     Phosphorus  2.8       Platelets  175 163 160     Potassium  3.6       Total Protein  6.3       RBC  3.51 3.75 3.57     RDW  17.1 17.1 17.2     Sodium  144       WBC  4.46 5.96 4.71         All pertinent labs from the last 24 hours have been reviewed.    Significant Diagnostics:  No new imaging    Assessment/Plan:     Lumbar spondylosis    63 y.o. female s/p L4-S1 TLIF 9/17/18 by Dr. Bernal, admitted for saddle PE, s/p thrombectomy on heparin gtt.    - Neurologically stable post operatively.  - No concern for post operative hematoma in the setting of heparin gtt.  - Concern for superficial wound dehiscence of surgical wounds. Wound care consult placed, appreciate recs. Per chart review, recs are daily dressing changes. Cleanse wound with normal saline or wound care spray. Skin prep around randi wound. Apply santyl cover with moistened gauze and then bordered gauze. Staples removed. No concern for infectious process at this time.   - Continue medical management per primary team  - Will continue to follow.             Sara Sidhu PA-C  Neurosurgery  [facility]

## 2018-10-10 NOTE — PROGRESS NOTES
Wound care consult received from MD team for dehiscence of lumbar surgical incisions.  Pt with PMH of with HTN, chronic low back and knee pain, and history of DVT (in 1997) with recent laminectomy, diskectomy, and spinal fusion on 9/17/2018.  Upon assessment, noted 3 dehisced small incisions with one almost healed incision. The 3 incisions open are with 100% slough.    Discussed recommendations with pt and Dr. Fuentes.  Pt reports her  has been providing wound care with daily MediHoney dressing changes prior to admission.   Recommendations:  Begin daily dressing changes with Santyl ointment to slough, covered with moistened saline gauze and skin prep to periwound. Cover with bordered gauze dressing.  [id number]     10/10/18 1328       Incision/Site 09/17/18 1247 Back   Date First Assessed/Time First Assessed: 09/17/18 1247   Location: Back   Wound Image    Incision WDL ex   Dressing Appearance Open to air   Drainage Amount Scant   Drainage Characteristics/Odor Serosanguineous   Appearance Slough;Pink   Red (%), Wound Tissue Color 10 %   Yellow (%), Wound Tissue Color 90 %   Periwound Area Intact   Wound Edges Open   Wound Length (cm) 5 cm   Wound Width (cm) 7 cm   Wound Depth (cm) 0.3 cm   Wound Volume (cm^3) 10.5 cm^3   Care Cleansed with:;Sterile normal saline   Dressing Applied;Foam  (ordered Santyl)   Dressing Change Due 10/10/18

## 2018-10-10 NOTE — MEDICAL/APP STUDENT
Discharge Summary  Hospital Medicine    Attending Provider on Discharge: Stacey Fuentes MD  [facility] Medicine Team: [facility]  Date of Admission:  10/8/2018     Date of Discharge:  10/10/2018  Code status: Full Code    Active Hospital Problems    Diagnosis  POA    *Acute saddle pulmonary embolism with acute cor pulmonale [I26.02]  Yes    Acute saddle pulmonary embolism [I26.92]  Yes    Lumbar spondylosis [M47.816]  Yes    History of DVT (deep vein thrombosis)- in 1997 - Provoked DVT [Z86.718]  Not Applicable    HTN (hypertension) [I10]  Yes      Resolved Hospital Problems   No resolved problems to display.     HPI  Patient is a 63 year old female with HTN, chronic low back and knee pain, and history of DVT (in 1997) that presents to hospital with complaint of shortness of breath. She reports that her problems started she was walking through the [facility] parking lot to come see Dr. Bernal. She had a laminectomy, diskectomy, and spinal fusion on 9/17/2018 and she was coming in on 10/8 for a follow up appointment. While walking, she suddenly felt very short of breath. She denies any chest pain, syncope, or palpitations during this time. She asked her daughter who was with her to get her a wheel chair so she could be brought into hospital. Even while seated she remained short of breath. She arrived to the emergency department where she underwent a CT scan that showed extensive bilateral pulmonary thromboembolism with linear saddle component. Her troponin was negative and BNP was 115. TTE at bedside showed evidence of RV strain, Orozco's sign, mid and apical RV hypokinesis with mildly depressed RV function, PASP 40, hyperdynamic LVEF (>73%). She was started on a heparin drip, and cardiology was called.     Of note, patient has a history of DVT in her left leg diagnosed in 1997. The clot happened after she had been immobile for a period of time after an ankle sprain. She was placed on coumadin for three years,  and was eventually taken off of it; she is currently on no anticoagulation. Patient reports feeling less mobile since her surgery on 9/17, but reports that she still ambulates around her home. She denies any history of travel, smoking history, diabetes, or hyperlipidemia. At time of the exam, patient only complained of shortness of breath which was improved on her oxygen as well as some right leg pain and swelling. She is not aware of any history of clotting problems in her family or any autoimmune diseases.      Spoke to patient about her code status. She informed us that she would like to be full code.     Hospital Course  Patient was found to have RV strain after chest CT scan showed extensive bilateral pulmonary thromboembolism with linea saddle component. She was admitted to cardiac critical care unit, placed on a heparin gtt and underwent catheter directed thrombolysis by interventional cardiology on 10/8/2018. She was also found to have left popliteal DVT. She was started on xarelto. Superficial wound dehiscence of her operative site for her L4-S1 TLIF. Neurosurgery consulted and recommended wound care consult.     Acute saddle pulmonary embolism with acute cor pulmonale  Deep vein thrombosis   · CT showed submassive saddle embolus (10/8),   ? Catheter directed thrombolysis (10/8)  ? Xarelto (heparin gtt stopped)  · DVT in 1997, on warfarin for 3 years  ? U/S shows left popliteal DVT (10/8)  § 2nd incidence, lifelong anticoagulation needed (xarelto)      Essential hypertension  · Home lasix 20 mg, losartan 100 mg, metoprolol succinate 25 mg  ? Held during stay for acute PE     Lumbar spondylosis  · L4-S1 transforaminal lumbar interbody fusion (Dr. Bernal, 9/17/18), chronic lower back and knee pain  ? Neurosurgery noted neurologically stable, no postop hematoma  ? PT OT  ? Superficial wound dehiscence  ? Wound care consulted and recommended santyl daily    Recent Labs   Lab  10/09/18   1643  10/09/18    2332  10/10/18   0646   WBC  4.71  5.96  4.46   HGB  10.0*  10.5*  9.8*   HCT  31.5*  33.1*  31.3*   PLT  160  163  175     Recent Labs   Lab  10/08/18   1118  10/09/18   0439  10/10/18   0646   NA  145  146*  144   K  3.5  3.6  3.6   CL  114*  119*  118*   CO2  15*  17*  17*   BUN  11  14  11   CREATININE  1.0  0.7  0.7   GLU  227*  131*  106   CALCIUM  9.9  8.8  8.6*   MG   --   2.1  1.9   PHOS   --   3.5  2.8     Recent Labs   Lab  10/08/18   1118  10/08/18   1802  10/09/18   0439  10/09/18   1455  10/10/18   0646   ALKPHOS  138*   --   131   --   120   ALT  32   --   79*   --   50*   AST  34   --   47*   --   20   ALBUMIN  3.8   --   3.3*   --   3.0*   PROT  7.7   --   6.6   --   6.3   BILITOT  0.5   --   0.4   --   0.4   INR  1.0  1.1   --   1.1   --      Recent Labs      10/08/18   1118  10/08/18   1434   TROPONINI  0.007  1.170*       Procedures:  Thrombectomy 10/8/2018    Consultants:  Consults (From admission, onward)        Status Ordering Provider     Inpatient consult to Interventional Cardiology  Once     Provider:  (Not yet assigned)    Completed ANNEL EVANS          Current Discharge Medication List      START taking these medications    Details   !! rivaroxaban (XARELTO) 15 mg Tab Take 1 tablet (15 mg) twice daily for 21 days (last dose 10/31/18) THEN start 20 mg  tablets once daily with dinner (start 11/1/18)  Qty: 42 tablet, Refills: 0      !! rivaroxaban (XARELTO) 20 mg Tab (START ON 11/1/18.) Take 1 tablet (20 mg total) by mouth daily with dinner or evening meal.  Qty: 30 tablet, Refills: 3       !! - Potential duplicate medications found. Please discuss with provider.      CONTINUE these medications which have NOT CHANGED    Details   furosemide (LASIX) 20 MG tablet Take 20 mg by mouth once daily.      losartan (COZAAR) 100 MG tablet Take 100 mg by mouth once daily.      metoprolol succinate (TOPROL-XL) 25 MG 24 hr tablet Take 1 tablet (25 mg total) by mouth 2 (two) times daily.  Qty: 60  tablet, Refills: 5    Associated Diagnoses: Essential hypertension      oxyCODONE-acetaminophen (PERCOCET) 7.5-325 mg per tablet Take 1 tablet by mouth every 6 (six) hours as needed (pain).  Qty: 60 tablet, Refills: 0      potassium chloride SA (K-DUR,KLOR-CON) 10 MEQ tablet Take 10 mEq by mouth every evening.       ranitidine (ZANTAC) 150 MG tablet Take 150 mg by mouth nightly.      tiZANidine (ZANAFLEX) 4 MG tablet Take 4 mg by mouth every evening.      topiramate (TOPAMAX) 200 MG Tab Take 1 tablet (200 mg total) by mouth 2 (two) times daily.  Qty: 180 tablet, Refills: 3             Discharge Diet:regular diet with Thin liquids    Activity: activity as tolerated    Discharge Condition: Stable    Disposition: Home-Health Care [facility]    Tests pending at the time of discharge: none      Time spent  on the discharge of the patient including review of hospital course with the patient. reviewing discharge medications and arranging follow-up care 35    Discharge examination Patient was seen and examined on the date of discharge and determined to be suitable for discharge.    Discharge plan and follow up:      Future Appointments   Date Time Provider Department Center   10/16/2018  9:00 AM Chery Lopez MD [facility] INFECT [facility]   10/31/2018  9:00 AM [facility] OI EOS [facility]   10/31/2018 10:45 AM Noel Bernal MD [facility] NEUROS[address]   1/30/2019  8:00 AM [facility] ORTHO [facility]       Provider    I personally scribed for Stacey Fuentes MD on 10/10/2018 at 2:59 PM. Electronically signed by clau Montez on 10/10/2018 at 2:59 PM

## 2018-10-10 NOTE — PLAN OF CARE
Problem: Patient Care Overview  Goal: Plan of Care Review  Outcome: Ongoing (interventions implemented as appropriate)  Patient AAOx4. Patient VSS. Patient denies pain. Patient on cardiac monitoring per MD order. Patient free from falls or injury during shift. Patient repositioned every two hours. Patient in bed, bed in lowest position, call light in reach, bed alarm set, and personal items at bedside. Will continue to monitor.

## 2018-10-10 NOTE — PROGRESS NOTES
[facility]  Cardiology  Progress Note    Patient Name: Rosa Fermin  MRN: [id number]  Admission Date: 10/8/2018  Hospital Length of Stay: 1 days  Code Status: Full Code   Attending Physician: Jigar Orta MD   Primary Care Physician: Chery Lopez MD  Expected Discharge Date: 10/12/2018  Principal Problem:Acute saddle pulmonary embolism with acute cor pulmonale    Subjective:     Hospital Course:   10/9- Patient feels much improved today. She is currently on room air and has saturations above 95%. Patient was started on Xeralto 15 mg BID. Will transfer to medical floor prior to discharge.    Interval History:No acute events overnight. Patient reports feeling well. Only complaint is of back pain.     Review of Systems   Constitution: Negative for chills, fever, weakness and malaise/fatigue.   Cardiovascular: Negative for chest pain, dyspnea on exertion, leg swelling and palpitations.   Respiratory: Negative for cough, shortness of breath and wheezing.    Musculoskeletal: Positive for back pain. Negative for arthritis, falls, joint pain and myalgias.   Gastrointestinal: Negative for abdominal pain, constipation, diarrhea, nausea and vomiting.   Neurological: Negative for dizziness, headaches and numbness.     Objective:     Vital Signs (Most Recent):  Temp: 98.7 °F (37.1 °C) (10/09/18 1500)  Pulse: 104 (10/09/18 1600)  Resp: (!) 26 (10/09/18 1600)  BP: 137/75 (10/09/18 1600)  SpO2: 100 % (10/09/18 1600) Vital Signs (24h Range):  Temp:  [98.4 °F (36.9 °C)-99.4 °F (37.4 °C)] 98.7 °F (37.1 °C)  Pulse:  [] 104  Resp:  [21-42] 26  SpO2:  [95 %-100 %] 100 %  BP: (107-171)/(60-92) 137/75     Weight: 98.9 kg (218 lb 0.6 oz)  Body mass index is 38.62 kg/m².     SpO2: 100 %  O2 Device (Oxygen Therapy): room air      Intake/Output Summary (Last 24 hours) at 10/9/2018 1931  Last data filed at 10/9/2018 1600  Gross per 24 hour   Intake 1032.32 ml   Output 998 ml   Net 34.32 ml        Lines/Drains/Airways     Drain                 Urethral Catheter 10/08/18 2100 Latex 16 Fr. less than 1 day          Peripheral Intravenous Line                 Peripheral IV - Single Lumen 10/08/18 1054 Right Antecubital 1 day         Peripheral IV - Single Lumen 10/08/18 1214 Left Antecubital 1 day                Physical Exam   Constitutional: She is oriented to person, place, and time. She appears well-developed and well-nourished. No distress.   Neck: Normal range of motion. Neck supple.   Cardiovascular: Normal rate, regular rhythm, normal heart sounds and intact distal pulses.   Pulmonary/Chest: Effort normal and breath sounds normal. No respiratory distress. She has no wheezes. She has no rales.   Abdominal: Soft. Bowel sounds are normal. There is no tenderness. There is no guarding.   Musculoskeletal: Normal range of motion. She exhibits tenderness (left leg on palpation). She exhibits no edema.   Neurological: She is alert and oriented to person, place, and time. She has normal reflexes.   Skin: Skin is warm and dry.   Psychiatric: She has a normal mood and affect. Her behavior is normal.   Vitals reviewed.      Significant Labs:   CMP   Recent Labs   Lab  10/08/18   1118  10/09/18   0439   NA  145  146*   K  3.5  3.6   CL  114*  119*   CO2  15*  17*   GLU  227*  131*   BUN  11  14   CREATININE  1.0  0.7   CALCIUM  9.9  8.8   PROT  7.7  6.6   ALBUMIN  3.8  3.3*   BILITOT  0.5  0.4   ALKPHOS  138*  131   AST  34  47*   ALT  32  79*   ANIONGAP  16  10   ESTGFRAFRICA  >60.0  >60.0   EGFRNONAA  >60.0  >60.0   , CBC   Recent Labs   Lab  10/09/18   0439  10/09/18   1455  10/09/18   1643   WBC  4.16  4.16  4.58  4.58  4.71   HGB  10.2*  10.2*  9.8*  9.8*  10.0*   HCT  31.6*  31.6*  31.4*  31.4*  31.5*   PLT  170  170  168  168  160    and INR   Recent Labs   Lab  10/08/18   1118  10/08/18   1802  10/09/18   1455   INR  1.0  1.1  1.1     APTT: 37.8  Fibrinogen: 258    Significant Imaging:   X-ray  chest 10/9:   Allowing for a slightly poorer inspiratory depth level on the current examination, no significant detrimental interval change in the appearance of the chest since 10/08/2018 is observed.      Assessment and Plan:     Brief HPI: Patient is a 63 year old female with HTN, chronic low back and knee pain, and history of DVT (in 1997) that presents to hospital with complaint of shortness of breath.    * Acute saddle pulmonary embolism with acute cor pulmonale    Patient was found to have extensive bilateral pulmonary thromboembolism with linear saddle component on chest CT. Pulmonary embolism was diagnosed as a submassive PE due to right heart strain, as well as elevated BNP (>90). She was started on a heparin drip (18 units/kg/hr, at 13.2 ml/hr).    - Went for catheter directed thrombolysis.  - Started on Xeralto 15 mg BID  - Interventional cardiology following.          Lumbar spondylosis             History of cardiac cath             Edema    - Patient has minor swelling on right leg when compared to left as well as some right leg pain.   - Ordered bilateral lower extremity venous ultrasound to investigate for DVT        History of DVT (deep vein thrombosis)- in 1997 - Provoked DVT    - DVT in left leg  - Was on coumadin for 3 years, no current anticoagulation  - Likely will require life long anticoagulation as this is second incidence of clot        HTN (hypertension)    - Patient normally on furosemide 20 mg, losartan 100 mg, and metoprolol succinate 25 mg as outpatient  - Holding antihypertensive medications in setting of acute PE            VTE Risk Mitigation (From admission, onward)        Ordered     rivaroxaban tablet 15 mg  2 times daily with meals      10/09/18 1207     IP VTE HIGH RISK PATIENT  Once      10/08/18 1457     heparin 25,000 units in dextrose 5% 250 mL (100 units/mL) infusion HIGH INTENSITY nomogram - [facility]  Continuous      10/08/18 1211     heparin 25,000 units in dextrose 5% (100  units/ml) IV bolus from bag - ADDITIONAL PRN BOLUS - 60 units/kg  As needed (PRN)      10/08/18 1211     heparin 25,000 units in dextrose 5% (100 units/ml) IV bolus from bag - ADDITIONAL PRN BOLUS - 30 units/kg  As needed (PRN)      10/08/18 1211          Lowell Reeder MD  Cardiology  [facility]

## 2018-10-10 NOTE — PT/OT/SLP EVAL
Physical Therapy Evaluation    Patient Name:  Rosa Fermin   MRN:  [id number]    Recommendations:     Discharge Recommendations:  home with home health   Discharge Equipment Recommendations: none   Barriers to discharge: None    Assessment:     Rosa Fermin is a 63 y.o. female admitted with a medical diagnosis of Acute saddle pulmonary embolism with acute cor pulmonale.  She presents with the following impairments/functional limitations:  weakness, gait instability, impaired endurance, impaired balance, impaired functional mobilty. Pt performed bed mobility CGA and transfers SBA with RW. Pt amb ~125ft SBA with RW, vc's for upright posture and AD management. Pt will benefit from skilled PT to improve deficits and increase overall functional mobility.     Rehab Prognosis:  Good; patient would benefit from acute skilled PT services to address these deficits and reach maximum level of function.      Recent Surgery: Procedure(s) (LRB):  THROMBECTOMY (Bilateral)      Plan:     During this hospitalization, patient to be seen 3 x/week to address the above listed problems via gait training, therapeutic activities, therapeutic exercises, neuromuscular re-education  · Plan of Care Expires:  11/10/18   Plan of Care Reviewed with: patient    Subjective     Communicated with RN prior to session.  Patient found supine in bed upon PT entry to room, agreeable to evaluation.      Chief Complaint: NA  Patient comments/goals: return home  Pain/Comfort:  Pain Rating 1: 5/10  Location - Side 1: Bilateral  Location - Orientation 1: generalized  Location 1: (back and B LE)  Pain Addressed 1: Reposition, Distraction  Pain Rating Post-Intervention 1: 5/10    Patients cultural, spiritual, Temple conflicts given the current situation:      Living Environment:  Pt lives with  and 9 y/o grandson in 1-story house with threshold ROCIO and tub/shower. Pt reports amb with RW since sx and mod (I) with ADLs. Pt reports prior  to sx she was working as a . Pt reports her  is able to provide assist upon d/c.   Prior to admission, patients level of function was mod (I).  Patient has the following equipment: cane, straight, walker, rolling, rollator.  DME owned (not currently used): none.  Upon discharge, patient will have assistance from .    Objective:     Patient found with: peripheral IV, telemetry     General Precautions: Standard, fall   Orthopedic Precautions:spinal precautions   Braces: LSO     Exams:  · Cognitive Exam:  Patient is oriented to Person, Place, Time and Situation  · Gross Motor Coordination:  WFL  · Postural Exam:  Patient presented with the following abnormalities:    · -       No postural abnormalities identified  · Sensation:    · -       Intact  light/touch B LE  · Skin Integrity/Edema:      · -       Skin integrity: Visible skin intact  · RLE ROM: WFL  · RLE Strength: WFL  · LLE ROM: WFL  · LLE Strength: WFL    Functional Mobility:  Bed Mobility:     · Supine to Sit: contact guard assistance    Transfers:     · Sit to Stand:  stand by assistance with rolling walker    Gait: ~125ft SBA with RW, vc's for upright posture and AD management    AM-PAC 6 CLICK MOBILITY  Total Score:18       Therapeutic Activities and Exercises:  Pt sat EOB with S, pt donned LSO.  Pt educated on:  -role of PT/POC  -safety with mobility  -importance of OOB activity  -up in chair for majority of the day  Pt safe to amb with RW with RN staff.     Patient left up in chair with all lines intact, call button in reach and RN notified.    GOALS:   Multidisciplinary Problems     Physical Therapy Goals        Problem: Physical Therapy Goal    Goal Priority Disciplines Outcome Goal Variances Interventions   Physical Therapy Goal     PT, PT/OT Ongoing (interventions implemented as appropriate)     Description:  Goals to be met by: 10/20/2018     Patient will increase functional independence with mobility by performin.  Supine to sit with Supervision  2. Sit to supine with Supervision  3. Sit to stand transfer with Supervision  4. Gait  x 200 feet with Supervision using Rolling Walker.   5. Lower extremity exercise program x15 reps per handout, with supervision                      History:     Past Medical History:   Diagnosis Date    Acute saddle pulmonary embolism with acute cor pulmonale 10/8/2018    Atypical chest pain     Bilateral chronic knee pain     Carpal tunnel syndrome on both sides     Chronic lower back pain     Deep vein thrombosis     History of DVT (deep vein thrombosis)     left leg in the 90s    Hypertension     Osteoarthritis of both knees        Past Surgical History:   Procedure Laterality Date    COLONOSCOPY  2007    Dr Bolton    COLONOSCOPY N/A 12/11/2017    Procedure: COLONOSCOPY;  Surgeon: Shanelle Braga MD;  Location: [facility];  Service: Endoscopy;  Laterality: N/A;    COLONOSCOPY N/A 12/11/2017    Performed by Shanelle Braga MD at [facility]    ENDOMETRIAL ABLATION N/A 2003    FUSION, SPINE, LUMBAR, TLIF, MINIMALLY INVASIVE @ L4-L5 & L5-S1 N/A 9/17/2018    Performed by Noel Bernal MD at [facility] OR [address] [place] [address]    MINIMALLY INVASIVE TRANSFORAMINAL LUMBAR INTERBODY FUSION (TLIF) N/A 9/17/2018    Procedure: FUSION, SPINE, LUMBAR, TLIF, MINIMALLY INVASIVE @ L4-L5 & L5-S1;  Surgeon: Noel Bernal MD;  Location: [facility] OR [address] [place] [address];  Service: Neurosurgery;  Laterality: N/A;    REPLACEMENT-KNEE-TOTAL   Left 11/19/2015    Performed by Mitch Mujica MD at [facility]    SHOULDER ARTHROSCOPY W/ ROTATOR CUFF REPAIR Bilateral 2012, 2013    TOTAL KNEE ARTHROPLASTY Left        Clinical Decision Making:     Decision Making/ Complexity Score   On examination of body system using standardized tests and measures patient presents with 1-2 elements from any of the following: body structures and functions, activity limitations, and/or participation restrictions.  Leading to a clinical  presentation that is considered stable and/or uncomplicated                              Clinical Decision Making  (Eval Complexity):  Low- [id number]     Time Tracking:     PT Received On: 10/10/18  PT Start Time: 1009     PT Stop Time: 1032  PT Total Time (min): 23 min     Billable Minutes: Evaluation 23      JOHN CAUSYE, PT  10/10/2018

## 2018-10-10 NOTE — ASSESSMENT & PLAN NOTE
63 y.o. female s/p L4-S1 TLIF 9/17/18 by Dr. Bernal, admitted for saddle PE, s/p thrombectomy on heparin gtt.    - Neurologically stable post operatively.  - No concern for post operative hematoma in the setting of heparin gtt.  - Concern for superficial wound dehiscence of surgical wounds. Wound care consult placed, appreciate recs. Per chart review, recs are daily dressing changes. Cleanse wound with normal saline or wound care spray. Skin prep around randi wound. Apply santyl cover with moistened gauze and then bordered gauze. Staples removed. No concern for infectious process at this time.   - Continue medical management per primary team  - Will continue to follow.

## 2018-10-10 NOTE — PLAN OF CARE
[facility]    HOME HEALTH ORDERS  FACE TO FACE ENCOUNTER    Patient Name: Rosa Fermin  YOB: 1955    PCP: Chery Lopez MD   PCP Address: [address] / [place] [address]  PCP Phone Number: [phone]  PCP Fax: [phone]    Encounter Date: 10/10/2018    Admit to Home Health    Diagnoses:  Active Hospital Problems    Diagnosis  POA    *Acute saddle pulmonary embolism with acute cor pulmonale [I26.02]  Yes    Acute saddle pulmonary embolism [I26.92]  Yes    Lumbar spondylosis [M47.816]  Yes    History of DVT (deep vein thrombosis)- in 1997 - Provoked DVT [Z86.718]  Not Applicable    HTN (hypertension) [I10]  Yes      Resolved Hospital Problems   No resolved problems to display.       Future Appointments   Date Time Provider Department Center   10/16/2018  9:00 AM Chery Lopez MD [facility] INFECT [facility]   10/31/2018  9:00 AM [facility] OI EOS [facility] EOS [facility]   10/31/2018 10:45 AM Noel Bernal MD [facility] NEUROS[address]   1/30/2019  8:00 AM [facility]O [facility], [facility]THO [facility]           I have seen and examined this patient face to face today. My clinical findings that support the need for the home health skilled services and home bound status are the following:  Weakness/numbness causing balance and gait disturbance due to Weakness/Debility and Surgery making it taxing to leave home.  Requiring assistive device to leave home due to unsteady gait caused by  Weakness/Debility and Surgery.    Allergies:  Review of patient's allergies indicates:   Allergen Reactions    Gabapentin Swelling    Vistaril [hydroxyzine hcl] Rash       Diet: 3 gram sodium diet    Activities: activity as tolerated    Nursing:   SN to complete comprehensive assessment including routine vital signs. Instruct on disease process and s/s of complications to report to MD. Review/verify medication list sent home with the patient at time of discharge  and instruct patient/caregiver as  needed. Frequency may be adjusted depending on start of care date.    Notify MD if SBP > 160 or < 90; DBP > 90 or < 50; HR > 120 or < 50; Temp > 101    CONSULTS:    Physical Therapy to evaluate and treat. Evaluate for home safety and equipment needs; Establish/upgrade home exercise program. Perform / instruct on therapeutic exercises, gait training, transfer training, and Range of Motion.  Occupational Therapy to evaluate and treat. Evaluate home environment for safety and equipment needs. Perform/Instruct on transfers, ADL training, ROM, and therapeutic exercises.  Aide to provide assistance with personal care, ADLs, and vital signs.    WOUND CARE ORDERS  Lumbosacral wound: Change daily. Cleanse wound with normal saline or wound care spray. Skin prep around randi wound. Apply santyl cover with moistened gauze and then bordered gauze.    Medications: Review discharge medications with patient and family and provide education.      Current Discharge Medication List      START taking these medications    Details   !! rivaroxaban (XARELTO) 15 mg Tab Take 1 tablet (15 mg) twice daily for 21 days (last dose 10/31/18) THEN start 20 mg  tablets once daily with dinner (start 11/1/18)  Qty: 42 tablet, Refills: 0      !! rivaroxaban (XARELTO) 20 mg Tab (START ON 11/1/18.) Take 1 tablet (20 mg total) by mouth daily with dinner or evening meal.  Qty: 30 tablet, Refills: 3       !! - Potential duplicate medications found. Please discuss with provider.      CONTINUE these medications which have NOT CHANGED    Details   furosemide (LASIX) 20 MG tablet Take 20 mg by mouth once daily.      losartan (COZAAR) 100 MG tablet Take 100 mg by mouth once daily.      metoprolol succinate (TOPROL-XL) 25 MG 24 hr tablet Take 1 tablet (25 mg total) by mouth 2 (two) times daily.  Qty: 60 tablet, Refills: 5    Associated Diagnoses: Essential hypertension      oxyCODONE-acetaminophen (PERCOCET) 7.5-325 mg per tablet Take 1 tablet by mouth every 6  (six) hours as needed (pain).  Qty: 60 tablet, Refills: 0      potassium chloride SA (K-DUR,KLOR-CON) 10 MEQ tablet Take 10 mEq by mouth every evening.       ranitidine (ZANTAC) 150 MG tablet Take 150 mg by mouth nightly.      tiZANidine (ZANAFLEX) 4 MG tablet Take 4 mg by mouth every evening.      topiramate (TOPAMAX) 200 MG Tab Take 1 tablet (200 mg total) by mouth 2 (two) times daily.  Qty: 180 tablet, Refills: 3             I certify that this patient is confined to her home and needs intermittent skilled nursing care, speech therapy and occupational therapy.

## 2018-10-10 NOTE — SUBJECTIVE & OBJECTIVE
Interval History:No acute events overnight. Patient reports feeling well. Only complaint is of back pain.     Review of Systems   Constitution: Negative for chills, fever, weakness and malaise/fatigue.   Cardiovascular: Negative for chest pain, dyspnea on exertion, leg swelling and palpitations.   Respiratory: Negative for cough, shortness of breath and wheezing.    Musculoskeletal: Positive for back pain. Negative for arthritis, falls, joint pain and myalgias.   Gastrointestinal: Negative for abdominal pain, constipation, diarrhea, nausea and vomiting.   Neurological: Negative for dizziness, headaches and numbness.     Objective:     Vital Signs (Most Recent):  Temp: 98.7 °F (37.1 °C) (10/09/18 1500)  Pulse: 104 (10/09/18 1600)  Resp: (!) 26 (10/09/18 1600)  BP: 137/75 (10/09/18 1600)  SpO2: 100 % (10/09/18 1600) Vital Signs (24h Range):  Temp:  [98.4 °F (36.9 °C)-99.4 °F (37.4 °C)] 98.7 °F (37.1 °C)  Pulse:  [] 104  Resp:  [21-42] 26  SpO2:  [95 %-100 %] 100 %  BP: (107-171)/(60-92) 137/75     Weight: 98.9 kg (218 lb 0.6 oz)  Body mass index is 38.62 kg/m².     SpO2: 100 %  O2 Device (Oxygen Therapy): room air      Intake/Output Summary (Last 24 hours) at 10/9/2018 1931  Last data filed at 10/9/2018 1600  Gross per 24 hour   Intake 1032.32 ml   Output 998 ml   Net 34.32 ml       Lines/Drains/Airways     Drain                 Urethral Catheter 10/08/18 2100 Latex 16 Fr. less than 1 day          Peripheral Intravenous Line                 Peripheral IV - Single Lumen 10/08/18 1054 Right Antecubital 1 day         Peripheral IV - Single Lumen 10/08/18 1214 Left Antecubital 1 day                Physical Exam   Constitutional: She is oriented to person, place, and time. She appears well-developed and well-nourished. No distress.   Neck: Normal range of motion. Neck supple.   Cardiovascular: Normal rate, regular rhythm, normal heart sounds and intact distal pulses.   Pulmonary/Chest: Effort normal and breath sounds  normal. No respiratory distress. She has no wheezes. She has no rales.   Abdominal: Soft. Bowel sounds are normal. There is no tenderness. There is no guarding.   Musculoskeletal: Normal range of motion. She exhibits tenderness (left leg on palpation). She exhibits no edema.   Neurological: She is alert and oriented to person, place, and time. She has normal reflexes.   Skin: Skin is warm and dry.   Psychiatric: She has a normal mood and affect. Her behavior is normal.   Vitals reviewed.      Significant Labs:   CMP   Recent Labs   Lab  10/08/18   1118  10/09/18   0439   NA  145  146*   K  3.5  3.6   CL  114*  119*   CO2  15*  17*   GLU  227*  131*   BUN  11  14   CREATININE  1.0  0.7   CALCIUM  9.9  8.8   PROT  7.7  6.6   ALBUMIN  3.8  3.3*   BILITOT  0.5  0.4   ALKPHOS  138*  131   AST  34  47*   ALT  32  79*   ANIONGAP  16  10   ESTGFRAFRICA  >60.0  >60.0   EGFRNONAA  >60.0  >60.0   , CBC   Recent Labs   Lab  10/09/18   0439  10/09/18   1455  10/09/18   1643   WBC  4.16  4.16  4.58  4.58  4.71   HGB  10.2*  10.2*  9.8*  9.8*  10.0*   HCT  31.6*  31.6*  31.4*  31.4*  31.5*   PLT  170  170  168  168  160    and INR   Recent Labs   Lab  10/08/18   1118  10/08/18   1802  10/09/18   1455   INR  1.0  1.1  1.1     APTT: 37.8  Fibrinogen: 258    Significant Imaging:   X-ray chest 10/9:   Allowing for a slightly poorer inspiratory depth level on the current examination, no significant detrimental interval change in the appearance of the chest since 10/08/2018 is observed.

## 2018-10-10 NOTE — DISCHARGE INSTRUCTIONS
Lumbosacral wound: Change daily. Cleanse wound with normal saline or wound care spray. Skin prep around randi wound. Apply santyl cover with moistened gauze and then bordered gauze.

## 2018-10-10 NOTE — PLAN OF CARE
Problem: Patient Care Overview  Goal: Individualization & Mutuality  Outcome: Outcome(s) achieved Date Met: 10/10/18  Pt being discharged home per MD orders.  VS stable, pt afebrile and no c/o pain at this time.  Reviewed discharge instructions, follow-up's and meds with pt, DEMETRI. Pt provided with wound care dressing supplies for 2 days.  HH orders and instructions provided.  Removed PIVs from L and R AC, dry gauze/tape placed to sites, CDI.  Ride to be provided by family upon discharge and will transport per W/C.

## 2018-10-10 NOTE — MEDICAL/APP STUDENT
Hospital Medicine  Progress note    Team: [facility] HOSP MED D Stacey Fuentes MD  Admit Date: 10/8/2018  LORI 10/12/2018  Code status: Full Code    Principal Problem:  Acute saddle pulmonary embolism with acute cor pulmonale    Interval hx:      ROS   Respiratory: no cough or shortness of breath  Cardiovascular: no chest pain or palpitations  Gastrointestinal: no nausea or vomiting, no abdominal pain or change in bowel habits  Behavioral/Psych: no depression or anxiety  Musculoskeletal: Positive for back pain.    PEx  Temp:  [98.7 °F (37.1 °C)-99.7 °F (37.6 °C)]   Pulse:  []   Resp:  [20-28]   BP: (107-165)/(64-90)   SpO2:  [96 %-100 %]     Intake/Output Summary (Last 24 hours) at 10/10/2018 0926  Last data filed at 10/9/2018 1600  Gross per 24 hour   Intake 135.8 ml   Output 175 ml   Net -39.2 ml       General Appearance: no acute distress   Heart: regular rate and rhythm  Respiratory: Normal respiratory effort, no crackles   Abdomen: Soft, non-tender; bowel sounds active  Skin: intact. IV sites ok  Neurologic:  No focal numbness or weakness  Mental status: Alert, oriented x 4, affect appropriate   She exhibits tenderness (left leg on palpation).     Recent Labs   Lab  10/09/18   1643  10/09/18   2332  10/10/18   0646   WBC  4.71  5.96  4.46   HGB  10.0*  10.5*  9.8*   HCT  31.5*  33.1*  31.3*   PLT  160  163  175     Recent Labs   Lab  10/08/18   1118  10/09/18   0439  10/10/18   0646   NA  145  146*  144   K  3.5  3.6  3.6   CL  114*  119*  118*   CO2  15*  17*  17*   BUN  11  14  11   CREATININE  1.0  0.7  0.7   GLU  227*  131*  106   CALCIUM  9.9  8.8  8.6*   MG   --   2.1  1.9   PHOS   --   3.5  2.8     Recent Labs   Lab  10/08/18   1118  10/08/18   1802  10/09/18   0439  10/09/18   1455  10/10/18   0646   ALKPHOS  138*   --   131   --   120   ALT  32   --   79*   --   50*   AST  34   --   47*   --   20   ALBUMIN  3.8   --   3.3*   --   3.0*   PROT  7.7   --   6.6   --   6.3   BILITOT  0.5   --   0.4   --   0.4    INR  1.0  1.1   --   1.1   --       No results for input(s): POCTGLUCOSE in the last 168 hours.  Recent Labs      10/08/18   1118  10/08/18   1434   TROPONINI  0.007  1.170*       Scheduled Meds:   rivaroxaban  15 mg Oral BID WM    senna-docusate 8.6-50 mg  1 tablet Oral BID     Continuous Infusions:  As Needed:  acetaminophen, dextrose 50%, dextrose 50%, glucagon (human recombinant), glucose, glucose, ondansetron, oxyCODONE, sodium chloride 0.9%    Active Hospital Problems    Diagnosis  POA    *Acute saddle pulmonary embolism with acute cor pulmonale [I26.02]  Yes    Acute saddle pulmonary embolism [I26.92]  Yes    Lumbar spondylosis [M47.816]  Yes    History of DVT (deep vein thrombosis)- in 1997 - Provoked DVT [Z86.718]  Not Applicable    HTN (hypertension) [I10]  Yes      Resolved Hospital Problems   No resolved problems to display.       Overview      Assessment and Plan for Problems addressed today:        Acute saddle pulmonary embolism with acute cor pulmonale  Deep vein thrombosis   · CT showed submassive saddle embolus (10/8),   · Catheter directed thrombolysis (10/8)  · Xarelto (heparin gtt stopped)  · DVT in 1997, on warfarin for 3 years, no home anticoagulation  · U/S shows left DVT (10/8)  · 2nd incidence, lifelong anticoagulation needed    History of cardiac cath     Essential hypertension  · Home lasix 20 mg, losartan 100 mg, metoprolol succinate 25 mg  · Held for acute PE    Lumbar spondylosis  · L4-S1 transforaminal lumbar interbody fusion (Dr. Bernal, 9/17/18), chronic lower back and knee pain  · Neurosurgery noted neurologically stable, no postop hematoma  · PT OT  · Superficial wound dehiscence          Diet:  Diet [facility]; Low Sodium,2gm  DVT PPx:   VTE Risk Mitigation (From admission, onward)        Ordered     rivaroxaban tablet 15 mg  2 times daily with meals      10/09/18 1207     IP VTE HIGH RISK PATIENT  Once      10/08/18 1457     heparin 25,000 units in  dextrose 5% 250 mL (100 units/mL) infusion HIGH INTENSITY nomogram - [facility]  Continuous      10/08/18 1211     heparin 25,000 units in dextrose 5% (100 units/ml) IV bolus from bag - ADDITIONAL PRN BOLUS - 60 units/kg  As needed (PRN)      10/08/18 1211     heparin 25,000 units in dextrose 5% (100 units/ml) IV bolus from bag - ADDITIONAL PRN BOLUS - 30 units/kg  As needed (PRN)      10/08/18 1211        Goals of Care:     Lines/ Drains/ Airways:  Peripheral IV***    Wounds:    Discharge plan and follow up    Provider    I personally scribed for Stacey Fuentes MD on 10/10/2018 at 9:26 AM. Electronically signed by clau Montez on 10/10/2018 at 9:26 AM

## 2018-10-10 NOTE — SUBJECTIVE & OBJECTIVE
Interval History: Reports right knee pain. Denies back pain, numbness/tingling, weakness, fever/chills. Tolerating diet. S/P thrombectomy 10/8.    Medications:  Continuous Infusions:  Scheduled Meds:   rivaroxaban  15 mg Oral BID WM    senna-docusate 8.6-50 mg  1 tablet Oral BID     PRN Meds:acetaminophen, dextrose 50%, dextrose 50%, glucagon (human recombinant), glucose, glucose, influenza, ondansetron, oxyCODONE, sodium chloride 0.9%     Review of Systems  Objective:     Weight: 98.9 kg (218 lb 0.6 oz)  Body mass index is 38.62 kg/m².  Vital Signs (Most Recent):  Temp: 99.5 °F (37.5 °C) (10/10/18 0750)  Pulse: 105 (10/10/18 1507)  Resp: 20 (10/10/18 1316)  BP: (!) 159/86 (10/10/18 1316)  SpO2: 98 % (10/10/18 1316) Vital Signs (24h Range):  Temp:  [99.5 °F (37.5 °C)-99.7 °F (37.6 °C)] 99.5 °F (37.5 °C)  Pulse:  [100-105] 105  Resp:  [20-24] 20  SpO2:  [96 %-98 %] 98 %  BP: (115-165)/(66-90) 159/86                           Neurosurgery Physical Exam  General: well developed, well nourished, no distress.   Head: normocephalic, atraumatic  Neurologic: Alert and oriented. Thought content appropriate.  GCS: Motor: 6/Verbal: 5/Eyes: 4 GCS Total: 15  Mental Status: Awake, Alert, Oriented x 4  Language: No aphasia  Speech: No dysarthria  Cranial nerves: face symmetric, tongue midline, CN II-XII grossly intact.   Eyes: pupils equal, round, reactive to light with accomodation, EOMI.   Pulmonary: normal respirations, no signs of respiratory distress  Abdomen: soft, non-distended, not tender to palpation  Sensory: intact to light touch throughout    Motor Strength:Moves all extremities spontaneously with good tone. No abnormal movements seen.     Strength  Deltoids Triceps Biceps Wrist Extension Wrist Flexion Hand    Upper: R 5/5 5/5 5/5 5/5 5/5 5/5    L 5/5 5/5 5/5 5/5 5/5 5/5     Iliopsoas Quadriceps Knee  Flexion Tibialis  anterior Gastro- cnemius EHL   Lower: R 4+/5 5/5 5/5 5/5 5/5 5/5    L 4+/5 5/5 5/5 5/5 5/5 5/5      Hallman: absent  Clonus: absent  Babinski: absent  Skin: Skin is warm, dry and intact.  All incisions with superficial dehiscence and granulation tissue. No erythema, drainage to indicate infectious process. Inferior right incision with 3 staples in place.          Significant Labs:  Recent Labs   Lab  10/09/18   0439  10/10/18   0646   GLU  131*  106   NA  146*  144   K  3.6  3.6   CL  119*  118*   CO2  17*  17*   BUN  14  11   CREATININE  0.7  0.7   CALCIUM  8.8  8.6*   MG  2.1  1.9     Recent Labs   Lab  10/09/18   1643  10/09/18   2332  10/10/18   0646   WBC  4.71  5.96  4.46   HGB  10.0*  10.5*  9.8*   HCT  31.5*  33.1*  31.3*   PLT  160  163  175     Recent Labs   Lab  10/08/18   1802  10/09/18   0439  10/09/18   1455  10/10/18   0646   INR  1.1   --   1.1   --    APTT  75.4*  22.9  22.9  37.8*  24.8     Microbiology Results (last 7 days)     ** No results found for the last 168 hours. **        Recent Lab Results       10/10/18  1117 10/10/18  0646 10/09/18  2332 10/09/18  1643      Immature Granulocytes  0.2 0.2 0.0     Immature Grans (Abs)  0.01  Comment:  Mild elevation in immature granulocytes is non specific and   can be seen in a variety of conditions including stress response,   acute inflammation, trauma and pregnancy. Correlation with other   laboratory and clinical findings is essential.   0.01  Comment:  Mild elevation in immature granulocytes is non specific and   can be seen in a variety of conditions including stress response,   acute inflammation, trauma and pregnancy. Correlation with other   laboratory and clinical findings is essential.   0.00  Comment:  Mild elevation in immature granulocytes is non specific and   can be seen in a variety of conditions including stress response,   acute inflammation, trauma and pregnancy. Correlation with other   laboratory and clinical findings is essential.       Albumin  3.0       Alkaline Phosphatase  120       ALT  50       Anion Gap  9       aPTT   24.8  Comment:  aPTT therapeutic range = 39-69 seconds       AST  20       Baso #  0.02 0.02 0.01     Basophil%  0.4 0.3 0.2     Total Bilirubin  0.4  Comment:  For infants and newborns, interpretation of results should be based  on gestational age, weight and in agreement with clinical  observations.  Premature Infant recommended reference ranges:  Up to 24 hours.............<8.0 mg/dL  Up to 48 hours............<12.0 mg/dL  3-5 days..................<15.0 mg/dL  6-29 days.................<15.0 mg/dL         BUN, Bld  11       Calcium  8.6       Chloride  118       CO2  17       Creatinine  0.7       Differential Method  Automated Automated Automated     eGFR if   >60.0       eGFR if non   >60.0  Comment:  Calculation used to obtain the estimated glomerular filtration  rate (eGFR) is the CKD-EPI equation.          Eos #  0.2 0.2 0.2     Eosinophil%  4.9 3.7 3.4     Fibrinogen 384 355 320 313     Glucose  106       Gran # (ANC)  2.4 3.3 2.4     Gran%  53.7 54.7 50.4     Hematocrit  31.3 33.1 31.5     Hemoglobin  9.8 10.5 10.0     Lymph #  1.4 1.9 1.7     Lymph%  30.9 32.4 36.9     Magnesium  1.9       MCH  27.9 28.0 28.0     MCHC  31.3 31.7 31.7     MCV  89 88 88     Mono #  0.4 0.5 0.4     Mono%  9.9 8.7 9.1     MPV  8.8 8.7 8.7     nRBC  0 0 0     Phosphorus  2.8       Platelets  175 163 160     Potassium  3.6       Total Protein  6.3       RBC  3.51 3.75 3.57     RDW  17.1 17.1 17.2     Sodium  144       WBC  4.46 5.96 4.71         All pertinent labs from the last 24 hours have been reviewed.    Significant Diagnostics:  No new imaging

## 2018-10-10 NOTE — PHYSICIAN QUERY
PT Name: Rosa Fermin  MR #: [id number]     Physician Query Form - Documentation Clarification      CDS/: Karolina SIMMS     Contact information: [email or web address]    This form is a permanent document in the medical record.     Query Date: October 10, 2018    By submitting this query, we are merely seeking further clarification of documentation. Please utilize your independent clinical judgment when addressing the question(s) below.    The Medical record reflects the following:    Supporting Clinical Findings Location in Medical Record   While walking, she suddenly felt very short of breath. She denies any chest pain, syncope, or palpitations during this time. She asked her daughter who was with her to get her a wheel chair so she could be brought into hospital. Even while seated she remained short of breath. She arrived to the emergency department where she underwent a CT scan that showed extensive bilateral pulmonary thromboembolism with linear saddle component. Her troponin was negative and BNP was 115. TTE at bedside showed evidence of RV strain, Orozco's sign, mid and apical RV hypokinesis with mildly depressed RV function, PASP 40, hyperdynamic LVEF (>73%). She was started on a heparin drip, and cardiology was called.    --------Past medical history  Acute saddle pulmonary embolism with acute cor pulmonal 10/8/2018      Acute saddle pulmonary embolism without acute cor pulmonale     --------Patient was found to have extensive bilateral pulmonary thromboembolism with linear saddle component on chest CT. Pulmonary embolism was diagnosed as a submassive PE due to right heart strain, as well as elevated BNP (>90). She was started on a heparin drip (18 units/kg/hr, at 13.2 ml/hr).     --------- Will be taken for catheter directed thrombolysis. Neurosurgery informed due to patients recent surgery. Neurosurgery gave approval for the procedure.  - Started on alteplase 1 mg/hr   - CBC and  "fibrinogen q6h       *If fibrinogen is >150 and <180 reduce rate by half       *If fibrinogen is <150 hold tPA drip until next check    - Interventional cardiology following.     H&P   Acute saddle pulmonary embolism    63 y.o. female with PMH of bilateral chronic knee pain, DVT in 1990s on coumadin in the past, HTN, chronic back pain s/p lumbar fusion on 9/17/18 by neurosx who presents to the ED with c/o acute SOB. CTA chest revealed saddle PE. Submassive PE based on improvement in BP, mild biochemical marker elevation (BNP), and RV strain. After discussing with interventional cardiology the plan is to take for catheter directed thrombolysis. Spoke with neurosurgery on call who said ok to proceed as benefit outweighs the risk.   Interventional cardiology 10/8 150 pm                                                                            Doctor, Please specify diagnosis or diagnoses associated with above clinical findings.        Doctor, please clarify conflicting documentation of "Acute saddle pulmonary embolism and cor pulmonale status."    Provider Use Only      [x  ] Acute saddle pulmonary embolism  with cor pulmonale.      [  ] Acute saddle pulmonary embolism  without cor pulmonale.    [  ] Other, please specify_______________________________.                                                                                                               [  ] Clinically undetermined            "

## 2018-10-10 NOTE — PT/OT/SLP EVAL
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  Equipment Used at Home:  cane, straight, walker, rolling, rollator  Assistance upon Discharge: family able to assist     Past Medical History:   Diagnosis Date    Acute saddle pulmonary embolism with acute cor pulmonale 10/8/2018    Atypical chest pain     Bilateral chronic knee pain     Carpal tunnel syndrome on both sides     Chronic lower back pain     Deep vein thrombosis     History of DVT (deep vein thrombosis)     left leg in the 90s    Hypertension     Osteoarthritis of both knees        Past Surgical History:   Procedure Laterality Date    COLONOSCOPY  2007    Dr Bolton    COLONOSCOPY N/A 12/11/2017    Procedure: COLONOSCOPY;  Surgeon: Shanelle Braga MD;  Location: [facility];  Service: Endoscopy;  Laterality: N/A;    COLONOSCOPY N/A 12/11/2017    Performed by Shanelle Braga MD at [facility]    ENDOMETRIAL ABLATION N/A 2003    FUSION, SPINE, LUMBAR, TLIF, MINIMALLY INVASIVE @ L4-L5 & L5-S1 N/A 9/17/2018    Performed by Noel Bernal MD at [facility] OR [address] [place] [address]    MINIMALLY INVASIVE TRANSFORAMINAL LUMBAR INTERBODY FUSION (TLIF) N/A 9/17/2018    Procedure: FUSION, SPINE, LUMBAR, TLIF, MINIMALLY INVASIVE @ L4-L5 & L5-S1;  Surgeon: Noel Bernal MD;  Location: [facility] OR [address] [place] [address];  Service: Neurosurgery;  Laterality: N/A;    REPLACEMENT-KNEE-TOTAL   Left 11/19/2015    Performed by  Mitch Mujica MD at [facility] OR    SHOULDER ARTHROSCOPY W/ ROTATOR CUFF REPAIR Bilateral 2012, 2013    TOTAL KNEE ARTHROPLASTY Left        Subjective     Chief Complaint: B knee pain   Patient/Family Comments/goals: return to home and work     Pain/Comfort:  · Location - Side 1: Bilateral  · Location 1: leg    Patients cultural, spiritual, Taoism conflicts given the current situation: none stated     Objective:     Communicated with: RN prior to session.  Patient found all lines intact and peripheral IV, telemetry upon OT entry to room.    General Precautions: Standard, fall   Orthopedic Precautions:spinal precautions   Braces: LSO     Occupational Performance:    Bed Mobility:    · Pt with SBA for bed mobility with verbal cues for spinal precautions and  Sheet management     Functional Mobility/Transfers:  · Functional Mobility: Pt able to demonstrate SBA and verbal cues for in room, bathroom and hallway mobility with rolling walker and brace in place     Activities of Daily Living:  · Pt abl eto stand at sink with SBA for oral care and hygiene  · Upper extremity dressing with CGA     Cognitive/Visual Perceptual:  Cognitive/Psychosocial Skills:     -       Oriented to: Person, Place, Time and Situation   -       Follows Commands/attention:Follows two-step commands  -       Communication: clear/fluent  -       Memory: No Deficits noted  -       Safety awareness/insight to disability: intact   -       Mood/Affect/Coping skills/emotional control: Appropriate to situation    Physical Exam:  Upper Extremity Range of Motion:     -       Right Upper Extremity: WFL    -       Left Upper Extremity: WFL      Upper Extremity Strength:    -       Right Upper Extremity: WFL  -       Left Upper Extremity: WFL      AMPAC 6 Click ADL:  AMPAC Total Score: 17    Treatment & Education:  Evaluation complete and goals set.  Pt educated on safety, role of OT, importance of increased participation in self care for gains ,  "expectations for participation, expectations for gains, POC, energy conservation, caregiver strain. White board updated.   - grooming and oral care standing at sink with SBA and verbal cues for walker management and encouragement   Education:    Patient left up in chair with all lines intact    Assessment:     Rosa Fermin is a 63 y.o. female with a medical diagnosis of Acute saddle pulmonary embolism with acute cor pulmonale. Pt overall SBA/CGA for self care completion Pt with good participation and tolerance of session with return to  for max functional gains.  She presents with the following performance deficits affecting function: impaired endurance, impaired self care skills, impaired functional mobilty, pain.      Rehab Prognosis: Good; patient would benefit from acute skilled OT services to address these deficits and reach maximum level of function.         Clinical Decision Makin.  OT Low:  "Pt evaluation falls under low complexity for evaluation coding due to performance deficits noted in 1-3 areas as stated above and 0 co-morbities affecting current functional status. Data obtained from problem focused assessments. No modifications or assistance was required for completion of evaluation. Only brief occupational profile and history review completed."     Plan:     Patient to be seen 3 x/week to address the above listed problems via self-care/home management, therapeutic activities, therapeutic exercises  · Plan of Care Expires: 11/10/18  · Plan of Care Reviewed with: patient    This Plan of care has been discussed with the patient who was involved in its development and understands and is in agreement with the identified goals and treatment plan    GOALS:   Multidisciplinary Problems     Occupational Therapy Goals        Problem: Occupational Therapy Goal    Goal Priority Disciplines Outcome Interventions   Occupational Therapy Goal     OT, PT/OT Ongoing (interventions implemented as " appropriate)    Description:  Goals to be met by: 10/25    Patient will increase functional independence with ADLs by performing:    UE Dressing with [place].  LE Dressing with Set-up Assistance.  Grooming while standing with [place].  Toileting from toilet with Stand-by Assistance for hygiene and clothing management.   Bathing from  edge of bed with Set-up Assistance.                      Time Tracking:     OT Date of Treatment: 10/10/18  OT Start Time: 1010  OT Stop Time: 1030  OT Total Time (min): 20 min    Billable Minutes:Evaluation 10  Self Care/Home Management 10    Alka Pathak, OT  10/10/2018

## 2018-10-12 ENCOUNTER — PATIENT OUTREACH (OUTPATIENT)
Dept: ADMINISTRATIVE | Facility: CLINIC | Age: 63
End: 2018-10-12

## 2018-10-12 NOTE — PATIENT INSTRUCTIONS
Pulmonary Embolism (PE)  A pulmonary embolus is most often due to a blood clot that develops in a deep vein of the leg (deep vein thrombosis). If that clot, breaks loose and travels to the lung, it is called a pulmonary embolism (PE). This can cut off the flow of blood in the lungs.  A blood clot in the lungs is a medical emergency and may cause death.   Healthcare providers use the term venous thromboembolism (VTE) to describe these 2 conditions: deep vein thrombosis and pulmonary embolism. They use the term VTE because the 2 conditions are very closely related. And, because their prevention and treatment are also closely related.      A pulmonary embolism occurs when a blood clot forms in a vein and travels to the lungs.   How is pulmonary embolism diagnosed?  Your healthcare provider examines you and asks about your symptoms and health history. You may also have one or more of the following:  · Blood tests to check for blood clotting or other problems  · Imaging tests to look for clots in the veins or lung  · Electrocardiography (ECG) to test how well the heart is working  How is pulmonary embolism treated?  · Blood-thinning medicines (anticoagulants). These medicines thin the blood. They may be given as a pill, as an injection, or through a tube into a vein (intravenous or IV). Blood thinners help prevent more blood clots from forming. They also help to prevent an existing clot from getting larger.  · Thrombolysis. Thrombolytic medicines are used to quickly dissolve a blood clot. A long, narrow tube (catheter) is used to deliver medicine directly to the clot. Thrombolytic medicines increase the risk of bleeding so they are used very carefully.  · Inferior vena cava (IVC) filter surgery. The vena cava is the bodys largest vein. It carries blood from the body to the heart. A small filter traps blood clots in the lower body and prevents them from traveling to the lungs. The filter is inserted into the vein  through a catheter. The filter may be used if blood thinners cannot be taken or if they don't work.   · Pulmonary embolectomy. This is a procedure to remove a blood clot in the lungs. It may be done with surgery or with a catheter inserted in the body. It may be done when other treatments aren't safe or don't work.  What are the long-term concerns?  With treatment, blood clots are usually dissolved or removed. Some treatments can even help prevent future clots. But having a PE can put you at risk for another life-threatening blood clot. So, you will likely need to take anticoagulants to help keep blood clots from forming again. You may need to take this medicine for months or years.  You may also need to make lifestyle changes. This may include getting more active and eating healthier. You may need to wear elastic (compression) stockings and and take breaks on long trips.  Call 911  Call 911 or get emergency help if you have symptoms of a blood clot that has traveled to the lungs. The symptoms include:  · Chest pain  · Trouble breathing  · Coughing (may cough up blood)  · Fainting  · Fast heartbeat  · Sweating  Call 911 if you have heavy or uncontrolled bleeding.  When to call your healthcare provider  Call your healthcare provider if you have swelling or pain in your leg, arm, or other area. These are symptoms of a blood clot.  You may have bleeding if you take medicine to help prevent blood clots.  Call your healthcare provider if you have signs or symptoms of bleeding. This includes:  · Blood in the urine  · Bleeding with bowel movements  · Bleeding from the nose, gums, a cut, or vagina   Date Last Reviewed: 2/1/2017 © 2000-2018 [employer]. [address] [place] [address], [place] [address]. All rights reserved. This information is not intended as a substitute for professional medical care. Always follow your healthcare professional's instructions.

## 2018-10-16 PROBLEM — I26.92 CHRONIC SADDLE PULMONARY EMBOLISM WITHOUT ACUTE COR PULMONALE: Status: ACTIVE | Noted: 2018-10-16

## 2018-10-16 PROBLEM — R60.0 EDEMA, PERIPHERAL: Status: ACTIVE | Noted: 2018-10-16

## 2018-10-16 PROBLEM — G62.89 AXONAL NEUROPATHY: Status: ACTIVE | Noted: 2018-10-16

## 2018-10-16 PROBLEM — I27.82 CHRONIC SADDLE PULMONARY EMBOLISM WITHOUT ACUTE COR PULMONALE: Status: ACTIVE | Noted: 2018-10-16

## 2018-10-19 NOTE — DISCHARGE SUMMARY
Discharge Summary  Hospital Medicine    Attending Provider on Discharge: Stacey Fuentes MD  [facility] Medicine Team: [facility]OSP MED D  Date of Admission:  10/8/2018     Date of Discharge:  10/10/2018  Code status: Full Code    Active Hospital Problems    Diagnosis  POA    *Acute saddle pulmonary embolism with acute cor pulmonale [I26.02]  Yes    Acute saddle pulmonary embolism [I26.92]  Yes    Lumbar spondylosis [M47.816]  Yes    History of DVT (deep vein thrombosis)- in 1997 - Provoked DVT [Z86.718]  Not Applicable    HTN (hypertension) [I10]  Yes      Resolved Hospital Problems   No resolved problems to display.     HPI  Patient is a 63 year old female with HTN, chronic low back and knee pain, and history of DVT (in 1997) that presents to hospital with complaint of shortness of breath. She reports that her problems started she was walking through the [facility] parking lot to come see Dr. Bernal. She had a laminectomy, diskectomy, and spinal fusion on 9/17/2018 and she was coming in on 10/8 for a follow up appointment. While walking, she suddenly felt very short of breath. She denies any chest pain, syncope, or palpitations during this time. She asked her daughter who was with her to get her a wheel chair so she could be brought into hospital. Even while seated she remained short of breath. She arrived to the emergency department where she underwent a CT scan that showed extensive bilateral pulmonary thromboembolism with linear saddle component. Her troponin was negative and BNP was 115. TTE at bedside showed evidence of RV strain, Orozco's sign, mid and apical RV hypokinesis with mildly depressed RV function, PASP 40, hyperdynamic LVEF (>73%). She was started on a heparin drip, and cardiology was called.     Of note, patient has a history of DVT in her left leg diagnosed in 1997. The clot happened after she had been immobile for a period of time after an ankle sprain. She was placed on coumadin for three years,  and was eventually taken off of it; she is currently on no anticoagulation. Patient reports feeling less mobile since her surgery on 9/17, but reports that she still ambulates around her home. She denies any history of travel, smoking history, diabetes, or hyperlipidemia. At time of the exam, patient only complained of shortness of breath which was improved on her oxygen as well as some right leg pain and swelling. She is not aware of any history of clotting problems in her family or any autoimmune diseases.      Spoke to patient about her code status. She informed us that she would like to be full code.     Hospital Course  Patient was found to have RV strain after chest CT scan showed extensive bilateral pulmonary thromboembolism with linea saddle component. She was admitted to cardiac critical care unit, placed on a heparin gtt and underwent catheter directed thrombolysis by interventional cardiology on 10/8/2018. She was also found to have left popliteal DVT. She was started on xarelto. Superficial wound dehiscence of her operative site for her L4-S1 TLIF. Neurosurgery consulted and recommended wound care consult.     Acute saddle pulmonary embolism with acute cor pulmonale  Deep vein thrombosis   · CT showed submassive saddle embolus (10/8),   ? Catheter directed thrombolysis (10/8)  ? Xarelto (heparin gtt stopped)  · DVT in 1997, on warfarin for 3 years  ? U/S shows left popliteal DVT (10/8)  § 2nd incidence, lifelong anticoagulation needed (xarelto)      Essential hypertension  · Home lasix 20 mg, losartan 100 mg, metoprolol succinate 25 mg  ? Held during stay for acute PE     Lumbar spondylosis  · L4-S1 transforaminal lumbar interbody fusion (Dr. Bernal, 9/17/18), chronic lower back and knee pain  ? Neurosurgery noted neurologically stable, no postop hematoma  ? PT OT  ? Superficial wound dehiscence  ? Wound care consulted and recommended santyl daily    Recent Labs   Lab  10/09/18   1643  10/09/18    2332  10/10/18   0646   WBC  4.71  5.96  4.46   HGB  10.0*  10.5*  9.8*   HCT  31.5*  33.1*  31.3*   PLT  160  163  175     Recent Labs   Lab  10/08/18   1118  10/09/18   0439  10/10/18   0646   NA  145  146*  144   K  3.5  3.6  3.6   CL  114*  119*  118*   CO2  15*  17*  17*   BUN  11  14  11   CREATININE  1.0  0.7  0.7   GLU  227*  131*  106   CALCIUM  9.9  8.8  8.6*   MG   --   2.1  1.9   PHOS   --   3.5  2.8     Recent Labs   Lab  10/08/18   1118  10/08/18   1802  10/09/18   0439  10/09/18   1455  10/10/18   0646   ALKPHOS  138*   --   131   --   120   ALT  32   --   79*   --   50*   AST  34   --   47*   --   20   ALBUMIN  3.8   --   3.3*   --   3.0*   PROT  7.7   --   6.6   --   6.3   BILITOT  0.5   --   0.4   --   0.4   INR  1.0  1.1   --   1.1   --      Recent Labs      10/08/18   1118  10/08/18   1434   TROPONINI  0.007  1.170*       Procedures:  Thrombectomy 10/8/2018    Consultants:  Consults (From admission, onward)        Status Ordering Provider     Inpatient consult to Interventional Cardiology  Once     Provider:  (Not yet assigned)    Completed ANNEL EVANS          Current Discharge Medication List      START taking these medications    Details   !! rivaroxaban (XARELTO) 15 mg Tab Take 1 tablet (15 mg) twice daily for 21 days (last dose 10/31/18) THEN start 20 mg  tablets once daily with dinner (start 11/1/18)  Qty: 42 tablet, Refills: 0      !! rivaroxaban (XARELTO) 20 mg Tab (START ON 11/1/18.) Take 1 tablet (20 mg total) by mouth daily with dinner or evening meal.  Qty: 30 tablet, Refills: 3       !! - Potential duplicate medications found. Please discuss with provider.      CONTINUE these medications which have NOT CHANGED    Details   furosemide (LASIX) 20 MG tablet Take 20 mg by mouth once daily.      losartan (COZAAR) 100 MG tablet Take 100 mg by mouth once daily.      metoprolol succinate (TOPROL-XL) 25 MG 24 hr tablet Take 1 tablet (25 mg total) by mouth 2 (two) times daily.  Qty: 60  tablet, Refills: 5    Associated Diagnoses: Essential hypertension      oxyCODONE-acetaminophen (PERCOCET) 7.5-325 mg per tablet Take 1 tablet by mouth every 6 (six) hours as needed (pain).  Qty: 60 tablet, Refills: 0      potassium chloride SA (K-DUR,KLOR-CON) 10 MEQ tablet Take 10 mEq by mouth every evening.       ranitidine (ZANTAC) 150 MG tablet Take 150 mg by mouth nightly.      tiZANidine (ZANAFLEX) 4 MG tablet Take 4 mg by mouth every evening.      topiramate (TOPAMAX) 200 MG Tab Take 1 tablet (200 mg total) by mouth 2 (two) times daily.  Qty: 180 tablet, Refills: 3             Discharge Diet:regular diet     Activity: activity as tolerated    Discharge Condition: Stable    Disposition: Home-Health Care Svc    Tests pending at the time of discharge: none      Time spent  on the discharge of the patient including review of hospital course with the patient. reviewing discharge medications and arranging follow-up care 35 min    Discharge examination Patient was seen and examined on the date of discharge and determined to be suitable for discharge.    Discharge plan and follow up:      Future Appointments   Date Time Provider Department Center   10/16/2018  9:00 AM Chery Lopez MD [facility] INFECT [facility]   10/31/2018  9:00 AM [facility] OI EOS [facility] EOS [facility]   10/31/2018 10:45 AM Noel Bernal MD [facility] NEUROS[address]   1/30/2019  8:00 AM ORTHO CLINIC, B [facility] ORTHO [facility]       Provider    I personally scribed for Stacey Fuentes MD on 10/10/2018 at 2:59 PM. Electronically signed by clau Montez on 10/10/2018 at 2:59 PM

## 2018-10-29 ENCOUNTER — TELEPHONE (OUTPATIENT)
Dept: NEUROSURGERY | Facility: CLINIC | Age: 63
End: 2018-10-29

## 2018-10-29 NOTE — TELEPHONE ENCOUNTER
Unable to lv msg.    ----- Message from Benja George sent at 10/29/2018 11:35 AM CDT -----  Needs Advice    Reason for call: Pt is calling to confirm if the doctor can complete FMLA paperwork when she comes in for appt on 10/31/18.         Communication Preference: [phone]    Additional Information:

## 2018-10-31 ENCOUNTER — HOSPITAL ENCOUNTER (OUTPATIENT)
Dept: RADIOLOGY | Facility: HOSPITAL | Age: 63
Discharge: HOME | End: 2018-10-31
Attending: NEUROLOGICAL SURGERY
Payer: COMMERCIAL

## 2018-10-31 ENCOUNTER — OFFICE VISIT (OUTPATIENT)
Dept: NEUROSURGERY | Facility: CLINIC | Age: 63
End: 2018-10-31
Payer: COMMERCIAL

## 2018-10-31 VITALS
SYSTOLIC BLOOD PRESSURE: 124 MMHG | DIASTOLIC BLOOD PRESSURE: 56 MMHG | BODY MASS INDEX: 39.15 KG/M2 | TEMPERATURE: 99 F | WEIGHT: 221 LBS | HEART RATE: 88 BPM

## 2018-10-31 DIAGNOSIS — Z98.1 HISTORY OF LUMBAR FUSION: ICD-10-CM

## 2018-10-31 DIAGNOSIS — M43.17 SPONDYLOLISTHESIS OF LUMBOSACRAL REGION: ICD-10-CM

## 2018-10-31 DIAGNOSIS — Z98.1 STATUS POST LUMBAR SPINAL FUSION: ICD-10-CM

## 2018-10-31 DIAGNOSIS — M47.816 LUMBAR SPONDYLOSIS: Primary | ICD-10-CM

## 2018-10-31 PROCEDURE — 72100 X-RAY EXAM L-S SPINE 2/3 VWS: CPT | Mod: 26,,, | Performed by: RADIOLOGY

## 2018-10-31 PROCEDURE — 99999 PR PBB SHADOW E&M-EST. PATIENT-LVL III: CPT | Mod: PBBFAC,,, | Performed by: NEUROLOGICAL SURGERY

## 2018-10-31 PROCEDURE — 72100 X-RAY EXAM L-S SPINE 2/3 VWS: CPT | Mod: TC

## 2018-10-31 PROCEDURE — 99024 POSTOP FOLLOW-UP VISIT: CPT | Mod: S$GLB,,, | Performed by: NEUROLOGICAL SURGERY

## 2018-10-31 NOTE — PATIENT INSTRUCTIONS
I have personally reviewed the X-Ray of lumbar spine with the pt which shows postoperative changes of MIS TLIF with proper hardware placement and alignment at L3-4, L4-5 and L5-S1.    Pt advised to wear the back brace when she is active and out of the house and to continue to wear the bone growth stimulator.     Pt advised to contact us in regards to working part time after she speaks to her supervisor. She is not yet cleared to return to work full time.    Pt cleared to drive with the back brace but advised against driving while taking narcotic pain medications.     I will schedule the patient for 6 month follow up with CT of lumbar spine.

## 2018-10-31 NOTE — LETTER
October 31, 2018      Chery Lopez MD  [address]  [place] [address]           [facility] - Neurosurgery 7th Fl  [address]  [facility] [address]  Phone: [phone]          Patient: Rosa Fermin   MR Number: [id number]   YOB: 1955   Date of Visit: 10/31/2018       Dear Dr. Chery Lopez:    Thank you for referring Rosa Fermin to me for evaluation. Attached you will find relevant portions of my assessment and plan of care.    If you have questions, please do not hesitate to call me. I look forward to following Rosa Fermin along with you.    Sincerely,    Katy Spicer RN    Enclosure  CC:  No Recipients    If you would like to receive this communication electronically, please contact [email or web address] or [phone] to request more information on [employer] Link access.    For providers and/or their staff who would like to refer a patient to [facility], please contact us through our one-stop-shop provider referral line, [facility], at [phone].    If you feel you have received this communication in error or would no longer like to receive these types of communications, please e-mail [email or web address]

## 2018-10-31 NOTE — PROGRESS NOTES
Subjective:   I, Juan Cheek, attest that this documentation has been prepared under the direction and in the presence of Noel Bernal MD.     Patient ID: Rosa Fermin is a 63 y.o. female     Chief Complaint: Post-op Evaluation      HPI  The patient is a 63 y.o. female with lumbosacral spondylolisthesis who is s/p left-sided MIS TLIF at L3-4 L4-5, and L5-S1 on 09/17/2018 ad presents for her postoperative follow up. The pt has a hx of low back pain and was seen in clinic on 08/22/2018 after a recent exacerbation. She reported her pain intermittently radiated into her RLE, was exacerbated when sitting or laying supine, and was overall self-limiting. She has associated BLE weakness, worse on the left, secondary to back pain and chronic knee pain. Overall back pain was rated an 8-9/10 on average and was moderately controlled with pain medications.    Pt was doing well postoperatively until 10/08/2018 when she was admitted to the hospital for acute saddle pulmonary embolism, prompting a thrombectomy to be performed. She was discharged two days later on Xarelto and states she is tolerating the medications well.     Pt is on Percocet for pain control and states her PCP is managing any refills she may need. She is recovering well and has no complaints at this time. Pt wishes to return to work but is unsure is she will be permitted part-time return per her supervisor; she will speak to her supervisor and contact us. Pt is well-appearing on today's visit and is seated in a wheelchair.    Review of Systems   Constitutional: Negative for activity change, fatigue, fever and unexpected weight change.   HENT: Negative for facial swelling.    Eyes: Negative.    Respiratory: Negative.    Cardiovascular: Negative.    Gastrointestinal: Negative for diarrhea, nausea and vomiting.   Genitourinary: Negative.    Musculoskeletal: Negative for back pain, joint swelling, myalgias and neck pain.   Neurological: Negative for  dizziness, weakness, numbness and headaches.   Psychiatric/Behavioral: Negative.       Past Medical History:   Diagnosis Date    Acute saddle pulmonary embolism with acute cor pulmonale 10/8/2018    Atypical chest pain     Bilateral chronic knee pain     Carpal tunnel syndrome on both sides     Chronic lower back pain     Deep vein thrombosis     History of DVT (deep vein thrombosis)     left leg in the 90s    Hypertension     Osteoarthritis of both knees        Objective:      Vitals:    10/31/18 1040   BP: (!) 124/56   Pulse: 88   Temp: 99.2 °F (37.3 °C)      Physical Exam   Constitutional: She is oriented to person, place, and time. She appears well-developed and well-nourished.   HENT:   Head: Normocephalic and atraumatic.   Neck: Neck supple.   Neurological: She is alert and oriented to person, place, and time. No cranial nerve deficit. She displays a negative Romberg sign. GCS eye subscore is 4. GCS verbal subscore is 5. GCS motor subscore is 6.        IMAGING:  X-Ray Lumbar Spine AP And Lateral (10/31/2018) shows postoperative changes of left-sided MIS TLIF with proper hardware placement and alignment at L3-4, L4-5 and L5-S1.    I have personally reviewed the images with the pt.      I, Dr. Noel Bernal, personally performed the services described in this documentation. All medical record entries made by the scribeJuan, were at my direction and in my presence.  I have reviewed the chart and agree that the record reflects my personal performance and is accurate and complete. Noel Bernal MD. 10/31/2018    Assessment:       S/p Lumbar fusion     Plan:   I have personally reviewed the X-Ray of lumbar spine with the pt which shows postoperative changes of MIS TLIF with proper hardware placement and alignment at L3-4, L4-5 and L5-S1.    Pt advised to wear the back brace when she is active and out of the house and to continue to wear the bone growth stimulator.     Pt advised to contact us in  regards to working part time after she speaks to her supervisor. She is not yet cleared to return to work full time.    Pt cleared to drive with the back brace but advised against driving while taking narcotic pain medications.     I will schedule the patient for 6 month follow up with CT of lumbar spine.

## 2018-11-07 ENCOUNTER — TELEPHONE (OUTPATIENT)
Dept: NEUROSURGERY | Facility: CLINIC | Age: 63
End: 2018-11-07

## 2018-11-07 NOTE — TELEPHONE ENCOUNTER
Pt states that during last visit to [facility], she discussed a return to work letter with Seema Spicer RN. She states that her supervisor is willing to allow her to work 30-32 hours weekly for the next 6 months starting 11/12 (pt is a ). She requests that Seema fax the letter advising these parameters to [phone]. I will discuss with Seema and contact pt afterwards.

## 2018-11-07 NOTE — TELEPHONE ENCOUNTER
----- Message from Subha Garcia sent at 11/7/2018  9:22 AM CST -----  Contact: Pt. [phone]  Needs Advice    Reason for call:The patient would like to speak to someone regarding a return to work letter..contct         Communication Preference:PHONE     Additional Information:

## 2018-11-07 NOTE — TELEPHONE ENCOUNTER
Left sent. Pt aware. Pt states employer needs to send more paperwork for Dr. Bernal to fill out. Gave her clinic fax number.

## 2018-12-26 NOTE — RESIDENT HANDOFF
Handoff     Primary Team: Networked reference to record [facility]  Room Number: 6065/6065 A     Patient Name: Rosa Fermin MRN: [id number]     Date of Birth: [id number] Allergies: Gabapentin and Vistaril [hydroxyzine hcl]     Age: 63 y.o. Admit Date: 10/8/2018     Sex: female  BMI: Body mass index is 38.62 kg/m².     Code Status: Full Code        Illness Level (current clinical status): Watcher - No    Reason for Admission: Acute saddle pulmonary embolism with acute cor pulmonale    Brief HPI (pertinent PMH and diagnosis or differential diagnosis): Patient is a 63 year old female with HTN, chronic low back and knee pain, and history of DVT (in 1997) that presents to hospital with complaint of shortness of breath. She was taken to the emergency department and CT chest showed a bilateral pulmonary embolus.   Procedure Date: 10/08/2018    Hospital Course (updated, brief assessment by system or problem, significant events):   She was started on a heparin drip and underwent cathter directed thrombolysis on 10/08. She was started on alteplase and continued on heparin after the procedure. Today she was switched to Xeralto. Currently stable, no longer on oxygen.         Tasks (specific, using if-then statements): PT/OT, patient has sore back that looks very irritated, please monitor.    Contingency Plan (special circumstances anticipated and plan):     Estimated Discharge Date: TBD    Discharge Disposition: Home or Self Care    Mentored By: Dr. Orta    Please call [phone] if you have any questions. Thank you.   
complain  of dizziness and nausea for over 4 days, that worsen today.

## 2019-02-08 ENCOUNTER — TELEPHONE (OUTPATIENT)
Dept: NEUROSURGERY | Facility: CLINIC | Age: 64
End: 2019-02-08

## 2019-02-08 NOTE — TELEPHONE ENCOUNTER
----- Message from Lilibeth Carl sent at 2/8/2019 11:38 AM CST -----  Contact: patient  Please call above patient at [phone] missed a call from the nurse waiting on a call back thanks

## 2019-02-08 NOTE — TELEPHONE ENCOUNTER
Confirmed 4/24 appt w/ Dr. Bernal. She will call back if she has any questions or concerns in the mean time

## 2019-05-02 PROBLEM — M20.41 HAMMER TOE OF RIGHT FOOT: Status: ACTIVE | Noted: 2019-05-02

## 2019-05-07 ENCOUNTER — TELEPHONE (OUTPATIENT)
Dept: NEUROSURGERY | Facility: CLINIC | Age: 64
End: 2019-05-07

## 2019-05-07 NOTE — TELEPHONE ENCOUNTER
Returned pt's call    She needs more disability paperwork. Explained the process and gave her DD's fax #.    ----- Message from Jose Spicer sent at 5/7/2019  8:55 AM CDT -----  Contact: Patient @ [phone]  Patient requesting a return call regarding the disability paperwork, pls call

## 2019-05-15 ENCOUNTER — OFFICE VISIT (OUTPATIENT)
Dept: NEUROSURGERY | Facility: CLINIC | Age: 64
End: 2019-05-15
Payer: COMMERCIAL

## 2019-05-15 ENCOUNTER — TELEPHONE (OUTPATIENT)
Dept: ENDOCRINOLOGY | Facility: CLINIC | Age: 64
End: 2019-05-15

## 2019-05-15 VITALS
WEIGHT: 293 LBS | HEART RATE: 82 BPM | SYSTOLIC BLOOD PRESSURE: 135 MMHG | TEMPERATURE: 98 F | BODY MASS INDEX: 51.91 KG/M2 | HEIGHT: 63 IN | DIASTOLIC BLOOD PRESSURE: 67 MMHG

## 2019-05-15 DIAGNOSIS — M47.816 LUMBAR SPONDYLOSIS: Primary | ICD-10-CM

## 2019-05-15 DIAGNOSIS — M47.816 LUMBAR SPONDYLOSIS: ICD-10-CM

## 2019-05-15 DIAGNOSIS — M43.16 SPONDYLOLISTHESIS, LUMBAR REGION: Primary | ICD-10-CM

## 2019-05-15 DIAGNOSIS — M43.16 SPONDYLOLISTHESIS, LUMBAR REGION: ICD-10-CM

## 2019-05-15 DIAGNOSIS — M54.50 MIDLINE LOW BACK PAIN WITHOUT SCIATICA, UNSPECIFIED CHRONICITY: ICD-10-CM

## 2019-05-15 DIAGNOSIS — Z98.1 STATUS POST LUMBAR SPINAL FUSION: ICD-10-CM

## 2019-05-15 PROCEDURE — 3008F BODY MASS INDEX DOCD: CPT | Mod: CPTII,S$GLB,, | Performed by: NEUROLOGICAL SURGERY

## 2019-05-15 PROCEDURE — 99999 PR PBB SHADOW E&M-EST. PATIENT-LVL III: ICD-10-PCS | Mod: PBBFAC,,, | Performed by: NEUROLOGICAL SURGERY

## 2019-05-15 PROCEDURE — 3075F PR MOST RECENT SYSTOLIC BLOOD PRESS GE 130-139MM HG: ICD-10-PCS | Mod: CPTII,S$GLB,, | Performed by: NEUROLOGICAL SURGERY

## 2019-05-15 PROCEDURE — 99999 PR PBB SHADOW E&M-EST. PATIENT-LVL III: CPT | Mod: PBBFAC,,, | Performed by: NEUROLOGICAL SURGERY

## 2019-05-15 PROCEDURE — 99214 OFFICE O/P EST MOD 30 MIN: CPT | Mod: S$GLB,,, | Performed by: NEUROLOGICAL SURGERY

## 2019-05-15 PROCEDURE — 3078F DIAST BP <80 MM HG: CPT | Mod: CPTII,S$GLB,, | Performed by: NEUROLOGICAL SURGERY

## 2019-05-15 PROCEDURE — 99214 PR OFFICE/OUTPT VISIT, EST, LEVL IV, 30-39 MIN: ICD-10-PCS | Mod: S$GLB,,, | Performed by: NEUROLOGICAL SURGERY

## 2019-05-15 PROCEDURE — 3078F PR MOST RECENT DIASTOLIC BLOOD PRESSURE < 80 MM HG: ICD-10-PCS | Mod: CPTII,S$GLB,, | Performed by: NEUROLOGICAL SURGERY

## 2019-05-15 PROCEDURE — 3008F PR BODY MASS INDEX (BMI) DOCUMENTED: ICD-10-PCS | Mod: CPTII,S$GLB,, | Performed by: NEUROLOGICAL SURGERY

## 2019-05-15 PROCEDURE — 3075F SYST BP GE 130 - 139MM HG: CPT | Mod: CPTII,S$GLB,, | Performed by: NEUROLOGICAL SURGERY

## 2019-05-15 NOTE — PATIENT INSTRUCTIONS
I have personally reviewed the CT lumbar spine with the pt which shows postoperative changes of MIS TLIF with proper hardware placement and alignment at L3-4, L4-5 and L5-S1 with solid bone growth through L3-4 and L5-S1.    Pt is cleared to remove the back brace and she no longer needs to use the bone growth stimulator.     Pt released from my care at this time and advised to contact us with any questions, concerns, or if she experiences any new or worsening symptoms.

## 2019-05-15 NOTE — PROGRESS NOTES
Subjective:   I, Juan Cheek, attest that this documentation has been prepared under the direction and in the presence of Noel Bernal MD.     Patient ID: Rosa Fermin is a 64 y.o. female     Chief Complaint: No chief complaint on file.      HPI  Ms. Rosa Fermin is a pleasant 64 y.o. woman who is s/p left-sided MIS TLIF at L3-4, L4-5, and L5-S1 on 09/17/2018 and presents for her for her 6 month follow up with CT L-spine. The pt has a hx of low back pain and was seen in clinic on 08/22/2018 after a recent exacerbation. She reported her pain intermittently radiated into her RLE, was exacerbated when sitting or laying supine, and was overall self-limiting. She had associated BLE weakness, worse on the left, secondary to back pain and chronic knee pain. Overall, back pain was rated an 8-9/10 on average and was moderately controlled with pain medications. She had a workup that included a MRI and CT of the lumbar spine that showed a lumbar spondylosis and spondylolisthesis associated with her leg pain, in addition to a broad-based herniated disk at L4-L5 with bilateral foraminal encroachment. Given these findings we proceeded with surgical intervention as indicated above. Pt was doing well postoperatively until 10/08/2018 when she was admitted to the hospital for acute saddle pulmonary embolism, prompting a thrombectomy to be performed. She was discharged two days later on Xarelto, which she tolerated well. At the time of the last office visit, on 10/31/2018, she was doing well and had no acute complaints.     Pt states she has continued to do well. She denies any gait dysfunction and has no complaint at this time.      Review of Systems   Constitutional: Negative for activity change, fatigue, fever and unexpected weight change.   HENT: Negative for facial swelling.    Eyes: Negative.    Respiratory: Negative.    Cardiovascular: Negative.    Gastrointestinal: Negative for diarrhea, nausea and vomiting.    Genitourinary: Negative.    Musculoskeletal: Negative for back pain, joint swelling, myalgias and neck pain.   Neurological: Negative for dizziness, weakness, numbness and headaches.   Psychiatric/Behavioral: Negative.       Past Medical History:   Diagnosis Date    Acute saddle pulmonary embolism with acute cor pulmonale 10/8/2018    Atypical chest pain     Bilateral chronic knee pain     Carpal tunnel syndrome on both sides     Chronic lower back pain     Deep vein thrombosis     History of DVT (deep vein thrombosis)     left leg in the 90s    Hypertension     Osteoarthritis of both knees        Objective:      Vitals:    05/15/19 1001   BP: 135/67   Pulse: 82   Temp: 98.3 °F (36.8 °C)      Physical Exam   Constitutional: She is oriented to person, place, and time. She appears well-developed and well-nourished.   HENT:   Head: Normocephalic and atraumatic.   Neck: Neck supple.   Neurological: She is alert and oriented to person, place, and time. No cranial nerve deficit. She displays a negative Romberg sign. GCS eye subscore is 4. GCS verbal subscore is 5. GCS motor subscore is 6.          IMAGING:  CT Lumbar Spine Without Contrast (05/01/2019) shows postoperative changes of MIS TLIF with proper hardware placement and alignment at L3-4, L4-5 and L5-S1 with solid bone growth through L3-4 and L5-S1    I have personally reviewed the images with the pt.      I, Dr. Noel Bernal, personally performed the services described in this documentation. All medical record entries made by the scribe, Juan Cheek, were at my direction and in my presence.  I have reviewed the chart and agree that the record reflects my personal performance and is accurate and complete. Noel Bernal MD. 05/15/2019    Assessment:       Lumbar spondylosis.  S/p lumbar fusion.      Plan:   I have personally reviewed the CT lumbar spine with the pt which shows postoperative changes of MIS TLIF with proper hardware placement and alignment  at L3-4, L4-5 and L5-S1 with solid bone growth through L3-4 and L5-S1.    Pt is cleared to remove the back brace and she no longer needs to use the bone growth stimulator.     Pt released from my care at this time and advised to contact us with any questions, concerns, or if she experiences any new or worsening symptoms.

## 2019-05-16 ENCOUNTER — TELEPHONE (OUTPATIENT)
Dept: NEUROSURGERY | Facility: CLINIC | Age: 64
End: 2019-05-16

## 2019-10-16 PROBLEM — R31.21 ASYMPTOMATIC MICROSCOPIC HEMATURIA: Status: ACTIVE | Noted: 2019-10-16

## 2020-02-24 PROBLEM — R06.00 DYSPNEA: Status: ACTIVE | Noted: 2020-02-24

## 2020-02-24 PROBLEM — I26.99 ACUTE PULMONARY EMBOLISM: Status: ACTIVE | Noted: 2020-02-24

## 2020-02-24 PROBLEM — R06.02 SOB (SHORTNESS OF BREATH): Status: ACTIVE | Noted: 2020-02-24

## 2020-02-25 PROBLEM — Z86.711 HISTORY OF PULMONARY EMBOLISM: Status: ACTIVE | Noted: 2020-02-24

## 2020-02-26 PROBLEM — R78.81 BACTEREMIA DUE TO GRAM-NEGATIVE BACTERIA: Status: ACTIVE | Noted: 2020-02-26

## 2020-02-27 PROBLEM — R06.02 SOB (SHORTNESS OF BREATH): Status: RESOLVED | Noted: 2020-02-24 | Resolved: 2020-02-27

## 2020-03-09 PROBLEM — L98.8 FISTULA: Status: ACTIVE | Noted: 2020-03-09

## 2020-03-10 PROBLEM — Z86.19 HISTORY OF HELICOBACTER PYLORI INFECTION: Status: ACTIVE | Noted: 2017-11-13

## 2020-03-10 PROBLEM — Z87.898 HISTORY OF BACTEREMIA: Status: ACTIVE | Noted: 2020-02-26

## 2020-04-21 DIAGNOSIS — Z01.84 ANTIBODY RESPONSE EXAMINATION: ICD-10-CM

## 2020-05-21 DIAGNOSIS — Z01.84 ANTIBODY RESPONSE EXAMINATION: ICD-10-CM

## 2020-06-12 ENCOUNTER — OFFICE VISIT (OUTPATIENT)
Dept: SURGERY | Facility: CLINIC | Age: 65
End: 2020-06-12
Payer: COMMERCIAL

## 2020-06-12 DIAGNOSIS — N32.1 COLOVESICAL FISTULA: ICD-10-CM

## 2020-06-12 DIAGNOSIS — K57.92 DIVERTICULITIS: Primary | ICD-10-CM

## 2020-06-12 PROCEDURE — 99204 OFFICE O/P NEW MOD 45 MIN: CPT | Mod: 95,,, | Performed by: COLON & RECTAL SURGERY

## 2020-06-12 PROCEDURE — 99204 PR OFFICE/OUTPT VISIT, NEW, LEVL IV, 45-59 MIN: ICD-10-PCS | Mod: 95,,, | Performed by: COLON & RECTAL SURGERY

## 2020-06-12 NOTE — PROGRESS NOTES
[facility] Telemedicine Virtual Visit History and Physical    Referring Md:   No referring provider defined for this encounter.    SUBJECTIVE:     The patient location is:  work  The chief complaint leading to consultation is:  Colovesicular fistula    Visit type: audiovisual    Face to Face time with patient:  15 min  30 minutes of total time spent on the encounter, which includes face to face time and non-face to face time preparing to see the patient (eg, review of tests), Obtaining and/or reviewing separately obtained history, Documenting clinical information in the electronic or other health record, Independently interpreting results (not separately reported) and communicating results to the patient/family/caregiver, or Care coordination (not separately reported).       Each patient to whom he or she provides medical services by telemedicine is:  (1) informed of the relationship between the physician and patient and the respective role of any other health care provider with respect to management of the patient; and (2) notified that he or she may decline to receive medical services by telemedicine and may withdraw from such care at any time.      History of Present Illness:  The patient is new patient to this practice.   Course is as follows:  Patient is a 65 y.o. female presents with colovesicular fistula.  She had diverticulitis multiple years ago.  Approximately year and half ago, she noted intermittent passage of air in her urine.  No passage of stool.  Intermittent urinary tract infection.  She has been treated with chronic Levaquin.  She underwent a CT scan on 03/06/2020 that demonstrated sigmoid diverticulosis and a likely sigmoid colovesicular fistula.  She had been evaluated at [facility] and was interested in endoscopic approaches at colonic fistula repair.  Functionally, she walks with a cane.  She has arthritis which limits her activity as well as intermittent dyspnea on exertion.  She is  therapeutically anticoagulated secondary to prior pulmonary emboli following spine surgery.  She has bowel movements every other day.  No issues with fecal incontinence.  No family history of colon or rectal cancer or inflammatory bowel disease.    Last Colonoscopy:  03/09/2020:  Diffuse diverticulosis.  No obvious fistula seen    Review of patient's allergies indicates:   Allergen Reactions    Gabapentin Swelling    Vistaril [hydroxyzine hcl] Rash       Past Medical History:   Diagnosis Date    Acute saddle pulmonary embolism with acute cor pulmonale 10/8/2018    Atypical chest pain     Bilateral chronic knee pain     Carpal tunnel syndrome on both sides     Chronic lower back pain     Deep vein thrombosis     Fistula     History of DVT (deep vein thrombosis)     left leg in the 90s    Hypertension     Osteoarthritis of both knees      Past Surgical History:   Procedure Laterality Date    COLONOSCOPY  2007    Dr Bolton    COLONOSCOPY N/A 12/11/2017    Procedure: COLONOSCOPY;  Surgeon: Shanelle Braga MD;  Location: [facility];  Service: Endoscopy;  Laterality: N/A;    COLONOSCOPY N/A 3/9/2020    Procedure: COLONOSCOPY;  Surgeon: Jose Cruz Wilson MD;  Location: [facility];  Service: General;  Laterality: N/A;    ENDOMETRIAL ABLATION N/A 2003    MINIMALLY INVASIVE TRANSFORAMINAL LUMBAR INTERBODY FUSION (TLIF) N/A 9/17/2018    Procedure: FUSION, SPINE, LUMBAR, TLIF, MINIMALLY INVASIVE @ L4-L5 & L5-S1;  Surgeon: Noel Bernal MD;  Location: [address];  Service: Neurosurgery;  Laterality: N/A;    SHOULDER ARTHROSCOPY W/ ROTATOR CUFF REPAIR Bilateral 2012, 2013    TOTAL KNEE ARTHROPLASTY Left      Family History   Problem Relation Age of Onset    Diabetes Mother         IDDM    DIEGO disease Mother     Hypertension Mother     Kidney disease Mother     Carpal tunnel syndrome Sister     Arthritis Sister     Diabetes Brother     Kidney disease Brother         kidney transplant     Arthritis Brother     Heart murmur Daughter     No Known Problems Son     Arthritis Sister     Diabetes Brother     Arthritis Brother     Diabetes Brother     Diabetes Brother     No Known Problems Maternal Grandmother     No Known Problems Maternal Grandfather     No Known Problems Paternal Grandmother     No Known Problems Paternal Grandfather      Social History     Tobacco Use    Smoking status: Never Smoker    Smokeless tobacco: Never Used   Substance Use Topics    Alcohol use: No     Alcohol/week: 0.0 standard drinks    Drug use: No        Review of Systems:  Review of Systems   Constitutional: Negative for chills, diaphoresis, fever, malaise/fatigue and weight loss.   HENT: Negative for congestion.    Respiratory: Positive for shortness of breath.    Cardiovascular: Positive for leg swelling. Negative for chest pain.   Gastrointestinal: Negative for abdominal pain, blood in stool, constipation, nausea and vomiting.   Genitourinary: Positive for dysuria.   Musculoskeletal: Positive for joint pain. Negative for back pain and myalgias.   Skin: Negative for rash.   Neurological: Negative for dizziness and weakness.   Endo/Heme/Allergies: Does not bruise/bleed easily.   Psychiatric/Behavioral: Negative for depression.       OBJECTIVE:     Vital Signs (Most Recent)  There were no vitals taken for this visit.  (unable to be attained, telemedicine visit)    Physical Exam:  General: Black or  female in no distress   Neuro: alert and oriented x 4.  Moves all extremities.     HEENT: no icterus.  Trachea midline  Respiratory: respirations are even and unlabored  Cardiac:  Not evaluated  Abdomen:  Not evaluated  Extremities: Warm dry and intact  Skin: no rashes  Anorectal:  Not evaluated    Labs:  H&H 12 and 37.  Normal renal function.  Albumin 3.8.    Imaging: CT 3/6/20 reviewed and demonstrates findings consistent with colovesicular fistula from the sigmoid colon diverticulosis.  Umbilical  hernia seen.      ASSESSMENT/PLAN:     Diagnoses and all orders for this visit:    Diverticulitis    Colovesical fistula        65-year-old woman with colovesicular fistula secondary to diverticulitis.  Long discussion today regarding treatment options.  She previously been interested endoscopic approaches.  We discussed that there is a theoretical treatment with fistula closure using an OVESCO clip.  We have not had any significant success or long-term durable repair with this method.  Secondly, identifying the exact site of her fistula in the setting of multiple sigmoid colon diverticulum will be challenging.  We discussed that endoscopic repair is not a durable option and is not recommended.  Sigmoid colectomy was recommended.  This would allow definitive repair and removal of the diseased portion of the sigmoid with over-sewing of the bladder fistula.  We discussed this could be performed laparoscopically with extraction through a Pfannenstiel incision.  We discussed the expected hospital stay of 3-5 days as well as a 6 week total recovery.  Risks of the surgery including anastomotic leak, bleeding, hernia, abscess formation, wound infection were discussed.  We also discussed this would change her bowel frequency.  Laparoscopic sigmoid resection can be performed at [facility].  I am happy to assist in this.  I will reach out to the resident team for coordination.      DEVAN Hoyt MD, FACS  Staff Surgeon  Colon & Rectal Surgery

## 2020-06-20 DIAGNOSIS — Z01.84 ANTIBODY RESPONSE EXAMINATION: ICD-10-CM

## 2020-07-20 DIAGNOSIS — Z01.84 ANTIBODY RESPONSE EXAMINATION: ICD-10-CM

## 2020-07-27 DIAGNOSIS — U07.1 COVID-19 VIRUS DETECTED: ICD-10-CM

## 2020-08-10 PROBLEM — Z86.16 HISTORY OF 2019 NOVEL CORONAVIRUS DISEASE (COVID-19): Status: ACTIVE | Noted: 2020-08-10

## 2020-08-10 PROBLEM — I27.82 CHRONIC SADDLE PULMONARY EMBOLISM WITH ACUTE COR PULMONALE: Status: ACTIVE | Noted: 2018-10-08

## 2020-08-10 PROBLEM — I26.92 ACUTE SADDLE PULMONARY EMBOLISM: Status: RESOLVED | Noted: 2018-10-08 | Resolved: 2020-08-10

## 2020-08-12 PROBLEM — N32.1 COLOVESICAL FISTULA: Status: ACTIVE | Noted: 2020-08-12

## 2020-08-19 DIAGNOSIS — Z01.84 ANTIBODY RESPONSE EXAMINATION: ICD-10-CM

## 2020-08-19 NOTE — LETTER
August 23, 2018      Avinash Tapia MD  [address] [place] [id number]           [facility] - Neurosurgery 7th Fl  [address]  [facility] [address]  Phone: [phone]          Patient: Rosa Fermin   MR Number: [id number]   YOB: 1955   Date of Visit: 8/22/2018       Dear Dr. Avniash Tapia:    Thank you for referring Rosa Fermin to me for evaluation. Attached you will find relevant portions of my assessment and plan of care.    If you have questions, please do not hesitate to call me. I look forward to following Rosa Fermin along with you.    Sincerely,    Noel Bernal MD    Enclosure  CC:  No Recipients    If you would like to receive this communication electronically, please contact [email or web address] or [phone] to request more information on [employer] access.    For providers and/or their staff who would like to refer a patient to [facility], please contact us through our one-stop-shop provider referral line, [facility], at [phone].    If you feel you have received this communication in error or would no longer like to receive these types of communications, please e-mail [email or web address]          Reason for call:  Other   Patient called regarding (reason for call): call back  Additional comments: Patient is calling to inform the doctor that he has lost weight and is now at 190, please call back to discuss.    Phone number to reach patient:  Home number on file [phone] (home)    Best Time:  any    Can we leave a detailed message on this number?  YES    Travel screening: Negative

## 2020-08-21 ENCOUNTER — PATIENT OUTREACH (OUTPATIENT)
Dept: ADMINISTRATIVE | Facility: CLINIC | Age: 65
End: 2020-08-21

## 2020-08-21 NOTE — PATIENT INSTRUCTIONS
Recovering from Colorectal Surgery    When the surgery is done, youmateusz be taken to the recovery room (also called the post-anesthesia care unit or PACU). Here, you will be carefully monitored for vital signs including breathing, temperature, blood pressure, and heart rate. Youll also receive pain medicine to keep you comfortable. When youre ready, youmateusz be moved to a regular hospital room. Your hospital stay may last 5 to 10 days or longer.  Right after surgery  If you have a urinary catheter, it will probably be removed shortly after surgery. Your intravenous (IV) line will remain in place for a few days to give you fluids. And youll continue to receive medicine for pain. Soon after surgery, youll be up and walking around. This helps improve blood flow and prevent blood clots. It also helps your bowels return to normal. Youll be given breathing exercises to keep your lungs clear.  Eating again  You wont eat or drink much until your colon begins working again. You'll begin with a liquid diet, then move on to solid foods.  Recovering at home  In most cases, youll visit your healthcare provider within a few weeks after leaving the hospital. You can get back to your normal routine about a month or 2 after surgery. Full recovery may take 6 weeks or longer. While your body heals, you may tire more easily. You also are likely to have some bloating. Loose stools and more frequent bowel movements are common after bowel surgery. This may get better over time, but may never disappear completely.  Resuming everyday activities  Being active helps your body heal. But you must protect your healing incisions:  · Walk as much as you feel up to.  · Avoid heavy lifting or vigorous exercise until your healthcare provider says its OK. Follow your healthcare providers advice about climbing stairs and bathing.  · You can drive when youre no longer taking pain medicines--in about 7 to 10 days.  · Follow your healthcare  provider's advice about resuming sexual activity.         Call your healthcare provider  Call your healthcare provider if you have:  · Fever of 100.4°F (38°C) or higher, or as directed by your healthcare provider   · Persistent nausea or vomiting  · Unusual redness, swelling, drainage, or pain around your incision  · Severe constipation or diarrhea  · Worsening pain  · Leg swelling or trouble breathing  · Bleeding from the rectum  · Difficulty or inability to urinate   Date Last Reviewed: 8/1/2020 © 2000-2020 [employer]. [address] [place] [address], [place] [address]. All rights reserved. This information is not intended as a substitute for professional medical care. Always follow your healthcare professional's instructions.

## 2020-08-26 PROBLEM — N32.1 COLOVESICAL FISTULA: Status: RESOLVED | Noted: 2020-08-12 | Resolved: 2020-08-26

## 2020-08-26 PROBLEM — L98.8 FISTULA: Status: RESOLVED | Noted: 2020-03-09 | Resolved: 2020-08-26

## 2020-08-31 ENCOUNTER — EXTERNAL HOME HEALTH (OUTPATIENT)
Dept: HOME HEALTH SERVICES | Facility: HOSPITAL | Age: 65
End: 2020-08-31

## 2020-09-18 DIAGNOSIS — Z01.84 ANTIBODY RESPONSE EXAMINATION: ICD-10-CM

## 2020-10-18 DIAGNOSIS — Z01.84 ANTIBODY RESPONSE EXAMINATION: ICD-10-CM

## 2020-11-17 DIAGNOSIS — Z01.84 ANTIBODY RESPONSE EXAMINATION: ICD-10-CM

## 2022-09-16 PROBLEM — E87.20 METABOLIC ACIDOSIS: Status: ACTIVE | Noted: 2022-09-16

## 2022-12-13 NOTE — PLAN OF CARE
Patient instructed to self quarantine  Advised to avoid nsaids  If you develop prolonged high fever, worsening or productive cough, shortness of breath, difficulty breathing, chest pain, or any new or concerning symptoms please proceed ER  Instructed patient to call ER prior to arrival make them aware she is being test for covid  Patient verbalizes understanding  Start vitamin c 1g twice daily,vitamin d3 2000IU daily, and multivitamin  monitor pulse ox  Call PCP in 24 hours for f/u  Problem: Occupational Therapy Goal  Goal: Occupational Therapy Goal  Goals to be met by: 10/25    Patient will increase functional independence with ADLs by performing:    UE Dressing with [place].  LE Dressing with Set-up Assistance.  Grooming while standing with [place].  Toileting from toilet with Stand-by Assistance for hygiene and clothing management.   Bathing from  edge of bed with Set-up Assistance.    Outcome: Ongoing (interventions implemented as appropriate)  Evaluation complete and goals set.  Cont with POC  Alka Pathak OT  10/10/2018

## 2024-06-13 NOTE — LETTER
August 3, 2018      Chery Lopez MD  [address] [facility] [address]           [facility]  [address] [facility] [address]  Phone: [phone]  Fax: [phone]          Patient: Rosa Fermin   MR Number: [id number]   YOB: 1955   Date of Visit: 8/3/2018       Dear Dr. Chery Lopez:    Thank you for referring Rosa Fermin to me for evaluation. Attached you will find relevant portions of my assessment and plan of care.    If you have questions, please do not hesitate to call me. I look forward to following Rosa Fermin along with you.    Sincerely,    Avinash Tapia MD    Enclosure  CC:  No Recipients    If you would like to receive this communication electronically, please contact [email or web address] or [phone] to request more information on [employer] access.    For providers and/or their staff who would like to refer a patient to [facility], please contact us through our one-stop-shop provider referral line, [facility], at [phone].    If you feel you have received this communication in error or would no longer like to receive these types of communications, please e-mail [email or web address]          Form submitted to the PA team.

## (undated) DEVICE — DRESSING TELFA STRL 4X3 LF

## (undated) DEVICE — DRESSING SURGICAL 1/2X1/2

## (undated) DEVICE — PACK SET UP CONVERTORS

## (undated) DEVICE — DRAPE OPMI STERILE

## (undated) DEVICE — DRAPE STERI INSTRUMENT 1018

## (undated) DEVICE — DRESSING AQUACEL SACRAL 9 X 9

## (undated) DEVICE — MARKER SKIN STND TIP BLUE BARR

## (undated) DEVICE — DRAPE ABDOMINAL TIBURON 14X11

## (undated) DEVICE — DRAPE C ARM 42 X 120 10/BX

## (undated) DEVICE — SUT D SPECIAL VICRYL 2-0

## (undated) DEVICE — ROD SPINAL LORDOTIC 5.5X80MM
Type: IMPLANTABLE DEVICE | Site: BACK | Status: NON-FUNCTIONAL
Removed: 2018-09-17

## (undated) DEVICE — CARTRIDGE OIL

## (undated) DEVICE — SPONGE LAP 18X18 PREWASHED

## (undated) DEVICE — SUT MCRYL PLUS 4-0 PS2 27IN

## (undated) DEVICE — DRESSING AQUACEL FOAM 5 X 5

## (undated) DEVICE — DRESSING TRANS 4X4 TEGADERM

## (undated) DEVICE — TUBE FRAZIER 5MM 2FT SOFT TIP

## (undated) DEVICE — DURAPREP SURG SCRUB 26ML

## (undated) DEVICE — GAUZE SPONGE 4X4 12PLY

## (undated) DEVICE — BLADE ELECTRO EDGE INSULATED

## (undated) DEVICE — DRAPE INCISE IOBAN 2 23X17IN

## (undated) DEVICE — DRESSING TRANS 8X12 TEGADERM

## (undated) DEVICE — ELECTRODE REM PLYHSV RETURN 9

## (undated) DEVICE — TRAY FOLEY 16FR INFECTION CONT

## (undated) DEVICE — DIFFUSER

## (undated) DEVICE — Device

## (undated) DEVICE — KIT SURGIFLO HEMOSTATIC MATRIX

## (undated) DEVICE — SEE MEDLINE ITEM 157150

## (undated) DEVICE — BUR BONE CUT MICRO TPS 3X3.8MM

## (undated) DEVICE — DRESSING AQUACEL AG FOAM 4X4

## (undated) DEVICE — DRESSING AQUACEL FOAM 3 X 3

## (undated) DEVICE — KIT GRAFTMAG GRAFT DELIVERY

## (undated) DEVICE — SEE MEDLINE ITEM 156905

## (undated) DEVICE — CORD BIPOLAR 12 FOOT

## (undated) DEVICE — DRESSING SURGICAL 3/4X3/4

## (undated) DEVICE — KIT ACCESS DILATOR SET PROBE

## (undated) DEVICE — MONOPOLAR STIMULATING PROBE

## (undated) DEVICE — ROD SPINAL 5.5X70 MULTI ANGLE
Type: IMPLANTABLE DEVICE | Site: BACK | Status: NON-FUNCTIONAL
Removed: 2018-09-17